# Patient Record
Sex: MALE | Race: BLACK OR AFRICAN AMERICAN | Employment: FULL TIME | URBAN - METROPOLITAN AREA
[De-identification: names, ages, dates, MRNs, and addresses within clinical notes are randomized per-mention and may not be internally consistent; named-entity substitution may affect disease eponyms.]

---

## 2018-08-13 ENCOUNTER — DOCUMENTATION ONLY (OUTPATIENT)
Dept: NEUROLOGY | Age: 48
End: 2018-08-13

## 2018-08-14 ENCOUNTER — OFFICE VISIT (OUTPATIENT)
Dept: NEUROLOGY | Age: 48
End: 2018-08-14

## 2018-08-14 VITALS
BODY MASS INDEX: 44.67 KG/M2 | RESPIRATION RATE: 18 BRPM | SYSTOLIC BLOOD PRESSURE: 144 MMHG | WEIGHT: 312 LBS | OXYGEN SATURATION: 98 % | HEIGHT: 70 IN | TEMPERATURE: 98.4 F | DIASTOLIC BLOOD PRESSURE: 80 MMHG | HEART RATE: 86 BPM

## 2018-08-14 DIAGNOSIS — G47.33 OSA (OBSTRUCTIVE SLEEP APNEA): Primary | ICD-10-CM

## 2018-08-14 PROBLEM — E66.01 OBESITY, MORBID (HCC): Status: ACTIVE | Noted: 2018-08-14

## 2018-08-14 RX ORDER — CARVEDILOL 25 MG/1
25 TABLET ORAL 2 TIMES DAILY WITH MEALS
COMMUNITY
End: 2019-09-30 | Stop reason: ALTCHOICE

## 2018-08-14 RX ORDER — ATORVASTATIN CALCIUM 40 MG/1
40 TABLET, FILM COATED ORAL DAILY
COMMUNITY
End: 2019-03-11 | Stop reason: SDUPTHER

## 2018-08-14 RX ORDER — BISMUTH SUBSALICYLATE 262 MG
1 TABLET,CHEWABLE ORAL DAILY
COMMUNITY

## 2018-08-14 RX ORDER — METFORMIN HYDROCHLORIDE 1000 MG/1
1000 TABLET ORAL 2 TIMES DAILY WITH MEALS
COMMUNITY
End: 2019-02-12 | Stop reason: SDUPTHER

## 2018-08-14 RX ORDER — LISINOPRIL AND HYDROCHLOROTHIAZIDE 20; 25 MG/1; MG/1
TABLET ORAL DAILY
COMMUNITY
End: 2019-01-28 | Stop reason: SDUPTHER

## 2018-08-14 NOTE — PROGRESS NOTES
Chief Complaint   Patient presents with    Sleep Problem     going on about 2 months     Patient in rm. 4

## 2018-08-14 NOTE — PROGRESS NOTES
Pt is followed at [facility] for h/o GRAHAM and tells me he is here by mistake. Therefore no services were provided and patient was not charged.

## 2018-08-14 NOTE — PATIENT INSTRUCTIONS
Thank you for choosing [facility] and [facility] for your     care. You may receive a survey about your visit. We appreciate you taking time     to complete this survey as we use your feedback to improve our services. We     realize we are not perfect, but we strive to provide excellent care.

## 2019-01-28 ENCOUNTER — HOSPITAL ENCOUNTER (OUTPATIENT)
Dept: LAB | Age: 49
Discharge: HOME | End: 2019-01-28
Payer: COMMERCIAL

## 2019-01-28 ENCOUNTER — OFFICE VISIT (OUTPATIENT)
Dept: FAMILY MEDICINE CLINIC | Age: 49
End: 2019-01-28

## 2019-01-28 VITALS
WEIGHT: 315 LBS | BODY MASS INDEX: 45.1 KG/M2 | HEART RATE: 88 BPM | DIASTOLIC BLOOD PRESSURE: 67 MMHG | SYSTOLIC BLOOD PRESSURE: 119 MMHG | RESPIRATION RATE: 16 BRPM | OXYGEN SATURATION: 98 % | HEIGHT: 70 IN | TEMPERATURE: 97.6 F

## 2019-01-28 DIAGNOSIS — F41.9 ANXIETY AND DEPRESSION: ICD-10-CM

## 2019-01-28 DIAGNOSIS — G62.9 POLYNEUROPATHY: ICD-10-CM

## 2019-01-28 DIAGNOSIS — E11.65 CONTROLLED TYPE 2 DIABETES MELLITUS WITH HYPERGLYCEMIA, WITH LONG-TERM CURRENT USE OF INSULIN (HCC): Primary | ICD-10-CM

## 2019-01-28 DIAGNOSIS — Z79.4 CONTROLLED TYPE 2 DIABETES MELLITUS WITH HYPERGLYCEMIA, WITH LONG-TERM CURRENT USE OF INSULIN (HCC): Primary | ICD-10-CM

## 2019-01-28 DIAGNOSIS — E11.65 CONTROLLED TYPE 2 DIABETES MELLITUS WITH HYPERGLYCEMIA, WITH LONG-TERM CURRENT USE OF INSULIN (HCC): ICD-10-CM

## 2019-01-28 DIAGNOSIS — Z79.4 CONTROLLED TYPE 2 DIABETES MELLITUS WITH HYPERGLYCEMIA, WITH LONG-TERM CURRENT USE OF INSULIN (HCC): ICD-10-CM

## 2019-01-28 DIAGNOSIS — I10 ESSENTIAL HYPERTENSION: ICD-10-CM

## 2019-01-28 DIAGNOSIS — F32.A ANXIETY AND DEPRESSION: ICD-10-CM

## 2019-01-28 DIAGNOSIS — B35.3 TINEA PEDIS OF BOTH FEET: ICD-10-CM

## 2019-01-28 DIAGNOSIS — E78.00 PURE HYPERCHOLESTEROLEMIA: ICD-10-CM

## 2019-01-28 LAB — HBA1C MFR BLD HPLC: 11.9 %

## 2019-01-28 PROCEDURE — 82043 UR ALBUMIN QUANTITATIVE: CPT

## 2019-01-28 RX ORDER — LISINOPRIL AND HYDROCHLOROTHIAZIDE 20; 25 MG/1; MG/1
1 TABLET ORAL DAILY
Qty: 90 TAB | Refills: 0 | Status: SHIPPED | OUTPATIENT
Start: 2019-01-28 | End: 2019-05-31 | Stop reason: SDUPTHER

## 2019-01-28 NOTE — PROGRESS NOTES
Pt is here to establish care. HTN, Diabetes, Cholesterol    1. Have you been to the ER, urgent care clinic since your last visit? Hospitalized since your last visit? Yes [facility] ED 11/15/18 Strep throat    2. Have you seen or consulted any other health care providers outside of the [address] [place] [address] since your last visit? Include any pap smears or colon screening.  No

## 2019-01-28 NOTE — PROGRESS NOTES
Subjective:    Ever Callaway is a 51y.o. year old male seen for follow up of diabetes. He also has hypertension and hyperlipidemia. Diabetic Review of Systems - medication compliance: compliant all of the time, diabetic diet compliance: noncompliant some of the time, home glucose monitoring: is performed sporadically, fasting values range 200, further diabetic ROS: no polyuria or polydipsia, no chest pain, dyspnea or TIA's, no numbness, tingling or pain in extremities, last eye exam approximately 1year ago, acute symptoms are none. Other symptoms and concerns: patient reports he has symptoms of anxiety/depression for years. Has not been treated in the past.  Patient would like to explore counseling, does not want medication therapy at this time. PHQ-9 depression questionnaire score 14 reflecting moderate depression; previously n/a  SEGUNDO-7 anxiety questionnaire score 13 reflecting moderate anxiety; previously n/a        Patient Active Problem List   Diagnosis Code    Obesity, morbid ([facility]) E66.01     Current Outpatient Medications   Medication Sig Dispense Refill    insulin glargine,hum.rec.anlog (LANTUS U-100 INSULIN SC) 100 Units by SubCUTAneous route every evening.  metFORMIN (GLUCOPHAGE) 1,000 mg tablet Take 1,000 mg by mouth two (2) times daily (with meals).  atorvastatin (LIPITOR) 40 mg tablet Take 40 mg by mouth daily.  carvedilol (COREG) 25 mg tablet Take 25 mg by mouth two (2) times daily (with meals).  lisinopril-hydroCHLOROthiazide (PRINZIDE, ZESTORETIC) 20-25 mg per tablet Take  by mouth daily.  multivitamin (ONE A DAY) tablet Take 1 Tab by mouth daily.         Allergies   Allergen Reactions    Pcn [Penicillins] Other (comments)     Unknown was told when a child     Past Surgical History:   Procedure Laterality Date    HX APPENDECTOMY       Family History   Problem Relation Age of Onset    Cancer Mother     Heart Disease Father       Lab Results   Component Value Date/Time    Cholesterol, total 178 05/18/2012 11:09 AM    HDL Cholesterol 35 (L) 05/18/2012 11:09 AM    LDL, calculated 118.6 (H) 05/18/2012 11:09 AM    VLDL, calculated 24.4 05/18/2012 11:09 AM    Triglyceride 122 05/18/2012 11:09 AM    CHOL/HDL Ratio 5.1 (H) 05/18/2012 11:09 AM     Lab Results   Component Value Date/Time    Sodium 138 12/18/2012 01:50 PM    Potassium 4.3 12/18/2012 01:50 PM    Chloride 100 12/18/2012 01:50 PM    CO2 31 12/18/2012 01:50 PM    Anion gap 7 12/18/2012 01:50 PM    Glucose 93 12/18/2012 01:50 PM    BUN 23 (H) 12/18/2012 01:50 PM    Creatinine 1.32 (H) 12/18/2012 01:50 PM    BUN/Creatinine ratio 17 12/18/2012 01:50 PM    GFR est AA >60 12/18/2012 01:50 PM    GFR est non-AA >60 12/18/2012 01:50 PM    Calcium 9.6 12/18/2012 01:50 PM    Bilirubin, total 0.4 12/18/2012 01:50 PM    AST (SGOT) 29 12/18/2012 01:50 PM    Alk.  phosphatase 51 12/18/2012 01:50 PM    Protein, total 7.9 12/18/2012 01:50 PM    Albumin 4.2 12/18/2012 01:50 PM    Globulin 3.7 12/18/2012 01:50 PM    A-G Ratio 1.1 12/18/2012 01:50 PM    ALT (SGPT) 45 12/18/2012 01:50 PM     Wt Readings from Last 3 Encounters:   01/28/19 319 lb (144.7 kg)   08/14/18 312 lb (141.5 kg)     BP Readings from Last 3 Encounters:   01/28/19 119/67   08/14/18 144/80       Last Point of Care Urine mircoalbumin  Results for orders placed or performed during the hospital encounter of 12/11/13   EKG, 12 LEAD, INITIAL   Result Value Ref Range    Ventricular Rate 65 BPM    Atrial Rate 65 BPM    P-R Interval 144 ms    QRS Duration 76 ms    Q-T Interval 416 ms    QTC Calculation (Bezet) 432 ms    Calculated P Axis 16 degrees    Calculated R Axis 54 degrees    Calculated T Axis 15 degrees    Diagnosis       Normal sinus rhythm  Normal ECG  No previous ECGs available  Confirmed by [id number] ([id number]) on 12/11/2013 8:14:30 PM     Lab Results   Component Value Date/Time    Microalbumin/Creat ratio (mg/g creat) 201 (H) 02/03/2010 02:21 PM Microalbumin,urine random 64.50 (H) 02/03/2010 02:21 PM       Last Diabetic Foot Exam on: n/a        Objective:  Visit Vitals  /67 (BP 1 Location: Left arm)   Pulse 88   Temp 97.6 °F (36.4 °C) (Oral)   Resp 16   Ht 5' 10\" (1.778 m)   Wt 319 lb (144.7 kg)   SpO2 98%   BMI 45.77 kg/m²     Awake and alert in no acute distress   Neck supple without lymphadenopathy, no carotid artery bruits auscultated bilaterally. No thyromegaly   Lungs clear throughout   S1 S2 RRR without ectopy or murmur auscultated. Extremities: no clubbing, cyanosis, peripheral edema   Abdomen - soft, nontender, nondistended, no masses or organomegaly  Integumentary: no rashes   Reviewed vital signs     Results for orders placed or performed in visit on 01/28/19   AMB POC HEMOGLOBIN A1C   Result Value Ref Range    Hemoglobin A1c (POC) 11.9 %     Diabetic foot exam:     Left Foot:   Visual Exam: normal white macerated skin to 3 and 4th toe webs   Pulse DP: 2+ (normal)   Filament test: reduced sensation 3/6   Vibratory sensation: diminished       Right Foot:   Visual Exam: normal white macerated skin to 3rd/4th toe web spaces   Pulse DP: 2+ (normal)   Filament test: reduced sensation 4/6   Vibratory sensation: diminished        Assessment/Plan:    ICD-10-CM ICD-9-CM    1. Controlled type 2 diabetes mellitus with hyperglycemia, with long-term current use of insulin ([facility]) E11.65 250.80 AMB POC HEMOGLOBIN A1C    Z79.4 790.29 MICROALBUMIN, UR, RAND W/ MICROALB/CREAT RATIO     X68.04 METABOLIC PANEL, COMPREHENSIVE   2. Essential hypertension I10 401.9 lisinopril-hydroCHLOROthiazide (PRINZIDE, ZESTORETIC) 20-25 mg per tablet      METABOLIC PANEL, COMPREHENSIVE   3. Pure hypercholesterolemia E78.00 272.0 LIPID PANEL      METABOLIC PANEL, COMPREHENSIVE   4. BMI 45.0-49.9, adult ([facility]) Z68.42 V85.42    5. Polyneuropathy G62.9 356.9    6. Anxiety and depression F41.9 300.00     F32.9 311    7.  Tinea pedis of both feet B35.3 110.4      needs to follow diet more regularly  Issues reviewed with him: low cholesterol diet, weight control and daily exercise discussed and all medications, side effects and compliance discussed carefully. I have discussed the diagnosis with the patient and the intended plan as seen in the above orders. The patient has received an after-visit summary and questions were answered concerning future plans. I have discussed medication side effects and warnings with the patient as well. Patient agreeable with above plan and verbalizes understanding. Follow-up Disposition:  Return in about 4 weeks (around 2/25/2019) for DM/HTN.

## 2019-01-28 NOTE — PROGRESS NOTES
Pharmacy Note - Diabetes   01/28/19       Patient name: Suni Rae (29 y.o., male)  YOB: 1970    Referred by: Tim Pro NP for diabetes education / management   Past Medical History:   Diagnosis Date    Diabetes ([facility] [address]     Hypercholesterolemia     Hypertension      Allergies: Allergies   Allergen Reactions    Pcn [Penicillins] Other (comments)     Unknown was told when a child     Subjective / Objective      Medications:   Current medications for diabetes include:      Key Antihyperglycemic Medications             insulin glargine,hum.rec.anlog (LANTUS U-100 INSULIN SC)  (Taking) 100 Units by SubCUTAneous route every evening. metFORMIN (GLUCOPHAGE) 1,000 mg tablet  (Taking) Take 1,000 mg by mouth two (2) times daily (with meals). Previous medications used for diabetes management include:   [] metformin [] SGLT-2 Inhibitors   [] sulfonylureas [] TZDs   [] DPP-IV inhibitors [] insulin   [x] GLP-1 agonists (Bydureon) [] none     Patient denies adherence with his medications. Takes the insulin daily but has only been taking the metformin 1000 mg once daily     Blood Glucose Findings:   He checks his blood glucose readings sporadically. Patient did not bring his blood sugar log to today's visit. - fasting range: usually in the low 200's when he checks     Hypoglycemic episodes: No    His last A1c value(s) noted to be:      Lab Results   Component Value Date/Time    Hemoglobin A1c 7.3 (H) 06/16/2010 03:00 PM    Hemoglobin A1c 11.3 (H) 02/03/2010 02:21 PM    Hemoglobin A1c (POC) 11.9 01/28/2019 08:16 AM     Dietary Considerations: Works overnights so wife usually cooks his first meal of the day. Second meal of the day is usually just before going into work.  Doesn't seem like he eats a third meal as he says he will usually come home and go to bed so he doesn't eat, but will sometimes have cereal.     Physical Activity: Noted that he was going to try going back to the gym, likes to lift weights and bike. Screenings/Prevention Parameters:  -Diabetic Eye and Foot Exams:      Diabetic Foot and Eye Exam HM Status   Topic Date Due    Eye Exam  01/13/1981    Diabetic Foot Care  01/28/2020     -Microalbumin / Creatinine ratio:       Lab Results   Component Value Date/Time    Microalbumin/Creat ratio (mg/g creat) 201 (H) 02/03/2010 02:21 PM    Microalbumin,urine random 64.50 (H) 02/03/2010 02:21 PM     -Immunizations: There is no immunization history on file for this patient. Additional Laboratory Parameters of Interest:     Estimation of renal function:  Lab Results   Component Value Date/Time    Creatinine 1.32 (H) 12/18/2012 01:50 PM    Creatinine 1.5 (H) 05/18/2012 11:09 AM    Creatinine 1.2 06/16/2010 03:00 PM    GFR est AA >60 12/18/2012 01:50 PM    GFR est AA >60 05/18/2012 11:09 AM    GFR est AA >60 06/16/2010 03:00 PM    GFR est non-AA >60 12/18/2012 01:50 PM    GFR est non-AA 55 (L) 05/18/2012 11:09 AM    GFR est non-AA >60 06/16/2010 03:00 PM     Wt Readings from Last 3 Encounters:   01/28/19 319 lb (144.7 kg)   08/14/18 312 lb (141.5 kg)     Ht Readings from Last 1 Encounters:   01/28/19 5' 10\" (1.778 m)     Vital Signs Today:      Visit Vitals  /67 (BP 1 Location: Left arm)   Pulse 88   Temp 97.6 °F (36.4 °C) (Oral)   Resp 16   Ht 5' 10\" (1.778 m)   Wt 319 lb (144.7 kg)   SpO2 98%   BMI 45.77 kg/m²       Assessment / Plan        1. Diabetes:  Goal A1C: < 7%. Patient's most recent A1c is not at their current goal. During the visit today reviewed with patient: self-monitoring blood glucose fasting/post-prandial goals, importance of blood glucose control in avoidance of diabetic complications or further progression if already present, impact of exercise on glucose control, and diet and health maintenance items explained below.  Patient noted that previous PCP had put him on the Bydureon pen (not the auto injector) and disliked the needle size related to that product. States that he took it about twice. With the second administration patient noted feeling funny, no specifics related to nausea/vomitting just general 'off' feeling. Discussed trial of other agents in the class (Trulicity or Ozempic). Reviewed side effects and benefits of medications. Also reviewed Jardiance with patient. He decided at this time Jardiance along with dietary measures and exercise would be a more reasonable choice for him at this time. Patient also noted that for the past month or so, he has only been using the metformin 1000 mg once daily. No major tolerability issues noted (some loose stools occasionally), so encouraged patient to resume twice daily dosing. Prescription sent over for [facility] as well. Patient was advised this may not be covered depending on insurance coverage. Advised him if this was the case, would suggest tracking fasting readings several times per week to determine general level of control. 2. Diet/lifestyle:  Reviewed with patient utilization of the plate method as a way to encourage healthy eating. Reviewed carbohydrate and non-carbohydrate rich foods, carbohydrate content of various foods, appropriate serving sizes for carbohydrates (15 grams = 1 carb serving), reading the nutrition label focusing on total carbohydrates while being mindful of serving size, recommended serving sizes for carbohydrates for meals and snacks (45-60g with meals and less than or equal to 15g for snacks ), and discussion regarding fruits as a carbohydrate. Patient education materials were provided and reviewed with the patient for their future reference and use. Reviewed information with patient above and encoruaged him to begin tracking portion sizes and trying to incorporate more non starchy vegetables into his diet. Patient verbalized understanding of the information presented and all of the patients questions were answered.   AVS was handed to the patient and information reviewed. Patient advised to call the office with any additional questions or concerns. Notification of recommendations will be sent to Shauna Martin NP for review.     Future Appointments   Date Time Provider Balta Zully   3/11/2019  1:40 PM Shauna Martin NP [address] [place] [address]   3/11/2019  2:00 PM [address]         Thank you for the consult,  Tracy Kidd, PharmD, BCPS, BCACP, BC-ADM

## 2019-01-29 LAB
CREAT UR-MCNC: 103 MG/DL (ref 30–125)
MICROALBUMIN UR-MCNC: 14.3 MG/DL (ref 0–3)
MICROALBUMIN/CREAT UR-RTO: 139 MG/G (ref 0–30)

## 2019-02-13 RX ORDER — METFORMIN HYDROCHLORIDE 1000 MG/1
1000 TABLET ORAL 2 TIMES DAILY WITH MEALS
Qty: 60 TAB | Refills: 3 | Status: SHIPPED | OUTPATIENT
Start: 2019-02-13 | End: 2019-08-07 | Stop reason: SDUPTHER

## 2019-02-20 RX ORDER — INSULIN GLARGINE 100 [IU]/ML
100 INJECTION, SOLUTION SUBCUTANEOUS EVERY EVENING
COMMUNITY
End: 2019-02-20 | Stop reason: SDUPTHER

## 2019-02-20 NOTE — TELEPHONE ENCOUNTER
Pt's wife request refill of:     insulin glargine,hum.rec.anlog (LANTUS U-100 INSULIN SC)    I couldn't grab it from his medication list    Send to [address] [place] [address]

## 2019-02-25 RX ORDER — INSULIN GLARGINE 100 [IU]/ML
100 INJECTION, SOLUTION SUBCUTANEOUS EVERY EVENING
Qty: 3 VIAL | Refills: 2 | Status: SHIPPED | OUTPATIENT
Start: 2019-02-25 | End: 2019-06-03 | Stop reason: SDUPTHER

## 2019-03-11 ENCOUNTER — OFFICE VISIT (OUTPATIENT)
Dept: FAMILY MEDICINE CLINIC | Age: 49
End: 2019-03-11

## 2019-03-11 VITALS
TEMPERATURE: 98.7 F | RESPIRATION RATE: 16 BRPM | DIASTOLIC BLOOD PRESSURE: 77 MMHG | HEART RATE: 98 BPM | HEIGHT: 70 IN | WEIGHT: 305.2 LBS | SYSTOLIC BLOOD PRESSURE: 136 MMHG | BODY MASS INDEX: 43.69 KG/M2 | OXYGEN SATURATION: 98 %

## 2019-03-11 DIAGNOSIS — G62.9 POLYNEUROPATHY: ICD-10-CM

## 2019-03-11 DIAGNOSIS — E78.00 PURE HYPERCHOLESTEROLEMIA: ICD-10-CM

## 2019-03-11 DIAGNOSIS — I10 ESSENTIAL HYPERTENSION: ICD-10-CM

## 2019-03-11 DIAGNOSIS — E11.65 UNCONTROLLED TYPE 2 DIABETES MELLITUS WITH HYPERGLYCEMIA (HCC): Primary | ICD-10-CM

## 2019-03-11 RX ORDER — ATORVASTATIN CALCIUM 40 MG/1
40 TABLET, FILM COATED ORAL DAILY
Qty: 30 TAB | Refills: 2 | Status: SHIPPED | OUTPATIENT
Start: 2019-03-11 | End: 2019-05-31 | Stop reason: DRUGHIGH

## 2019-03-11 NOTE — PATIENT INSTRUCTIONS

## 2019-03-11 NOTE — PROGRESS NOTES
Subjective:    Sihvam Oconnor is a 51y.o. year old male seen for follow up of diabetes. He also has hypertension, hyperlipidemia and obesity. Diabetic Review of Systems - medication compliance: compliant all of the time, diabetic diet compliance: compliant most of the time, home glucose monitoring: is not performed, further diabetic ROS: no polyuria or polydipsia, no chest pain, dyspnea or TIA's, no numbness, tingling or pain in extremities, last eye exam approximately 1 year ago, acute symptoms are none. Reports he has been decreasing his intake of carbs, just renewed his gym membership and will begin exercising. Has decreased his portion sizes. Other symptoms and concerns: reports his insurance does not cover lantus. Surekha D in to speak with patient. Patient states he will need to have letter and FMLA forms stating he has diabetes. Reports every once awhile he will feel fatigued and will need to go home. Denies dizziness/headaches. patti is not checking glucose, will be getting his Freestyle Celaya today. Patient Active Problem List   Diagnosis Code    [facility] E66.01     Current Outpatient Medications   Medication Sig Dispense Refill    metFORMIN (GLUCOPHAGE) 1,000 mg tablet Take 1 Tab by mouth two (2) times daily (with meals). 60 Tab 3    lisinopril-hydroCHLOROthiazide (PRINZIDE, ZESTORETIC) 20-25 mg per tablet Take 1 Tab by mouth daily. 90 Tab 0    empagliflozin (JARDIANCE) 10 mg tablet Take 1 Tab by mouth daily. 30 Tab 2    atorvastatin (LIPITOR) 40 mg tablet Take 40 mg by mouth daily.  carvedilol (COREG) 25 mg tablet Take 25 mg by mouth two (2) times daily (with meals).  insulin glargine (LANTUS U-100 INSULIN) 100 unit/mL injection 100 Units by SubCUTAneous route every evening.  3 Vial 2    flash glucose scanning reader (FREESTYLE GERALD 14 DAY READER) [facility] Use to monitor blood sugar at least 3 times daily 1 Each 0    flash glucose sensor (FREESTYLE GERALD 14 DAY SENSOR) kit Apply new sensor every 14 days to monitor blood sugar as directed. Please dispense 2 sensors. 1 Kit 11    insulin glargine,hum.rec.anlog (LANTUS U-100 INSULIN SC) 100 Units by SubCUTAneous route every evening.  multivitamin (ONE A DAY) tablet Take 1 Tab by mouth daily. Allergies   Allergen Reactions    Pcn [Penicillins] Other (comments)     Unknown was told when a child     Past Surgical History:   Procedure Laterality Date    HX APPENDECTOMY       Family History   Problem Relation Age of Onset    Cancer Mother         unknown ? breast    Heart Disease Father     No Known Problems Sister     Cancer Brother     No Known Problems Sister     No Known Problems Brother     No Known Problems Brother     Diabetes Daughter     Diabetes Daughter       Lab Results   Component Value Date/Time    Cholesterol, total 178 05/18/2012 11:09 AM    HDL Cholesterol 35 (L) 05/18/2012 11:09 AM    LDL, calculated 118.6 (H) 05/18/2012 11:09 AM    VLDL, calculated 24.4 05/18/2012 11:09 AM    Triglyceride 122 05/18/2012 11:09 AM    CHOL/HDL Ratio 5.1 (H) 05/18/2012 11:09 AM     Lab Results   Component Value Date/Time    Sodium 138 12/18/2012 01:50 PM    Potassium 4.3 12/18/2012 01:50 PM    Chloride 100 12/18/2012 01:50 PM    CO2 31 12/18/2012 01:50 PM    Anion gap 7 12/18/2012 01:50 PM    Glucose 93 12/18/2012 01:50 PM    BUN 23 (H) 12/18/2012 01:50 PM    Creatinine 1.32 (H) 12/18/2012 01:50 PM    BUN/Creatinine ratio 17 12/18/2012 01:50 PM    GFR est AA >60 12/18/2012 01:50 PM    GFR est non-AA >60 12/18/2012 01:50 PM    Calcium 9.6 12/18/2012 01:50 PM    Bilirubin, total 0.4 12/18/2012 01:50 PM    AST (SGOT) 29 12/18/2012 01:50 PM    Alk.  phosphatase 51 12/18/2012 01:50 PM    Protein, total 7.9 12/18/2012 01:50 PM    Albumin 4.2 12/18/2012 01:50 PM    Globulin 3.7 12/18/2012 01:50 PM    A-G Ratio 1.1 12/18/2012 01:50 PM    ALT (SGPT) 45 12/18/2012 01:50 PM     No results found for: WBC, WBCLT, HGBPOC, HGB, HGBP, HCTPOC, HCT, PHCT, RBCH, PLT, MCV, HGBEXT, HCTEXT, PLTEXT  Wt Readings from Last 3 Encounters:   03/11/19 305 lb 3.2 oz (138.4 kg)   01/28/19 319 lb (144.7 kg)   08/14/18 312 lb (141.5 kg)     Last Point of Care HGB A1C  Hemoglobin A1c (POC)   Date Value Ref Range Status   01/28/2019 11.9 % Final      BP Readings from Last 3 Encounters:   03/11/19 136/77   01/28/19 119/67   08/14/18 144/80       Last Point of Care Urine mircoalbumin  Results for orders placed or performed during the hospital encounter of 01/28/19   MICROALBUMIN, UR, RAND W/ MICROALB/CREAT RATIO   Result Value Ref Range    Microalbumin,urine random 14.30 (H) 0 - 3.0 MG/DL    Creatinine, urine 103.00 30 - 125 mg/dL    Microalbumin/Creat ratio (mg/g creat) 139 (H) 0 - 30 mg/g     Lab Results   Component Value Date/Time    Microalbumin/Creat ratio (mg/g creat) 139 (H) 01/28/2019 09:00 AM    Microalbumin,urine random 14.30 (H) 01/28/2019 09:00 AM       Last Diabetic Foot Exam on: 1/28/19      Objective:  Visit Vitals  /77 (BP 1 Location: Left arm)   Pulse 98   Temp 98.7 °F (37.1 °C) (Oral)   Resp 16   Ht 5' 10\" (1.778 m)   Wt 305 lb 3.2 oz (138.4 kg)   SpO2 98%   BMI 43.79 kg/m²     Awake and alert in no acute distress   Neck supple without lymphadenopathy, no carotid artery bruits auscultated bilaterally. No thyromegaly   Lungs clear throughout   S1 S2 RRR without ectopy or murmur auscultated. Extremities: no clubbing, cyanosis, peripheral edema   Integumentary: no rashes   Reviewed vital signs           Assessment/Plan:    ICD-10-CM ICD-9-CM    1. Uncontrolled type 2 diabetes mellitus with hyperglycemia (HCC) E11.65 250.02    2. Essential hypertension I10 401.9    3. Pure hypercholesterolemia E78.00 272.0    4. BMI 45.0-49.9, adult ([facility]) Z68.42 V85.42    5.  Polyneuropathy G62.9 356.9      Pharm D in to speak with patient  reasonably well controlled  Issues reviewed with him: home glucose monitoring emphasized, foot care discussed and Podiatry visits discussed, annual eye examinations at Ophthalmology discussed and long term diabetic complications discussed. I have discussed the diagnosis with the patient and the intended plan as seen in the above orders. The patient has received an after-visit summary and questions were answered concerning future plans. I have discussed medication side effects and warnings with the patient as well. Patient agreeable with above plan and verbalizes understanding. Follow-up Disposition:  Return in about 2 months (around 5/11/2019) for DM/HTN.

## 2019-03-11 NOTE — PROGRESS NOTES
Pt is here for F/U on diabetes. Pt states he has not taken insulin since Wednesday due to problems getting lantus. 1. Have you been to the ER, urgent care clinic since your last visit? Hospitalized since your last visit? No    2. Have you seen or consulted any other health care providers outside of the [facility] since your last visit? Include any pap smears or colon screening.  No

## 2019-03-12 NOTE — PROGRESS NOTES
Pharmacy Note - Diabetes   03/11/19       Patient name: Tommie Ganser (89 y.o., male)  YOB: 1970    Referred by: Yoli Ma NP for diabetes education / management   Past Medical History:   Diagnosis Date    Diabetes ([facility] [address]     Hypercholesterolemia     Hypertension      Allergies: Allergies   Allergen Reactions    Pcn [Penicillins] Other (comments)     Unknown was told when a child     Subjective / Objective      Medications:   Current medications for diabetes include:      Key Antihyperglycemic Medications             insulin glargine (LANTUS U-100 INSULIN) 100 unit/mL injection 100 Units by SubCUTAneous route every evening. metFORMIN (GLUCOPHAGE) 1,000 mg tablet Take 1 Tab by mouth two (2) times daily (with meals). empagliflozin (JARDIANCE) 10 mg tablet Take 1 Tab by mouth daily. insulin glargine,hum.rec.anlog (LANTUS U-100 INSULIN SC) 100 Units by SubCUTAneous route every evening. Patient reports running out of Lantus on Friday. Reports that pharmacy would not fill. Blood Glucose Findings:   He checks his blood glucose readings 0 times daily. Did not  [place]. Was concerned about cost as meter was around $80 and sensors were $60 for a month supply. Hypoglycemic episodes: No    His last A1c value(s) noted to be:      Lab Results   Component Value Date/Time    Hemoglobin A1c 7.3 (H) 06/16/2010 03:00 PM    Hemoglobin A1c 11.3 (H) 02/03/2010 02:21 PM    Hemoglobin A1c (POC) 11.9 01/28/2019 08:16 AM     Assessment / Plan      1. Diabetes: Patient noted that he had run out of Lantus and hadn't taken any since Friday. Based on formulary review Basaglar appears to be only covered long acting insulin. Difficult with pen noted to be it only dials up to 80 units at one time. Dicussed this with patient and stated that he would likely not take more than 80 units if we went this route. He was concerned with the cost of the [place].  Did discuss with patient that he would need to monitor his blood sugar to help guide therapy. Advised patient that if he wore the [facility] for 1 cycle per month this would still be valuable as we would be able to see averages over the sensor period. Patient agreeable to this approach. He will get [facility] system and make an appointment after he has been wearing sensor for about 8 days. Noted that he is tolerating Jardiance well. Does notice extra urination and dry skin around in fingers and toes but otherwise tolerable. No adjustments made to medications today. Patient also noted that he has a gym membership now, has not gone yet. Called [facility] for prior authorization for Lantus. Lantus was approved with case number [id number]. Patient informed of approval before leaving clinic. He was also given copay card for Lantus to help reduce cost of medication. Patient noted that [place] is affordable fo him at this time. Patient verbalized understanding of the information presented and all of the patients questions were answered. AVS was handed to the patient and information reviewed. Patient advised to call the office with any additional questions or concerns. Notification of recommendations will be sent to Mohini Brownlee NP for review.     Future Appointments   Date Time Provider Balta Andrea   5/10/2019  1:00 PM Mohini Brownlee NP [facility]       Thank you for the consult,  Azalia Brantley, PharmD, BCPS, BCACP, BC-ADM

## 2019-03-26 ENCOUNTER — TELEPHONE (OUTPATIENT)
Dept: FAMILY MEDICINE CLINIC | Age: 49
End: 2019-03-26

## 2019-03-26 NOTE — TELEPHONE ENCOUNTER
Placed call to patient to remind him of fasting labs that were ordered by [facility] [place]. Patient needs to complete labs prior to next appointment if possible.

## 2019-05-07 ENCOUNTER — HOSPITAL ENCOUNTER (OUTPATIENT)
Dept: LAB | Age: 49
Discharge: HOME | End: 2019-05-07
Payer: COMMERCIAL

## 2019-05-07 DIAGNOSIS — Z79.4 CONTROLLED TYPE 2 DIABETES MELLITUS WITH HYPERGLYCEMIA, WITH LONG-TERM CURRENT USE OF INSULIN (HCC): ICD-10-CM

## 2019-05-07 DIAGNOSIS — I10 ESSENTIAL HYPERTENSION: ICD-10-CM

## 2019-05-07 DIAGNOSIS — E11.65 CONTROLLED TYPE 2 DIABETES MELLITUS WITH HYPERGLYCEMIA, WITH LONG-TERM CURRENT USE OF INSULIN (HCC): ICD-10-CM

## 2019-05-07 DIAGNOSIS — E78.00 PURE HYPERCHOLESTEROLEMIA: ICD-10-CM

## 2019-05-07 LAB
ALBUMIN SERPL-MCNC: 4.3 G/DL (ref 3.4–5)
ALBUMIN/GLOB SERPL: 1.2 {RATIO} (ref 0.8–1.7)
ALP SERPL-CCNC: 71 U/L (ref 45–117)
ALT SERPL-CCNC: 35 U/L (ref 16–61)
ANION GAP SERPL CALC-SCNC: 11 MMOL/L (ref 3–18)
AST SERPL-CCNC: 21 U/L (ref 15–37)
BILIRUB SERPL-MCNC: 0.4 MG/DL (ref 0.2–1)
BUN SERPL-MCNC: 43 MG/DL (ref 7–18)
BUN/CREAT SERPL: 24 (ref 12–20)
CALCIUM SERPL-MCNC: 9.1 MG/DL (ref 8.5–10.1)
CHLORIDE SERPL-SCNC: 99 MMOL/L (ref 100–108)
CHOLEST SERPL-MCNC: 211 MG/DL
CO2 SERPL-SCNC: 26 MMOL/L (ref 21–32)
CREAT SERPL-MCNC: 1.79 MG/DL (ref 0.6–1.3)
GLOBULIN SER CALC-MCNC: 3.6 G/DL (ref 2–4)
GLUCOSE SERPL-MCNC: 137 MG/DL (ref 74–99)
HDLC SERPL-MCNC: 35 MG/DL (ref 40–60)
HDLC SERPL: 6 {RATIO} (ref 0–5)
LDLC SERPL CALC-MCNC: 114.4 MG/DL (ref 0–100)
LIPID PROFILE,FLP: ABNORMAL
POTASSIUM SERPL-SCNC: 3.8 MMOL/L (ref 3.5–5.5)
PROT SERPL-MCNC: 7.9 G/DL (ref 6.4–8.2)
SODIUM SERPL-SCNC: 136 MMOL/L (ref 136–145)
TRIGL SERPL-MCNC: 308 MG/DL (ref ?–150)
VLDLC SERPL CALC-MCNC: 61.6 MG/DL

## 2019-05-07 PROCEDURE — 80061 LIPID PANEL: CPT

## 2019-05-07 PROCEDURE — 80053 COMPREHEN METABOLIC PANEL: CPT

## 2019-05-07 PROCEDURE — 36415 COLL VENOUS BLD VENIPUNCTURE: CPT

## 2019-05-22 ENCOUNTER — TELEPHONE (OUTPATIENT)
Dept: FAMILY MEDICINE CLINIC | Age: 49
End: 2019-05-22

## 2019-05-22 NOTE — TELEPHONE ENCOUNTER
Spoke to patient and schedule an appointment for diabetes follow up and to  discuss lab results with Halle Blevins.   Patient is scheduled for 5/28/19

## 2019-05-31 ENCOUNTER — HOSPITAL ENCOUNTER (OUTPATIENT)
Dept: LAB | Age: 49
Discharge: HOME | End: 2019-05-31
Payer: COMMERCIAL

## 2019-05-31 ENCOUNTER — OFFICE VISIT (OUTPATIENT)
Dept: FAMILY MEDICINE CLINIC | Age: 49
End: 2019-05-31

## 2019-05-31 VITALS
BODY MASS INDEX: 44.24 KG/M2 | SYSTOLIC BLOOD PRESSURE: 134 MMHG | TEMPERATURE: 98.6 F | WEIGHT: 309 LBS | DIASTOLIC BLOOD PRESSURE: 78 MMHG | RESPIRATION RATE: 18 BRPM | OXYGEN SATURATION: 99 % | HEIGHT: 70 IN | HEART RATE: 81 BPM

## 2019-05-31 DIAGNOSIS — I10 ESSENTIAL HYPERTENSION: ICD-10-CM

## 2019-05-31 DIAGNOSIS — R35.89 POLYURIA: ICD-10-CM

## 2019-05-31 DIAGNOSIS — E78.00 PURE HYPERCHOLESTEROLEMIA: ICD-10-CM

## 2019-05-31 DIAGNOSIS — E11.65 UNCONTROLLED TYPE 2 DIABETES MELLITUS WITH HYPERGLYCEMIA (HCC): Primary | ICD-10-CM

## 2019-05-31 DIAGNOSIS — E11.65 UNCONTROLLED TYPE 2 DIABETES MELLITUS WITH HYPERGLYCEMIA (HCC): ICD-10-CM

## 2019-05-31 DIAGNOSIS — E66.01 OBESITY, CLASS III, BMI 40-49.9 (MORBID OBESITY) (HCC): ICD-10-CM

## 2019-05-31 LAB
BILIRUB UR QL STRIP: NEGATIVE
GLUCOSE UR-MCNC: NORMAL MG/DL
HBA1C MFR BLD HPLC: 10.5 %
KETONES P FAST UR STRIP-MCNC: NEGATIVE MG/DL
PH UR STRIP: 5.5 [PH] (ref 4.6–8)
PROT UR QL STRIP: NORMAL
SP GR UR STRIP: 1.01 (ref 1–1.03)
UA UROBILINOGEN AMB POC: NORMAL (ref 0.2–1)
URINALYSIS CLARITY POC: CLEAR
URINALYSIS COLOR POC: YELLOW
URINE BLOOD POC: NORMAL
URINE LEUKOCYTES POC: NEGATIVE
URINE NITRITES POC: NEGATIVE

## 2019-05-31 PROCEDURE — 82043 UR ALBUMIN QUANTITATIVE: CPT

## 2019-05-31 RX ORDER — LISINOPRIL AND HYDROCHLOROTHIAZIDE 20; 25 MG/1; MG/1
1 TABLET ORAL DAILY
Qty: 90 TAB | Refills: 0 | Status: SHIPPED | OUTPATIENT
Start: 2019-05-31 | End: 2019-08-13 | Stop reason: SDUPTHER

## 2019-05-31 RX ORDER — ATORVASTATIN CALCIUM 80 MG/1
80 TABLET, FILM COATED ORAL DAILY
Qty: 90 TAB | Refills: 1 | Status: SHIPPED | OUTPATIENT
Start: 2019-05-31 | End: 2019-10-29 | Stop reason: SDUPTHER

## 2019-05-31 NOTE — PROGRESS NOTES
Subjective:    Tg Polo is a 51y.o. year old male seen for follow up of diabetes. He also has hypertension, hyperlipidemia and obesity. Diabetic Review of Systems - medication compliance: compliant all of the time, diabetic diet compliance: noncompliant some of the time, home glucose monitoring: is performed regularly, average glucose readings for the last 30 days 189, further diabetic ROS: no polyuria or polydipsia, no chest pain, dyspnea or TIA's, no numbness, tingling or pain in extremities. Other symptoms and concerns: reports he has noticed since he has been on jardiance he has been urinating more. Patient Active Problem List   Diagnosis Code    [facility] E66.01     Current Outpatient Medications   Medication Sig Dispense Refill    empagliflozin (JARDIANCE) 10 mg tablet Take 1 Tab by mouth daily. 90 Tab 2    atorvastatin (LIPITOR) 40 mg tablet Take 1 Tab by mouth daily. 30 Tab 2    insulin glargine (LANTUS U-100 INSULIN) 100 unit/mL injection 100 Units by SubCUTAneous route every evening. 3 Vial 2    metFORMIN (GLUCOPHAGE) 1,000 mg tablet Take 1 Tab by mouth two (2) times daily (with meals). 60 Tab 3    flash glucose scanning reader (FREESTYLE GERALD 14 DAY READER) [facility] Use to monitor blood sugar at least 3 times daily 1 Each 0    flash glucose sensor (FREESTYLE GERALD 14 DAY SENSOR) kit Apply new sensor every 14 days to monitor blood sugar as directed. Please dispense 2 sensors. 1 Kit 11    lisinopril-hydroCHLOROthiazide (PRINZIDE, ZESTORETIC) 20-25 mg per tablet Take 1 Tab by mouth daily. 90 Tab 0    insulin glargine,hum.rec.anlog (LANTUS U-100 INSULIN SC) 100 Units by SubCUTAneous route every evening.  carvedilol (COREG) 25 mg tablet Take 25 mg by mouth two (2) times daily (with meals).  multivitamin (ONE A DAY) tablet Take 1 Tab by mouth daily.         Allergies   Allergen Reactions    Pcn [Penicillins] Other (comments)     Unknown was told when a child     Past Surgical History:   Procedure Laterality Date    HX APPENDECTOMY       Family History   Problem Relation Age of Onset    Cancer Mother         unknown ? breast    Heart Disease Father     No Known Problems Sister     Cancer Brother     No Known Problems Sister     No Known Problems Brother     No Known Problems Brother     Diabetes Daughter     Diabetes Daughter       Lab Results   Component Value Date/Time    Cholesterol, total 211 (H) 05/07/2019 09:14 AM    HDL Cholesterol 35 (L) 05/07/2019 09:14 AM    LDL, calculated 114.4 (H) 05/07/2019 09:14 AM    VLDL, calculated 61.6 05/07/2019 09:14 AM    Triglyceride 308 (H) 05/07/2019 09:14 AM    CHOL/HDL Ratio 6.0 (H) 05/07/2019 09:14 AM     Lab Results   Component Value Date/Time    Sodium 136 05/07/2019 09:14 AM    Potassium 3.8 05/07/2019 09:14 AM    Chloride 99 (L) 05/07/2019 09:14 AM    CO2 26 05/07/2019 09:14 AM    Anion gap 11 05/07/2019 09:14 AM    Glucose 137 (H) 05/07/2019 09:14 AM    BUN 43 (H) 05/07/2019 09:14 AM    Creatinine 1.79 (H) 05/07/2019 09:14 AM    BUN/Creatinine ratio 24 (H) 05/07/2019 09:14 AM    GFR est AA 49 (L) 05/07/2019 09:14 AM    GFR est non-AA 41 (L) 05/07/2019 09:14 AM    Calcium 9.1 05/07/2019 09:14 AM    Bilirubin, total 0.4 05/07/2019 09:14 AM    AST (SGOT) 21 05/07/2019 09:14 AM    Alk.  phosphatase 71 05/07/2019 09:14 AM    Protein, total 7.9 05/07/2019 09:14 AM    Albumin 4.3 05/07/2019 09:14 AM    Globulin 3.6 05/07/2019 09:14 AM    A-G Ratio 1.2 05/07/2019 09:14 AM    ALT (SGPT) 35 05/07/2019 09:14 AM     Wt Readings from Last 3 Encounters:   05/31/19 309 lb (140.2 kg)   03/11/19 305 lb 3.2 oz (138.4 kg)   01/28/19 319 lb (144.7 kg)     Last Point of Care HGB A1C  Hemoglobin A1c (POC)   Date Value Ref Range Status   01/28/2019 11.9 % Final      BP Readings from Last 3 Encounters:   05/31/19 134/78   03/11/19 136/77   01/28/19 119/67       Last Point of Care Urine mircoalbumin  Results for orders placed or performed during the hospital encounter of 05/07/19   LIPID PANEL   Result Value Ref Range    LIPID PROFILE          Cholesterol, total 211 (H) <200 MG/DL    Triglyceride 308 (H) <150 MG/DL    HDL Cholesterol 35 (L) 40 - 60 MG/DL    LDL, calculated 114.4 (H) 0 - 100 MG/DL    VLDL, calculated 61.6 MG/DL    CHOL/HDL Ratio 6.0 (H) 0 - 5.0     METABOLIC PANEL, COMPREHENSIVE   Result Value Ref Range    Sodium 136 136 - 145 mmol/L    Potassium 3.8 3.5 - 5.5 mmol/L    Chloride 99 (L) 100 - 108 mmol/L    CO2 26 21 - 32 mmol/L    Anion gap 11 3.0 - 18 mmol/L    Glucose 137 (H) 74 - 99 mg/dL    BUN 43 (H) 7.0 - 18 MG/DL    Creatinine 1.79 (H) 0.6 - 1.3 MG/DL    BUN/Creatinine ratio 24 (H) 12 - 20      GFR est AA 49 (L) >60 ml/min/1.73m2    GFR est non-AA 41 (L) >60 ml/min/1.73m2    Calcium 9.1 8.5 - 10.1 MG/DL    Bilirubin, total 0.4 0.2 - 1.0 MG/DL    ALT (SGPT) 35 16 - 61 U/L    AST (SGOT) 21 15 - 37 U/L    Alk. phosphatase 71 45 - 117 U/L    Protein, total 7.9 6.4 - 8.2 g/dL    Albumin 4.3 3.4 - 5.0 g/dL    Globulin 3.6 2.0 - 4.0 g/dL    A-G Ratio 1.2 0.8 - 1.7       Lab Results   Component Value Date/Time    Microalbumin/Creat ratio (mg/g creat) 139 (H) 01/28/2019 09:00 AM    Microalbumin,urine random 14.30 (H) 01/28/2019 09:00 AM       Last Diabetic Foot Exam on: 1/28/19      Objective:  Visit Vitals  /78 (BP 1 Location: Left arm)   Pulse 81   Temp 98.6 °F (37 °C) (Oral)   Resp 18   Ht 5' 10\" (1.778 m)   Wt 309 lb (140.2 kg)   SpO2 99%   BMI 44.34 kg/m²     Awake and alert in no acute distress   Neck supple without lymphadenopathy, no carotid artery bruits auscultated bilaterally. No thyromegaly   Lungs clear throughout   S1 S2 RRR without ectopy or murmur auscultated.    Extremities: no clubbing, cyanosis, peripheral edema   Integumentary: no rashes   Reviewed vital signs     Results for orders placed or performed in visit on 05/31/19   AMB POC HEMOGLOBIN A1C   Result Value Ref Range    Hemoglobin A1c (POC) 10.5 %   AMB POC URINALYSIS DIP STICK AUTO W/O MICRO   Result Value Ref Range    Color (UA POC) Yellow     Clarity (UA POC) Clear     Glucose (UA POC) 2+ Negative    Bilirubin (UA POC) Negative Negative    Ketones (UA POC) Negative Negative    Specific gravity (UA POC) 1.015 1.001 - 1.035    Blood (UA POC) Trace Negative    pH (UA POC) 5.5 4.6 - 8.0    Protein (UA POC) 2+ Negative    Urobilinogen (UA POC) 0.2 mg/dL 0.2 - 1    Nitrites (UA POC) Negative Negative    Leukocyte esterase (UA POC) Negative Negative       Assessment/Plan:    ICD-10-CM ICD-9-CM    1. Uncontrolled type 2 diabetes mellitus with hyperglycemia (HCC) E11.65 250.02 AMB POC HEMOGLOBIN A1C      MICROALBUMIN, UR, RAND W/ MICROALB/CREAT RATIO   2. Essential hypertension I10 401.9 lisinopril-hydroCHLOROthiazide (PRINZIDE, ZESTORETIC) 20-25 mg per tablet   3. Pure hypercholesterolemia E78.00 272.0    4. Polyuria R35.8 788.42 AMB POC URINALYSIS DIP STICK AUTO W/O MICRO   5. Obesity, Class III, BMI 40-49.9 (morbid obesity) ([facility] E66.01 278.01      Above slightly improved, not at goal  Increase lipitor to 80 mg(will take 2-40mg) and jardiance to 20 mg given he just receive current refill, will send over Jardiance 25mg and lipitor 80 mg tablets to the pharmacy. Issues reviewed with him: all medications, side effects and compliance discussed carefully. I have discussed the diagnosis with the patient and the intended plan as seen in the above orders. The patient has received an after-visit summary and questions were answered concerning future plans. I have discussed medication side effects and warnings with the patient as well. Patient agreeable with above plan and verbalizes understanding. Follow-up and Dispositions    · Return in about 6 weeks (around 7/12/2019) for DM.

## 2019-05-31 NOTE — PATIENT INSTRUCTIONS
Learning About Dietary Guidelines  What are the Dietary Guidelines for Americans? Dietary Guidelines for Americans provide tips for eating well and staying healthy. This helps reduce the risk for long-term (chronic) diseases. These adult guidelines from the [place] recommend that you:  · Eat lots of fruits, vegetables, whole grains, and low-fat or nonfat dairy products. · Try to balance your eating with your activity. This helps you stay at a healthy weight. · Drink alcohol in moderation, if at all. · Limit foods high in salt, saturated fat, trans fat, and added sugar. What is MyPlate? MyPlate is the U.S. government's food guide. It can help you make your own well-balanced eating plan. A balanced eating plan means that you eat enough, but not too much, and that your food gives you the nutrients you need to stay healthy. MyPlate focuses on eating plenty of whole grains, fruits, and vegetables, and on limiting fat and sugar. It is available online at www. ChooseMyPlate.gov. How can you get started? MyPlate suggests that most adults eat certain amounts from the different food groups:  Grains  Eat 5 to 8 ounces of grains each day. Half of those should be whole grains. Choose whole-grain breads, cold and cooked cereals and grains, pasta (without creamy sauces), hard rolls, or low-fat or fat-free crackers. Vegetables  Eat 2 to 3 cups of vegetables every day. They contain little if any fat. And they have lots of nutrients that help protect against heart disease. Fruits  Eat 1½ to 2 cups of fruits every day. Fruits contain very little fat but lots of nutrients. Protein foods  Most adults need 5 to 6½ ounces each day. Choose fish and lean poultry more often. Eat red meat and fried meats less often. Dried beans, tofu, and nuts are also good sources of protein. Dairy  Most adults need 3 cups of milk and milk products a day. Choose low-fat or fat-free products from this food group.  If you have problems digesting milk, try eating cheese or yogurt instead. Limit fats and oils, including those used in cooking. When you do use fats, choose oils that are liquid at room temperature (unsaturated fats). These include canola oil and olive oil. Avoid foods with trans fats, such as many fried foods, cookies, and snack foods. Where can you learn more? Go to http://coco-christopher.info/. Enter X080 in the search box to learn more about \"Learning About Dietary Guidelines. \"  Current as of: March 28, 2018  Content Version: 11.9  © 4951-6408 [employer]. Care instructions adapted under license by [employer] (which disclaims liability or warranty for this information). If you have questions about a medical condition or this instruction, always ask your healthcare professional. [address] any warranty or liability for your use of this information.

## 2019-05-31 NOTE — PROGRESS NOTES
Pt is here for follow up on diabetes  Pt states he has been out of his lisinopril-hctz for a month because no one would send in a refill. Pt states he requested it through this office & his pharmacy. 1. Have you been to the ER, urgent care clinic since your last visit? Hospitalized since your last visit? No    2. Have you seen or consulted any other health care providers outside of the [facility] since your last visit? Include any pap smears or colon screening.  No

## 2019-06-01 LAB
CREAT UR-MCNC: 123 MG/DL (ref 30–125)
MICROALBUMIN UR-MCNC: 69.3 MG/DL (ref 0–3)
MICROALBUMIN/CREAT UR-RTO: 563 MG/G (ref 0–30)

## 2019-06-03 RX ORDER — INSULIN GLARGINE 100 [IU]/ML
100 INJECTION, SOLUTION SUBCUTANEOUS EVERY EVENING
Qty: 3 VIAL | Refills: 2 | Status: SHIPPED | OUTPATIENT
Start: 2019-06-03 | End: 2019-08-13 | Stop reason: SDUPTHER

## 2019-06-27 ENCOUNTER — HOSPITAL ENCOUNTER (OUTPATIENT)
Dept: LAB | Age: 49
Discharge: HOME | End: 2019-06-27
Payer: COMMERCIAL

## 2019-06-27 ENCOUNTER — OFFICE VISIT (OUTPATIENT)
Dept: FAMILY MEDICINE CLINIC | Age: 49
End: 2019-06-27

## 2019-06-27 VITALS
DIASTOLIC BLOOD PRESSURE: 74 MMHG | RESPIRATION RATE: 17 BRPM | BODY MASS INDEX: 43.18 KG/M2 | OXYGEN SATURATION: 99 % | SYSTOLIC BLOOD PRESSURE: 128 MMHG | HEIGHT: 70 IN | TEMPERATURE: 97.9 F | HEART RATE: 82 BPM | WEIGHT: 301.6 LBS

## 2019-06-27 DIAGNOSIS — E11.29 TYPE 2 DIABETES MELLITUS WITH MICROALBUMINURIA, UNSPECIFIED WHETHER LONG TERM INSULIN USE (HCC): ICD-10-CM

## 2019-06-27 DIAGNOSIS — E11.65 TYPE 2 DIABETES MELLITUS WITH HYPERGLYCEMIA, UNSPECIFIED WHETHER LONG TERM INSULIN USE (HCC): ICD-10-CM

## 2019-06-27 DIAGNOSIS — R80.9 TYPE 2 DIABETES MELLITUS WITH MICROALBUMINURIA, UNSPECIFIED WHETHER LONG TERM INSULIN USE (HCC): ICD-10-CM

## 2019-06-27 DIAGNOSIS — E78.1 HYPERTRIGLYCERIDEMIA: ICD-10-CM

## 2019-06-27 DIAGNOSIS — E11.65 TYPE 2 DIABETES MELLITUS WITH HYPERGLYCEMIA, UNSPECIFIED WHETHER LONG TERM INSULIN USE (HCC): Primary | ICD-10-CM

## 2019-06-27 DIAGNOSIS — E78.6 LOW HDL (UNDER 40): ICD-10-CM

## 2019-06-27 LAB
CHOLEST SERPL-MCNC: 173 MG/DL
GLUCOSE POC: 135 MG/DL
HDLC SERPL-MCNC: 37 MG/DL (ref 40–60)
HDLC SERPL: 4.7 {RATIO} (ref 0–5)
LDLC SERPL CALC-MCNC: 91.6 MG/DL (ref 0–100)
LIPID PROFILE,FLP: ABNORMAL
TRIGL SERPL-MCNC: 222 MG/DL (ref ?–150)
VLDLC SERPL CALC-MCNC: 44.4 MG/DL

## 2019-06-27 PROCEDURE — 83036 HEMOGLOBIN GLYCOSYLATED A1C: CPT

## 2019-06-27 PROCEDURE — 80061 LIPID PANEL: CPT

## 2019-06-27 PROCEDURE — 82043 UR ALBUMIN QUANTITATIVE: CPT

## 2019-06-27 PROCEDURE — 36415 COLL VENOUS BLD VENIPUNCTURE: CPT

## 2019-06-27 NOTE — PROGRESS NOTES
Chief Complaint   Patient presents with   [address] [place] [address] Diabetes     Shared Medical Appointment      1. Have you been to the ER, urgent care clinic since your last visit? Hospitalized since your last visit? No    2. Have you seen or consulted any other health care providers outside of the [address] [place] [address] since your last visit? Include any pap smears or colon screening.  No

## 2019-06-27 NOTE — PROGRESS NOTES
SUBJECTIVE:  51 y.o. male for follow up of diabetes. Diabetic Review of Systems - medication compliance: compliant all of the time, diabetic diet compliance: noncompliant some of the time, home glucose monitoring: is performed and is fasting now and reading on his sensor over 300, further diabetic ROS: no polyuria or polydipsia, no chest pain, dyspnea or TIA's, no numbness, tingling or pain in extremities, last eye exam approximately overdue orders needed   acute symptoms are none. Other symptoms and concerns: eats fruit often reviewed standards for ADA Patient agrees with plan and verbalizes understanding. .  Wt Readings from Last 3 Encounters:   06/27/19 301 lb 9.6 oz (136.8 kg)   05/31/19 309 lb (140.2 kg)   03/11/19 305 lb 3.2 oz (138.4 kg)     BP Readings from Last 3 Encounters:   06/27/19 128/74   05/31/19 134/78   03/11/19 136/77     Lab Results   Component Value Date/Time          Hemoglobin A1c (POC) 10.5 05/31/2019 02:28 PM     Lab Results   Component Value Date/Time    Cholesterol, total 173 06/27/2019 04:16 PM    HDL Cholesterol 37 (L) 06/27/2019 04:16 PM    LDL, calculated 91.6 06/27/2019 04:16 PM    VLDL, calculated 44.4 06/27/2019 04:16 PM    Triglyceride 222 (H) 06/27/2019 04:16 PM    CHOL/HDL Ratio 4.7 06/27/2019 04:16 PM       Current Outpatient Medications   Medication Sig Dispense Refill    insulin glargine (LANTUS U-100 INSULIN) 100 unit/mL injection 100 Units by SubCUTAneous route every evening. 3 Vial 2    lisinopril-hydroCHLOROthiazide (PRINZIDE, ZESTORETIC) 20-25 mg per tablet Take 1 Tab by mouth daily. 90 Tab 0    atorvastatin (LIPITOR) 80 mg tablet Take 1 Tab by mouth daily. 90 Tab 1    empagliflozin (JARDIANCE) 25 mg tablet Take 1 Tab by mouth daily. 90 Tab 1    metFORMIN (GLUCOPHAGE) 1,000 mg tablet Take 1 Tab by mouth two (2) times daily (with meals).  60 Tab 3    flash glucose scanning reader ([employer] GERALD 14 DAY READER) [place] Use to monitor blood sugar at least 3 times daily 1 Each 0    flash glucose sensor (FREESTYLE GERALD 14 DAY SENSOR) kit Apply new sensor every 14 days to monitor blood sugar as directed. Please dispense 2 sensors. 1 Kit 11    insulin glargine,hum.rec.anlog (LANTUS U-100 INSULIN SC) 100 Units by SubCUTAneous route every evening.  carvedilol (COREG) 25 mg tablet Take 25 mg by mouth two (2) times daily (with meals).  multivitamin (ONE A DAY) tablet Take 1 Tab by mouth daily. PMH: reviewed medications and allergy lists and medical and family history. OBJECTIVE:  Awake and alert in no acute distress  Neck supple without lymphadenopathy, no carotid artery bruits auscultated bilaterally. No thyromegaly  Lungs clear throughout  S1 S2 RRR without ectopy or murmur auscultated. Extremities: no clubbing, cyanosis, peripheral edema  Integumentary: no rashes  Reviewed vital signs   Visit Vitals  /74 (BP 1 Location: Left arm, BP Patient Position: Sitting)   Pulse 82   Temp 97.9 °F (36.6 °C) (Oral)   Resp 17   Ht 5' 10\" (1.778 m)   Wt 301 lb 9.6 oz (136.8 kg)   SpO2 99%   BMI 43.28 kg/m²     Results for orders placed or performed in visit on 06/27/19   AMB POC GLUCOSE BLOOD, BY GLUCOSE MONITORING DEVICE   Result Value Ref Range    Glucose  mg/dL       Diagnoses and all orders for this visit:    Type 2 diabetes mellitus with hyperglycemia, unspecified whether long term insulin use (HCC)  -     LIPID PANEL; Future  -     MICROALBUMIN, UR, RAND W/ MICROALB/CREAT RATIO; Future  -     REFERRAL TO OPHTHALMOLOGY  -     AMB POC GLUCOSE BLOOD, BY GLUCOSE MONITORING DEVICE  -     HEMOGLOBIN A1C WITH EAG; Future    BMI 45.0-49.9, adult (HCC)    Hypertriglyceridemia    Low HDL (under 40)    Type 2 diabetes mellitus with microalbuminuria, unspecified whether long term insulin use ([facility] [address]    I have reviewed/discussed the above normal BMI with the patient.   I have recommended the following interventions: dietary management education, guidance, and counseling, encourage exercise, monitor weight and prescribed dietary intake . Candance Huger [employer] ADA diet and exercise. Patient agrees with plan and verbalizes understanding. I have discussed the diagnosis with the patient and the intended plan as seen in the above orders. The patient has received an after-visit summary and questions were answered concerning future plans. I have discussed medication side effects and warnings with the patient as well. Follow-up and Dispositions    · Return in about 2 months (around 8/27/2019) for dm type 2.

## 2019-06-28 LAB
CREAT UR-MCNC: 96 MG/DL (ref 30–125)
EST. AVERAGE GLUCOSE BLD GHB EST-MCNC: 237 MG/DL
HBA1C MFR BLD: 9.9 % (ref 4.2–5.6)
MICROALBUMIN UR-MCNC: 19.9 MG/DL (ref 0–3)
MICROALBUMIN/CREAT UR-RTO: 207 MG/G (ref 0–30)

## 2019-07-03 NOTE — PROGRESS NOTES
His renal function improved. His hemoglobin A 1 C has not. He is adhering to medications but he eats a lot of fruit. We discussed this in the shared meeting and I did offer that he could eat lower glycemic fruit choices and increase his fiber. We also emphasized walking to increase the HDL. German Benavides Conception CFNP

## 2019-08-07 DIAGNOSIS — E11.65 TYPE 2 DIABETES MELLITUS WITH HYPERGLYCEMIA, UNSPECIFIED WHETHER LONG TERM INSULIN USE (HCC): Primary | ICD-10-CM

## 2019-08-07 RX ORDER — METFORMIN HYDROCHLORIDE 1000 MG/1
1000 TABLET ORAL 2 TIMES DAILY WITH MEALS
Qty: 60 TAB | Refills: 3 | Status: SHIPPED | OUTPATIENT
Start: 2019-08-07 | End: 2019-10-29 | Stop reason: SDUPTHER

## 2019-08-13 DIAGNOSIS — E11.65 TYPE 2 DIABETES MELLITUS WITH HYPERGLYCEMIA, UNSPECIFIED WHETHER LONG TERM INSULIN USE (HCC): Primary | ICD-10-CM

## 2019-08-13 DIAGNOSIS — I10 ESSENTIAL HYPERTENSION: ICD-10-CM

## 2019-08-14 RX ORDER — LISINOPRIL AND HYDROCHLOROTHIAZIDE 20; 25 MG/1; MG/1
1 TABLET ORAL DAILY
Qty: 90 TAB | Refills: 1 | Status: SHIPPED | OUTPATIENT
Start: 2019-08-14 | End: 2020-01-28 | Stop reason: SDUPTHER

## 2019-08-14 RX ORDER — INSULIN GLARGINE 100 [IU]/ML
100 INJECTION, SOLUTION SUBCUTANEOUS EVERY EVENING
Qty: 3 VIAL | Refills: 2 | Status: SHIPPED | OUTPATIENT
Start: 2019-08-14 | End: 2020-01-28 | Stop reason: SDUPTHER

## 2019-08-27 ENCOUNTER — OFFICE VISIT (OUTPATIENT)
Dept: FAMILY MEDICINE CLINIC | Age: 49
End: 2019-08-27

## 2019-08-27 VITALS
SYSTOLIC BLOOD PRESSURE: 142 MMHG | OXYGEN SATURATION: 98 % | HEART RATE: 100 BPM | WEIGHT: 299 LBS | TEMPERATURE: 98.4 F | RESPIRATION RATE: 16 BRPM | HEIGHT: 70 IN | DIASTOLIC BLOOD PRESSURE: 80 MMHG | BODY MASS INDEX: 42.8 KG/M2

## 2019-08-27 DIAGNOSIS — R35.0 URINARY FREQUENCY: Primary | ICD-10-CM

## 2019-08-27 DIAGNOSIS — E11.65 TYPE 2 DIABETES MELLITUS WITH HYPERGLYCEMIA, UNSPECIFIED WHETHER LONG TERM INSULIN USE (HCC): ICD-10-CM

## 2019-08-27 DIAGNOSIS — N52.9 MALE ERECTILE DISORDER: ICD-10-CM

## 2019-08-27 LAB
BILIRUB UR QL STRIP: NEGATIVE
GLUCOSE UR-MCNC: NORMAL MG/DL
HBA1C MFR BLD HPLC: 10.4 %
KETONES P FAST UR STRIP-MCNC: NEGATIVE MG/DL
PH UR STRIP: 5.5 [PH] (ref 4.6–8)
PROT UR QL STRIP: NORMAL
SP GR UR STRIP: 1.01 (ref 1–1.03)
UA UROBILINOGEN AMB POC: NORMAL (ref 0.2–1)
URINALYSIS CLARITY POC: CLEAR
URINALYSIS COLOR POC: YELLOW
URINE BLOOD POC: NORMAL
URINE LEUKOCYTES POC: NEGATIVE
URINE NITRITES POC: NEGATIVE

## 2019-08-27 NOTE — PROGRESS NOTES
Subjective:    Rodríguez Ryan is a 51y.o. year old male seen for follow up of diabetes. He also has hypertension and hyperlipidemia. Diabetic Review of Systems - medication compliance: compliant all of the time, diabetic diet compliance: noncompliant some of the time, home glucose monitoring: is performed regularly, fasting values range 140s-150s, further diabetic ROS: no polyuria or polydipsia, no chest pain, dyspnea or TIA's, no numbness, tingling or pain in extremities. Other symptoms and concerns: states he has been having left knee pain, bilateral anterior lower leg pain and also pain in feet. States knee pain has been present for the last 2-3 weeks. Denies swelling, denies injury. States if he sits for a prolonged period and then gets up he will have increased pain. Pain is located to left inner/lower knee. Comments he needs to have a form completed regarding his history of intermittent knee pain. Patient Active Problem List   Diagnosis Code    Ob[facility], morbid ([facility] E66.01     Current Outpatient Medications   Medication Sig Dispense Refill    lisinopril-hydroCHLOROthiazide (PRINZIDE, ZESTORETIC) 20-25 mg per tablet Take 1 Tab by mouth daily. 90 Tab 1    insulin glargine (LANTUS U-100 INSULIN) 100 unit/mL injection 100 Units by SubCUTAneous route every evening. 3 Vial 2    metFORMIN (GLUCOPHAGE) 1,000 mg tablet Take 1 Tab by mouth two (2) times daily (with meals). 60 Tab 3    atorvastatin (LIPITOR) 80 mg tablet Take 1 Tab by mouth daily. 90 Tab 1    empagliflozin (JARDIANCE) 25 mg tablet Take 1 Tab by mouth daily. 90 Tab 1    flash glucose scanning reader (FREESTYLE GERALD 14 DAY READER) [facility] Use to monitor blood sugar at least 3 times daily 1 Each 0    flash glucose sensor (FREESTYLE GERALD 14 DAY SENSOR) kit Apply new sensor every 14 days to monitor blood sugar as directed. Please dispense 2 sensors.  1 Kit 11    carvedilol (COREG) 25 mg tablet Take 25 mg by mouth two (2) times daily (with meals).  multivitamin (ONE A DAY) tablet Take 1 Tab by mouth daily. Allergies   Allergen Reactions    Pcn [Penicillins] Other (comments)     Unknown was told when a child     Past Surgical History:   Procedure Laterality Date    HX APPENDECTOMY       Family History   Problem Relation Age of Onset    Cancer Mother         unknown ? breast    Heart Disease Father     No Known Problems Sister     Cancer Brother     No Known Problems Sister     No Known Problems Brother     No Known Problems Brother     Diabetes Daughter     Diabetes Daughter       Lab Results   Component Value Date/Time    Cholesterol, total 173 06/27/2019 04:16 PM    HDL Cholesterol 37 (L) 06/27/2019 04:16 PM    LDL, calculated 91.6 06/27/2019 04:16 PM    VLDL, calculated 44.4 06/27/2019 04:16 PM    Triglyceride 222 (H) 06/27/2019 04:16 PM    CHOL/HDL Ratio 4.7 06/27/2019 04:16 PM     Lab Results   Component Value Date/Time    Sodium 136 05/07/2019 09:14 AM    Potassium 3.8 05/07/2019 09:14 AM    Chloride 99 (L) 05/07/2019 09:14 AM    CO2 26 05/07/2019 09:14 AM    Anion gap 11 05/07/2019 09:14 AM    Glucose 137 (H) 05/07/2019 09:14 AM    BUN 43 (H) 05/07/2019 09:14 AM    Creatinine 1.79 (H) 05/07/2019 09:14 AM    BUN/Creatinine ratio 24 (H) 05/07/2019 09:14 AM    GFR est AA 49 (L) 05/07/2019 09:14 AM    GFR est non-AA 41 (L) 05/07/2019 09:14 AM    Calcium 9.1 05/07/2019 09:14 AM    Bilirubin, total 0.4 05/07/2019 09:14 AM    AST (SGOT) 21 05/07/2019 09:14 AM    Alk.  phosphatase 71 05/07/2019 09:14 AM    Protein, total 7.9 05/07/2019 09:14 AM    Albumin 4.3 05/07/2019 09:14 AM    Globulin 3.6 05/07/2019 09:14 AM    A-G Ratio 1.2 05/07/2019 09:14 AM    ALT (SGPT) 35 05/07/2019 09:14 AM     Lab Results   Component Value Date/Time    Hemoglobin A1c 9.9 (H) 06/27/2019 04:16 PM    Hemoglobin A1c (POC) 10.5 05/31/2019 02:28 PM     Lab Results   Component Value Date/Time    Microalbumin/Creat ratio (mg/g creat) 207 (H) 06/27/2019 04:16 PM    Microalbumin,urine random 19.90 (H) 06/27/2019 04:16 PM     Wt Readings from Last 3 Encounters:   08/27/19 299 lb (135.6 kg)   06/27/19 301 lb 9.6 oz (136.8 kg)   05/31/19 309 lb (140.2 kg)     Last Point of Care HGB A1C  Hemoglobin A1c (POC)   Date Value Ref Range Status   05/31/2019 10.5 % Final      BP Readings from Last 3 Encounters:   08/27/19 (!) 159/107   06/27/19 128/74   05/31/19 134/78       Results for orders placed or performed during the hospital encounter of 06/27/19   LIPID PANEL   Result Value Ref Range    LIPID PROFILE          Cholesterol, total 173 <200 MG/DL    Triglyceride 222 (H) <150 MG/DL    HDL Cholesterol 37 (L) 40 - 60 MG/DL    LDL, calculated 91.6 0 - 100 MG/DL    VLDL, calculated 44.4 MG/DL    CHOL/HDL Ratio 4.7 0 - 5.0     MICROALBUMIN, UR, RAND W/ MICROALB/CREAT RATIO   Result Value Ref Range    Microalbumin,urine random 19.90 (H) 0 - 3.0 MG/DL    Creatinine, urine 96.00 30 - 125 mg/dL    Microalbumin/Creat ratio (mg/g creat) 207 (H) 0 - 30 mg/g   HEMOGLOBIN A1C WITH EAG   Result Value Ref Range    Hemoglobin A1c 9.9 (H) 4.2 - 5.6 %    Est. average glucose 237 mg/dL       Last Diabetic Foot Exam on: 1/28/19      Objective:  Visit Vitals  /80 (BP 1 Location: Right arm, BP Patient Position: Sitting) Comment: has not taken medication today   Pulse 100   Temp 98.4 °F (36.9 °C) (Oral)   Resp 16   Ht 5' 10\" (1.778 m)   Wt 299 lb (135.6 kg)   SpO2 98%   BMI 42.90 kg/m²     Awake and alert in no acute distress   Neck supple without lymphadenopathy, no carotid artery bruits auscultated bilaterally. No thyromegaly   Lungs clear throughout   S1 S2 RRR without ectopy or murmur auscultated. Extremities: no clubbing, cyanosis, peripheral edema   Abdomen - soft, nontender, nondistended, no masses or organomegaly  Integumentary: no rashes   Reviewed vital signs           Assessment/Plan:    ICD-10-CM ICD-9-CM    1.  Urinary frequency R35.0 788.41 AMB POC URINALYSIS DIP STICK AUTO W/O MICRO   2. Type 2 diabetes mellitus with hyperglycemia, unspecified whether long term insulin use (HCC) E11.65 250.00 AMB POC HEMOGLOBIN A1C   3. Male erectile disorder N52.9 607.84 TESTOSTERONE, FREE & TOTAL      PROLACTIN      FSH AND LH      TSH 3RD GENERATION      METABOLIC PANEL, COMPREHENSIVE      CBC WITH AUTOMATED DIFF     Orders Placed This Encounter    TESTOSTERONE, FREE & TOTAL    PROLACTIN    FSH AND LH    TSH 3RD GENERATION    METABOLIC PANEL, COMPREHENSIVE    CBC WITH AUTOMATED DIFF    AMB POC URINALYSIS DIP STICK AUTO W/O MICRO    AMB POC HEMOGLOBIN A1C     States will bring form in for completion  needs improvement, patient poorly compliant  Issues reviewed with him: low cholesterol diet, weight control and daily exercise discussed, all medications, side effects and compliance discussed carefully, foot care discussed and Podiatry visits discussed and annual eye examinations at Ophthalmology discussed. I have discussed the diagnosis with the patient and the intended plan as seen in the above orders. The patient has received an after-visit summary and questions were answered concerning future plans. I have discussed medication side effects and warnings with the patient as well. Patient agreeable with above plan and verbalizes understanding. Follow-up and Dispositions    · Return in about 2 months (around 10/27/2019) for DM.

## 2019-08-27 NOTE — PROGRESS NOTES
Reina Arango presents today for   Chief Complaint   Patient presents with    Diabetes       Is someone accompanying this pt? no    Is the patient using any DME equipment during OV? no    Depression Screening:  3 most recent PHQ Screens 1/28/2019   Little interest or pleasure in doing things More than half the days   Feeling down, depressed, irritable, or hopeless Several days   Total Score PHQ 2 3   Trouble falling or staying asleep, or sleeping too much Not at all   Feeling tired or having little energy More than half the days   Poor appetite, weight loss, or overeating More than half the days   Feeling bad about yourself - or that you are a failure or have let yourself or your family down Nearly every day   Trouble concentrating on things such as school, work, reading, or watching TV Several days   Moving or speaking so slowly that other people could have noticed; or the opposite being so fidgety that others notice Several days   Thoughts of being better off dead, or hurting yourself in some way More than half the days   PHQ 9 Score 14   How difficult have these problems made it for you to do your work, take care of your home and get along with others Very difficult       Learning Assessment:  Learning Assessment 1/28/2019   PRIMARY LEARNER Patient   HIGHEST LEVEL OF EDUCATION - PRIMARY LEARNER  2 YEARS OF COLLEGE   BARRIERS PRIMARY LEARNER NONE   CO-LEARNER CAREGIVER No   PRIMARY LANGUAGE ENGLISH   LEARNER PREFERENCE PRIMARY LISTENING   ANSWERED BY self   RELATIONSHIP SELF       Abuse Screening:  No flowsheet data found. Fall Risk  No flowsheet data found. Health Maintenance reviewed and discussed and ordered per Provider. Health Maintenance Due   Topic Date Due    Pneumococcal 0-64 years (1 of 1 - PPSV23) 01/13/1977    EYE EXAM RETINAL OR DILATED  01/13/1981    Influenza Age 5 to Adult  08/01/2019           Coordination of Care:  1.  Have you been to the ER, urgent care clinic since your last visit? Hospitalized since your last visit? no    2. Have you seen or consulted any other health care providers outside of the [address] [place] [address] since your last visit? Include any pap smears or colon screening.  no

## 2019-08-27 NOTE — PATIENT INSTRUCTIONS
Diabetes Foot Health: Care Instructions  Your Care Instructions    When you have diabetes, your feet need extra care and attention. Diabetes can damage the nerve endings and blood vessels in your feet, making you less likely to notice when your feet are injured. Diabetes also limits your body's ability to fight infection and get blood to areas that need it. If you get a minor foot injury, it could become an ulcer or a serious infection. With good foot care, you can prevent most of these problems. Caring for your feet can be quick and easy. Most of the care can be done when you are bathing or getting ready for bed. Follow-up care is a key part of your treatment and safety. Be sure to make and go to all appointments, and call your doctor if you are having problems. It's also a good idea to know your test results and keep a list of the medicines you take. How can you care for yourself at home? · Keep your blood sugar close to normal by watching what and how much you eat, monitoring blood sugar, taking medicines if prescribed, and getting regular exercise. · Do not smoke. Smoking affects blood flow and can make foot problems worse. If you need help quitting, talk to your doctor about stop-smoking programs and medicines. These can increase your chances of quitting for good. · Eat a diet that is low in fats. High fat intake can cause fat to build up in your blood vessels and decrease blood flow. · Inspect your feet daily for blisters, cuts, cracks, or sores. If you cannot see well, use a mirror or have someone help you. · Take care of your feet:  ? Wash your feet every day. Use warm (not hot) water. Check the water temperature with your wrists or other part of your body, not your feet. ? Dry your feet well. Pat them dry. Do not rub the skin on your feet too hard. Dry well between your toes. If the skin on your feet stays moist, bacteria or a fungus can grow, which can lead to infection. ?  Keep your skin soft. Use moisturizing skin cream to keep the skin on your feet soft and prevent calluses and cracks. But do not put the cream between your toes, and stop using any cream that causes a rash. ? Clean underneath your toenails carefully. Do not use a sharp object to clean underneath your toenails. Use the blunt end of a nail file or other rounded tool. ? Trim and file your toenails straight across to prevent ingrown toenails. Use a nail clipper, not scissors. Use an emery board to smooth the edges. · Change socks daily. Socks without seams are best, because seams often rub the feet. You can find socks for people with diabetes from specialty catalogs. · Look inside your shoes every day for things like gravel or torn linings, which could cause blisters or sores. · Buy shoes that fit well:  ? Look for shoes that have plenty of space around the toes. This helps prevent bunions and blisters. ? Try on shoes while wearing the kind of socks you will usually wear with the shoes. ? Avoid plastic shoes. They may rub your feet and cause blisters. Good shoes should be made of materials that are flexible and breathable, such as leather or cloth. ? Break in new shoes slowly by wearing them for no more than an hour a day for several days. Take extra time to check your feet for red areas, blisters, or other problems after you wear new shoes. · Do not go barefoot. Do not wear sandals, and do not wear shoes with very thin soles. Thin soles are easy to puncture. They also do not protect your feet from hot pavement or cold weather. · Have your doctor check your feet during each visit. If you have a foot problem, see your doctor. Do not try to treat an early foot problem at home. Home remedies or treatments that you can buy without a prescription (such as corn removers) can be harmful. · Always get early treatment for foot problems. A minor irritation can lead to a major problem if not properly cared for early.   When should you call for help? Call your doctor now or seek immediate medical care if:    · You have a foot sore, an ulcer or break in the skin that is not healing after 4 days, bleeding corns or calluses, or an ingrown toenail.     · You have blue or black areas, which can mean bruising or blood flow problems.     · You have peeling skin or tiny blisters between your toes or cracking or oozing of the skin.     · You have a fever for more than 24 hours and a foot sore.     · You have new numbness or tingling in your feet that does not go away after you move your feet or change positions.     · You have unexplained or unusual swelling of the foot or ankle.    Watch closely for changes in your health, and be sure to contact your doctor if:    · You cannot do proper foot care. Where can you learn more? Go to http://coco-christopher.info/. Enter A739 in the search box to learn more about \"Diabetes Foot Health: Care Instructions. \"  Current as of: July 25, 2018  Content Version: 12.1  © 5840-2789 [employer]. Care instructions adapted under license by [employer] (which disclaims liability or warranty for this information). If you have questions about a medical condition or this instruction, always ask your healthcare professional. [address] any warranty or liability for your use of this information.

## 2019-08-28 ENCOUNTER — HOSPITAL ENCOUNTER (OUTPATIENT)
Dept: LAB | Age: 49
Discharge: HOME | End: 2019-08-28
Payer: COMMERCIAL

## 2019-08-28 DIAGNOSIS — N52.9 MALE ERECTILE DISORDER: ICD-10-CM

## 2019-08-28 LAB
ALBUMIN SERPL-MCNC: 4 G/DL (ref 3.4–5)
ALBUMIN/GLOB SERPL: 1.1 {RATIO} (ref 0.8–1.7)
ALP SERPL-CCNC: 93 U/L (ref 45–117)
ALT SERPL-CCNC: 47 U/L (ref 16–61)
ANION GAP SERPL CALC-SCNC: 5 MMOL/L (ref 3–18)
AST SERPL-CCNC: 34 U/L (ref 10–38)
BASOPHILS # BLD: 0 K/UL (ref 0–0.1)
BASOPHILS NFR BLD: 0 % (ref 0–2)
BILIRUB SERPL-MCNC: 0.4 MG/DL (ref 0.2–1)
BUN SERPL-MCNC: 38 MG/DL (ref 7–18)
BUN/CREAT SERPL: 22 (ref 12–20)
CALCIUM SERPL-MCNC: 9.7 MG/DL (ref 8.5–10.1)
CHLORIDE SERPL-SCNC: 94 MMOL/L (ref 100–111)
CO2 SERPL-SCNC: 33 MMOL/L (ref 21–32)
CREAT SERPL-MCNC: 1.74 MG/DL (ref 0.6–1.3)
DIFFERENTIAL METHOD BLD: NORMAL
EOSINOPHIL # BLD: 0.1 K/UL (ref 0–0.4)
EOSINOPHIL NFR BLD: 1 % (ref 0–5)
ERYTHROCYTE [DISTWIDTH] IN BLOOD BY AUTOMATED COUNT: 13.2 % (ref 11.6–14.5)
GLOBULIN SER CALC-MCNC: 3.6 G/DL (ref 2–4)
GLUCOSE SERPL-MCNC: 314 MG/DL (ref 74–99)
HCT VFR BLD AUTO: 44.1 % (ref 36–48)
HGB BLD-MCNC: 14 G/DL (ref 13–16)
LYMPHOCYTES # BLD: 2.3 K/UL (ref 0.9–3.6)
LYMPHOCYTES NFR BLD: 44 % (ref 21–52)
MCH RBC QN AUTO: 26.9 PG (ref 24–34)
MCHC RBC AUTO-ENTMCNC: 31.7 G/DL (ref 31–37)
MCV RBC AUTO: 84.6 FL (ref 74–97)
MONOCYTES # BLD: 0.4 K/UL (ref 0.05–1.2)
MONOCYTES NFR BLD: 8 % (ref 3–10)
NEUTS SEG # BLD: 2.5 K/UL (ref 1.8–8)
NEUTS SEG NFR BLD: 47 % (ref 40–73)
PLATELET # BLD AUTO: 212 K/UL (ref 135–420)
PMV BLD AUTO: 11.6 FL (ref 9.2–11.8)
POTASSIUM SERPL-SCNC: 4.8 MMOL/L (ref 3.5–5.5)
PROT SERPL-MCNC: 7.6 G/DL (ref 6.4–8.2)
RBC # BLD AUTO: 5.21 M/UL (ref 4.7–5.5)
SODIUM SERPL-SCNC: 132 MMOL/L (ref 136–145)
TSH SERPL DL<=0.05 MIU/L-ACNC: 2.67 UIU/ML (ref 0.36–3.74)
WBC # BLD AUTO: 5.2 K/UL (ref 4.6–13.2)

## 2019-08-28 PROCEDURE — 36415 COLL VENOUS BLD VENIPUNCTURE: CPT

## 2019-08-28 PROCEDURE — 80053 COMPREHEN METABOLIC PANEL: CPT

## 2019-08-28 PROCEDURE — 83001 ASSAY OF GONADOTROPIN (FSH): CPT

## 2019-08-28 PROCEDURE — 84403 ASSAY OF TOTAL TESTOSTERONE: CPT

## 2019-08-28 PROCEDURE — 84443 ASSAY THYROID STIM HORMONE: CPT

## 2019-08-28 PROCEDURE — 85025 COMPLETE CBC W/AUTO DIFF WBC: CPT

## 2019-08-28 PROCEDURE — 84146 ASSAY OF PROLACTIN: CPT

## 2019-08-29 LAB
FSH SERPL-ACNC: 6 MIU/ML
LH SERPL-ACNC: 10.8 MIU/ML
PROLACTIN SERPL-MCNC: 8.4 NG/ML

## 2019-08-30 LAB
TESTOST FREE SERPL-MCNC: 3.7 PG/ML (ref 6.8–21.5)
TESTOST SERPL-MCNC: 129 NG/DL (ref 264–916)

## 2019-09-03 DIAGNOSIS — E29.1 HYPOGONADISM MALE: Primary | ICD-10-CM

## 2019-09-05 RX ORDER — SILDENAFIL 50 MG/1
50 TABLET, FILM COATED ORAL AS NEEDED
Qty: 5 TAB | Refills: 0 | Status: SHIPPED | OUTPATIENT
Start: 2019-09-05 | End: 2019-10-15 | Stop reason: SDUPTHER

## 2019-09-10 ENCOUNTER — TELEPHONE (OUTPATIENT)
Dept: FAMILY MEDICINE CLINIC | Age: 49
End: 2019-09-10

## 2019-09-10 NOTE — TELEPHONE ENCOUNTER
Spoke to patient and advised of recent lab results as well as referral to endocrinology. All questions were answered. Office note and labs faxed on 9/9/19.

## 2019-09-13 ENCOUNTER — TELEPHONE (OUTPATIENT)
Dept: FAMILY MEDICINE CLINIC | Age: 49
End: 2019-09-13

## 2019-09-16 ENCOUNTER — OFFICE VISIT (OUTPATIENT)
Dept: FAMILY MEDICINE CLINIC | Age: 49
End: 2019-09-16

## 2019-09-16 VITALS
OXYGEN SATURATION: 98 % | DIASTOLIC BLOOD PRESSURE: 76 MMHG | SYSTOLIC BLOOD PRESSURE: 115 MMHG | WEIGHT: 300 LBS | HEART RATE: 101 BPM | TEMPERATURE: 98.7 F | HEIGHT: 70 IN | RESPIRATION RATE: 17 BRPM | BODY MASS INDEX: 42.95 KG/M2

## 2019-09-16 DIAGNOSIS — L02.416 ABSCESS OF LEFT LEG: Primary | ICD-10-CM

## 2019-09-16 RX ORDER — CLINDAMYCIN HYDROCHLORIDE 300 MG/1
300 CAPSULE ORAL 3 TIMES DAILY
Qty: 30 CAP | Refills: 0 | Status: SHIPPED | OUTPATIENT
Start: 2019-09-16 | End: 2019-09-26

## 2019-09-16 NOTE — PROGRESS NOTES
Kolby Headley presents today for   Chief Complaint   Patient presents with    Skin Problem     area on shin Left       Is someone accompanying this pt? no    Is the patient using any DME equipment during OV? no    Depression Screening:  3 most recent PHQ Screens 1/28/2019   Little interest or pleasure in doing things More than half the days   Feeling down, depressed, irritable, or hopeless Several days   Total Score PHQ 2 3   Trouble falling or staying asleep, or sleeping too much Not at all   Feeling tired or having little energy More than half the days   Poor appetite, weight loss, or overeating More than half the days   Feeling bad about yourself - or that you are a failure or have let yourself or your family down Nearly every day   Trouble concentrating on things such as school, work, reading, or watching TV Several days   Moving or speaking so slowly that other people could have noticed; or the opposite being so fidgety that others notice Several days   Thoughts of being better off dead, or hurting yourself in some way More than half the days   PHQ 9 Score 14   How difficult have these problems made it for you to do your work, take care of your home and get along with others Very difficult       Learning Assessment:  Learning Assessment 1/28/2019   PRIMARY LEARNER Patient   HIGHEST LEVEL OF EDUCATION - PRIMARY LEARNER  2 YEARS OF COLLEGE   BARRIERS PRIMARY LEARNER NONE   CO-LEARNER CAREGIVER No   PRIMARY LANGUAGE ENGLISH   LEARNER PREFERENCE PRIMARY LISTENING   ANSWERED BY self   RELATIONSHIP SELF       Abuse Screening:  No flowsheet data found. Fall Risk  No flowsheet data found. Health Maintenance reviewed and discussed and ordered per Provider. Health Maintenance Due   Topic Date Due    Pneumococcal 0-64 years (1 of 1 - PPSV23) 01/13/1977    EYE EXAM RETINAL OR DILATED  01/13/1981    Influenza Age 5 to Adult  08/01/2019           Coordination of Care:  1.  Have you been to the ER, urgent care clinic since your last visit? Hospitalized since your last visit? no    2. Have you seen or consulted any other health care providers outside of the [address] [place] [address] since your last visit? Include any pap smears or colon screening.  no

## 2019-09-16 NOTE — PROGRESS NOTES
HISTORY OF PRESENT ILLNESS  Yayo Elmore is a 51 y.o. male. Patient states he noticed sore for the last 2-3 weeks, increased in size and pain over the last week, greatly increased since Friday. Has tried heat without improvement    Allergies   Allergen Reactions    Pcn [Penicillins] Other (comments)     Unknown was told when a child     Current Outpatient Medications   Medication Sig Dispense Refill    sildenafil citrate (VIAGRA) 50 mg tablet Take 1 Tab by mouth as needed (45 mins prior to intercourse). 5 Tab 0    lisinopril-hydroCHLOROthiazide (PRINZIDE, ZESTORETIC) 20-25 mg per tablet Take 1 Tab by mouth daily. 90 Tab 1    insulin glargine (LANTUS U-100 INSULIN) 100 unit/mL injection 100 Units by SubCUTAneous route every evening. 3 Vial 2    metFORMIN (GLUCOPHAGE) 1,000 mg tablet Take 1 Tab by mouth two (2) times daily (with meals). 60 Tab 3    atorvastatin (LIPITOR) 80 mg tablet Take 1 Tab by mouth daily. 90 Tab 1    empagliflozin (JARDIANCE) 25 mg tablet Take 1 Tab by mouth daily. 90 Tab 1    flash glucose scanning reader (FREESTYLE GERALD 14 DAY READER) [facility] Use to monitor blood sugar at least 3 times daily 1 Each 0    flash glucose sensor (FREESTYLE GERALD 14 DAY SENSOR) kit Apply new sensor every 14 days to monitor blood sugar as directed. Please dispense 2 sensors. 1 Kit 11    carvedilol (COREG) 25 mg tablet Take 25 mg by mouth two (2) times daily (with meals).  multivitamin (ONE A DAY) tablet Take 1 Tab by mouth daily.        Past Medical History:   Diagnosis Date    Diabetes ([facility] [place] [address]     Hypercholesterolemia     Hypertension      Social History     Socioeconomic History    Marital status:      Spouse name: Not on file    Number of children: Not on file    Years of education: Not on file    Highest education level: Not on file   Occupational History    Not on file   Social Needs    Financial resource strain: Not on file    Food insecurity:     Worry: Not on file     Inability: Not on file    Transportation needs:     Medical: Not on file     Non-medical: Not on file   Tobacco Use    Smoking status: Never Smoker    Smokeless tobacco: Never Used   Substance and Sexual Activity    Alcohol use: Yes     Comment: Occassional use    Drug use: Not on file     Comment: Occassional use    Sexual activity: Yes     Partners: Female     Birth control/protection: None   Lifestyle    Physical activity:     Days per week: Not on file     Minutes per session: Not on file    Stress: Not on file   Relationships    Social connections:     Talks on phone: Not on file     Gets together: Not on file     Attends [employer] service: Not on file     Active member of club or organization: Not on file     Attends meetings of clubs or organizations: Not on file     Relationship status: Not on file    Intimate partner violence:     Fear of current or ex partner: Not on file     Emotionally abused: Not on file     Physically abused: Not on file     Forced sexual activity: Not on file   Other Topics Concern    Not on file   Social History Narrative    Not on file     Wt Readings from Last 3 Encounters:   09/16/19 300 lb (136.1 kg)   08/27/19 299 lb (135.6 kg)   06/27/19 301 lb 9.6 oz (136.8 kg)     BP Readings from Last 3 Encounters:   09/16/19 115/76   08/27/19 142/80   06/27/19 128/74     Review of Systems   Skin:        Tender mass to left anterior lower leg     /76 (BP 1 Location: Left arm, BP Patient Position: Sitting)   Pulse (!) 101   Temp 98.7 °F (37.1 °C) (Oral)   Resp 17   Ht 5' 10\" (1.778 m)   Wt 300 lb (136.1 kg)   SpO2 98%   BMI 43.05 kg/m²      Physical Exam   Constitutional: He is oriented to person, place, and time. He appears well-developed and well-nourished. HENT:   Head: Normocephalic and atraumatic. Neck: Normal range of motion. Neck supple. Cardiovascular: Normal rate, regular rhythm and normal heart sounds. Exam reveals no gallop and no friction rub.    No murmur heard.  Pulmonary/Chest: Effort normal and breath sounds normal. He has no wheezes. He has no rhonchi. He has no rales. Neurological: He is alert and oriented to person, place, and time. Skin: Skin is warm and dry. Small area of fluctuance to left anterior shin with scarring present. ASSESSMENT and PLAN    ICD-10-CM ICD-9-CM    1. Abscess of left leg L02.416 682.6      Orders Placed This Encounter    clindamycin (CLEOCIN) 300 mg capsule     alarm signs when to seek emergent care provided and reviewed   I have discussed the diagnosis with the patient and the intended plan as seen in the above orders. The patient has received an after-visit summary and questions were answered concerning future plans. I have discussed medication side effects and warnings with the patient as well. Patient agreeable with above plan and verbalizes understanding. Follow-up and Dispositions    · Return in about 2 weeks (around 9/30/2019) for left leg abscess.

## 2019-09-16 NOTE — LETTER
NOTIFICATION RETURN TO WORK  
 
9/16/2019 3:09 PM 
 
Mr. Cristi Ann 
[address] [facility] [id number] To Whom It May Concern: 
 
Cristi Ann is currently under the care of [address]. He will return to work on: 9/17/19 If there are questions or concerns please have the patient contact our office.  
 
 
 
Sincerely, 
 
 
Genet Victor NP

## 2019-09-16 NOTE — PATIENT INSTRUCTIONS
Skin Abscess: Care Instructions  Your Care Instructions    A skin abscess is a bacterial infection that forms a pocket of pus. A boil is a kind of skin abscess. The doctor may have cut an opening in the abscess so that the pus can drain out. You may have gauze in the cut so that the abscess will stay open and keep draining. You may need antibiotics. You will need to follow up with your doctor to make sure the infection has gone away. The doctor has checked you carefully, but problems can develop later. If you notice any problems or new symptoms, get medical treatment right away. Follow-up care is a key part of your treatment and safety. Be sure to make and go to all appointments, and call your doctor if you are having problems. It's also a good idea to know your test results and keep a list of the medicines you take. How can you care for yourself at home? · Apply warm and dry compresses, a heating pad set on low, or a hot water bottle 3 or 4 times a day for pain. Keep a cloth between the heat source and your skin. · If your doctor prescribed antibiotics, take them as directed. Do not stop taking them just because you feel better. You need to take the full course of antibiotics. · Take pain medicines exactly as directed. ? If the doctor gave you a prescription medicine for pain, take it as prescribed. ? If you are not taking a prescription pain medicine, ask your doctor if you can take an over-the-counter medicine. · Keep your bandage clean and dry. Change the bandage whenever it gets wet or dirty, or at least one time a day. · If the abscess was packed with gauze:  ? Keep follow-up appointments to have the gauze changed or removed. If the doctor instructed you to remove the gauze, follow the instructions you were given for how to remove it. ? After the gauze is removed, soak the area in warm water for 15 to 20 minutes 2 times a day, until the wound closes. When should you call for help?   Call your doctor now or seek immediate medical care if:    · You have signs of worsening infection, such as:  ? Increased pain, swelling, warmth, or redness. ? Red streaks leading from the infected skin. ? Pus draining from the wound. ? A fever.    Watch closely for changes in your health, and be sure to contact your doctor if:    · You do not get better as expected. Where can you learn more? Go to http://coco-christopher.info/. Enter Y824 in the search box to learn more about \"Skin Abscess: Care Instructions. \"  Current as of: April 1, 2019  Content Version: 12.1  © 0243-6305 [employer]. Care instructions adapted under license by [employer] (which disclaims liability or warranty for this information). If you have questions about a medical condition or this instruction, always ask your healthcare professional. [address] any warranty or liability for your use of this information.

## 2019-09-30 ENCOUNTER — OFFICE VISIT (OUTPATIENT)
Dept: FAMILY MEDICINE CLINIC | Age: 49
End: 2019-09-30

## 2019-09-30 ENCOUNTER — HOSPITAL ENCOUNTER (OUTPATIENT)
Dept: GENERAL RADIOLOGY | Age: 49
Discharge: HOME | End: 2019-09-30
Payer: COMMERCIAL

## 2019-09-30 VITALS
TEMPERATURE: 98.3 F | OXYGEN SATURATION: 98 % | BODY MASS INDEX: 43.38 KG/M2 | RESPIRATION RATE: 16 BRPM | WEIGHT: 303 LBS | SYSTOLIC BLOOD PRESSURE: 122 MMHG | HEART RATE: 86 BPM | DIASTOLIC BLOOD PRESSURE: 80 MMHG | HEIGHT: 70 IN

## 2019-09-30 DIAGNOSIS — E11.65 UNCONTROLLED TYPE 2 DIABETES MELLITUS WITH HYPERGLYCEMIA (HCC): ICD-10-CM

## 2019-09-30 DIAGNOSIS — E29.1 HYPOGONADISM MALE: ICD-10-CM

## 2019-09-30 DIAGNOSIS — M54.41 BILATERAL LOW BACK PAIN WITH BILATERAL SCIATICA, UNSPECIFIED CHRONICITY: ICD-10-CM

## 2019-09-30 DIAGNOSIS — M54.42 BILATERAL LOW BACK PAIN WITH BILATERAL SCIATICA, UNSPECIFIED CHRONICITY: ICD-10-CM

## 2019-09-30 DIAGNOSIS — L02.416 ABSCESS OF LEFT LEG: Primary | ICD-10-CM

## 2019-09-30 PROCEDURE — 72114 X-RAY EXAM L-S SPINE BENDING: CPT

## 2019-09-30 NOTE — PATIENT INSTRUCTIONS
Learning About Diabetes Food Guidelines  Your Care Instructions    Meal planning is important to manage diabetes. It helps keep your blood sugar at a target level (which you set with your doctor). You don't have to eat special foods. You can eat what your family eats, including sweets once in a while. But you do have to pay attention to how often you eat and how much you eat of certain foods. You may want to work with a dietitian or a certified diabetes educator (CDE) to help you plan meals and snacks. A dietitian or CDE can also help you lose weight if that is one of your goals. What should you know about eating carbs? Managing the amount of carbohydrate (carbs) you eat is an important part of healthy meals when you have diabetes. Carbohydrate is found in many foods. · Learn which foods have carbs. And learn the amounts of carbs in different foods. ? Bread, cereal, pasta, and rice have about 15 grams of carbs in a serving. A serving is 1 slice of bread (1 ounce), ½ cup of cooked cereal, or 1/3 cup of cooked pasta or rice. ? Fruits have 15 grams of carbs in a serving. A serving is 1 small fresh fruit, such as an apple or orange; ½ of a banana; ½ cup of cooked or canned fruit; ½ cup of fruit juice; 1 cup of melon or raspberries; or 2 tablespoons of dried fruit. ? Milk and no-sugar-added yogurt have 15 grams of carbs in a serving. A serving is 1 cup of milk or 2/3 cup of no-sugar-added yogurt. ? Starchy vegetables have 15 grams of carbs in a serving. A serving is ½ cup of mashed potatoes or sweet potato; 1 cup winter squash; ½ of a small baked potato; ½ cup of cooked beans; or ½ cup cooked corn or green peas. · Learn how much carbs to eat each day and at each meal. A dietitian or CDE can teach you how to keep track of the amount of carbs you eat. This is called carbohydrate counting. · If you are not sure how to count carbohydrate grams, use the Plate Method to plan meals.  It is a good, quick way to make sure that you have a balanced meal. It also helps you spread carbs throughout the day. ? Divide your plate by types of foods. Put non-starchy vegetables on half the plate, meat or other protein food on one-quarter of the plate, and a grain or starchy vegetable in the final quarter of the plate. To this you can add a small piece of fruit and 1 cup of milk or yogurt, depending on how many carbs you are supposed to eat at a meal.  · Try to eat about the same amount of carbs at each meal. Do not \"save up\" your daily allowance of carbs to eat at one meal.  · Proteins have very little or no carbs per serving. Examples of proteins are beef, chicken, turkey, fish, eggs, tofu, cheese, cottage cheese, and peanut butter. A serving size of meat is 3 ounces, which is about the size of a deck of cards. Examples of meat substitute serving sizes (equal to 1 ounce of meat) are 1/4 cup of cottage cheese, 1 egg, 1 tablespoon of peanut butter, and ½ cup of tofu. How can you eat out and still eat healthy? · Learn to estimate the serving sizes of foods that have carbohydrate. If you measure food at home, it will be easier to estimate the amount in a serving of restaurant food. · If the meal you order has too much carbohydrate (such as potatoes, corn, or baked beans), ask to have a low-carbohydrate food instead. Ask for a salad or green vegetables. · If you use insulin, check your blood sugar before and after eating out to help you plan how much to eat in the future. · If you eat more carbohydrate at a meal than you had planned, take a walk or do other exercise. This will help lower your blood sugar. What else should you know? · Limit saturated fat, such as the fat from meat and dairy products. This is a healthy choice because people who have diabetes are at higher risk of heart disease. So choose lean cuts of meat and nonfat or low-fat dairy products.  Use olive or canola oil instead of butter or shortening when cooking. · Don't skip meals. Your blood sugar may drop too low if you skip meals and take insulin or certain medicines for diabetes. · Check with your doctor before you drink alcohol. Alcohol can cause your blood sugar to drop too low. Alcohol can also cause a bad reaction if you take certain diabetes medicines. Follow-up care is a key part of your treatment and safety. Be sure to make and go to all appointments, and call your doctor if you are having problems. It's also a good idea to know your test results and keep a list of the medicines you take. Where can you learn more? Go to http://coco-christopher.info/. Enter U179 in the search box to learn more about \"Learning About Diabetes Food Guidelines. \"  Current as of: April 16, 2019  Content Version: 12.2  © 3231-7372 [employer]. Care instructions adapted under license by [employer] (which disclaims liability or warranty for this information). If you have questions about a medical condition or this instruction, always ask your healthcare professional. [address] any warranty or liability for your use of this information. Learning About Meal Planning for Diabetes  Why plan your meals? Meal planning can be a key part of managing diabetes. Planning meals and snacks with the right balance of carbohydrate, protein, and fat can help you keep your blood sugar at the target level you set with your doctor. You don't have to eat special foods. You can eat what your family eats, including sweets once in a while. But you do have to pay attention to how often you eat and how much you eat of certain foods. You may want to work with a dietitian or a certified diabetes educator. He or she can give you tips and meal ideas and can answer your questions about meal planning. This health professional can also help you reach a healthy weight if that is one of your goals. What plan is right for you?   Your dietitian or diabetes educator may suggest that you start with the plate format or carbohydrate counting. The plate format  The plate format is a simple way to help you manage how you eat. You plan meals by learning how much space each food should take on a plate. Using the plate format helps you spread carbohydrate throughout the day. It can make it easier to keep your blood sugar level within your target range. It also helps you see if you're eating healthy portion sizes. To use the plate format, you put non-starchy vegetables on half your plate. Add meat or meat substitutes on one-quarter of the plate. Put a grain or starchy vegetable (such as brown rice or a potato) on the final quarter of the plate. You can add a small piece of fruit and some low-fat or fat-free milk or yogurt, depending on your carbohydrate goal for each meal.  Here are some tips for using the plate format:  · Make sure that you are not using an oversized plate. A 9-inch plate is best. Many restaurants use larger plates. · Get used to using the plate format at home. Then you can use it when you eat out. · Write down your questions about using the plate format. Talk to your doctor, a dietitian, or a diabetes educator about your concerns. Carbohydrate counting  With carbohydrate counting, you plan meals based on the amount of carbohydrate in each food. Carbohydrate raises blood sugar higher and more quickly than any other nutrient. It is found in desserts, breads and cereals, and fruit. It's also found in starchy vegetables such as potatoes and corn, grains such as rice and pasta, and milk and yogurt. Spreading carbohydrate throughout the day helps keep your blood sugar levels within your target range. Your daily amount depends on several things, including your weight, how active you are, which diabetes medicines you take, and what your goals are for your blood sugar levels.  A registered dietitian or diabetes educator can help you plan how much carbohydrate to include in each meal and snack. A guideline for your daily amount of carbohydrate is:  · 45 to 60 grams at each meal. That's about the same as 3 to 4 carbohydrate servings. · 15 to 20 grams at each snack. That's about the same as 1 carbohydrate serving. The Nutrition Facts label on packaged foods tells you how much carbohydrate is in a serving of the food. First, look at the serving size on the food label. Is that the amount you eat in a serving? All of the nutrition information on a food label is based on that serving size. So if you eat more or less than that, you'll need to adjust the other numbers. Total carbohydrate is the next thing you need to look for on the label. If you count carbohydrate servings, one serving of carbohydrate is 15 grams. For foods that don't come with labels, such as fresh fruits and vegetables, you'll need a guide that lists carbohydrate in these foods. Ask your doctor, dietitian, or diabetes educator about books or other nutrition guides you can use. If you take insulin, you need to know how many grams of carbohydrate are in a meal. This lets you know how much rapid-acting insulin to take before you eat. If you use an insulin pump, you get a constant rate of insulin during the day. So the pump must be programmed at meals to give you extra insulin to cover the rise in blood sugar after meals. When you know how much carbohydrate you will eat, you can take the right amount of insulin. Or, if you always use the same amount of insulin, you need to make sure that you eat the same amount of carbohydrate at meals. If you need more help to understand carbohydrate counting and food labels, ask your doctor, dietitian, or diabetes educator. How do you get started with meal planning? Here are some tips to get started:  · Plan your meals a week at a time. Don't forget to include snacks too. · Use cookbooks or online recipes to plan several main meals.  Plan some quick meals for busy nights. You also can double some recipes that freeze well. Then you can save half for other busy nights when you don't have time to cook. · Make sure you have the ingredients you need for your recipes. If you're running low on basic items, put these items on your shopping list too. · List foods that you use to make breakfasts, lunches, and snacks. List plenty of fruits and vegetables. · Post this list on the refrigerator. Add to it as you think of more things you need. · Take the list to the store to do your weekly shopping. Follow-up care is a key part of your treatment and safety. Be sure to make and go to all appointments, and call your doctor if you are having problems. It's also a good idea to know your test results and keep a list of the medicines you take. Where can you learn more? Go to http://coco-christopher.info/. Michael Luther in the search box to learn more about \"Learning About Meal Planning for Diabetes. \"  Current as of: April 16, 2019  Content Version: 12.2  © 5124-3586 [employer]. Care instructions adapted under license by [employer] (which disclaims liability or warranty for this information). If you have questions about a medical condition or this instruction, always ask your healthcare professional. [address] any warranty or liability for your use of this information.

## 2019-09-30 NOTE — PROGRESS NOTES
Jesus Matthews presents today for   Chief Complaint   Patient presents with    Skin Problem       Is someone accompanying this pt? no    Is the patient using any DME equipment during OV? no    Depression Screening:  3 most recent PHQ Screens 1/28/2019   Little interest or pleasure in doing things More than half the days   Feeling down, depressed, irritable, or hopeless Several days   Total Score PHQ 2 3   Trouble falling or staying asleep, or sleeping too much Not at all   Feeling tired or having little energy More than half the days   Poor appetite, weight loss, or overeating More than half the days   Feeling bad about yourself - or that you are a failure or have let yourself or your family down Nearly every day   Trouble concentrating on things such as school, work, reading, or watching TV Several days   Moving or speaking so slowly that other people could have noticed; or the opposite being so fidgety that others notice Several days   Thoughts of being better off dead, or hurting yourself in some way More than half the days   PHQ 9 Score 14   How difficult have these problems made it for you to do your work, take care of your home and get along with others Very difficult       Learning Assessment:  Learning Assessment 1/28/2019   PRIMARY LEARNER Patient   HIGHEST LEVEL OF EDUCATION - PRIMARY LEARNER  2 YEARS OF COLLEGE   BARRIERS PRIMARY LEARNER NONE   CO-LEARNER CAREGIVER No   PRIMARY LANGUAGE ENGLISH   LEARNER PREFERENCE PRIMARY LISTENING   ANSWERED BY self   RELATIONSHIP SELF       Abuse Screening:  No flowsheet data found. Fall Risk  No flowsheet data found. Health Maintenance reviewed and discussed and ordered per Provider. Health Maintenance Due   Topic Date Due    Pneumococcal 0-64 years (1 of 1 - PPSV23) 01[birth year]/1977    EYE EXAM RETINAL OR DILATED  01/13/1981    Influenza Age 5 to Adult  08/01/2019           Coordination of Care:  1.  Have you been to the ER, urgent care clinic since your last visit? Hospitalized since your last visit? no    2. Have you seen or consulted any other health care providers outside of the [address] [place] [address] since your last visit? Include any pap smears or colon screening.  no

## 2019-09-30 NOTE — PROGRESS NOTES
HISTORY OF PRESENT ILLNESS  Lynn Sanchez is a 51 y.o. male. Patient presents today to follow up on left lower leg abscess. Patient states pain has resolved. Comments area is itchy. Denies drainage. Request to have FMLA forms completed to cover provider visits and if he needs to take time off related to chronic bilateral knee pain and pain in feet. Comments he has pain in right shin radiating up his leg. States symptoms have been present for the last couple of weeks intermittently. Reports he if sits his legs will begin to feel numb. Admits to lower back pain when symptoms are present. States he has intermittent pain shooting his feet. Denies numbness/tingling. Requesting appt with another endocrinologist due to endocrinologist he was referred to couldn't get him in until January. Allergies   Allergen Reactions    Pcn [Penicillins] Other (comments)     Unknown was told when a child     Current Outpatient Medications   Medication Sig Dispense Refill    sildenafil citrate (VIAGRA) 50 mg tablet Take 1 Tab by mouth as needed (45 mins prior to intercourse). 5 Tab 0    lisinopril-hydroCHLOROthiazide (PRINZIDE, ZESTORETIC) 20-25 mg per tablet Take 1 Tab by mouth daily. 90 Tab 1    insulin glargine (LANTUS U-100 INSULIN) 100 unit/mL injection 100 Units by SubCUTAneous route every evening. 3 Vial 2    metFORMIN (GLUCOPHAGE) 1,000 mg tablet Take 1 Tab by mouth two (2) times daily (with meals). 60 Tab 3    atorvastatin (LIPITOR) 80 mg tablet Take 1 Tab by mouth daily. 90 Tab 1    empagliflozin (JARDIANCE) 25 mg tablet Take 1 Tab by mouth daily. 90 Tab 1    flash glucose scanning reader (FREESTYLE GERALD 14 DAY READER) [facility] Use to monitor blood sugar at least 3 times daily 1 Each 0    flash glucose sensor (FREESTYLE GERALD 14 DAY SENSOR) kit Apply new sensor every 14 days to monitor blood sugar as directed. Please dispense 2 sensors.  1 Kit 11    carvedilol (COREG) 25 mg tablet Take 25 mg by mouth two (2) times daily (with meals).  multivitamin (ONE A DAY) tablet Take 1 Tab by mouth daily.        Past Medical History:   Diagnosis Date    Diabetes ([address]     Hypercholesterolemia     Hypertension      Social History     Socioeconomic History    Marital status:      Spouse name: Not on file    Number of children: Not on file    Years of education: Not on file    Highest education level: Not on file   Occupational History    Not on file   Social Needs    Financial resource strain: Not on file    Food insecurity:     Worry: Not on file     Inability: Not on file    Transportation needs:     Medical: Not on file     Non-medical: Not on file   Tobacco Use    Smoking status: Never Smoker    Smokeless tobacco: Never Used   Substance and Sexual Activity    Alcohol use: Yes     Comment: Occassional use    Drug use: Not on file     Comment: Occassional use    Sexual activity: Yes     Partners: Female     Birth control/protection: None   Lifestyle    Physical activity:     Days per week: Not on file     Minutes per session: Not on file    Stress: Not on file   Relationships    Social connections:     Talks on phone: Not on file     Gets together: Not on file     Attends [employer] service: Not on file     Active member of club or organization: Not on file     Attends meetings of clubs or organizations: Not on file     Relationship status: Not on file    Intimate partner violence:     Fear of current or ex partner: Not on file     Emotionally abused: Not on file     Physically abused: Not on file     Forced sexual activity: Not on file   Other Topics Concern    Not on file   Social History Narrative    Not on file     Wt Readings from Last 3 Encounters:   09/16/19 300 lb (136.1 kg)   08/27/19 299 lb (135.6 kg)   06/27/19 301 lb 9.6 oz (136.8 kg)     BP Readings from Last 3 Encounters:   09/16/19 115/76   08/27/19 142/80   06/27/19 128/74     Review of Systems   Musculoskeletal: Positive for back pain (lower). Skin:        Tender mass to left anterior lower leg     /80   Pulse 86   Temp 98.3 °F (36.8 °C) (Oral)   Resp 16   Ht 5' 10\" (1.778 m)   Wt 303 lb (137.4 kg)   SpO2 98%   BMI 43.48 kg/m²      Physical Exam   Constitutional: He is oriented to person, place, and time. He appears well-developed and well-nourished. HENT:   Head: Normocephalic and atraumatic. Neck: Normal range of motion. Neck supple. Cardiovascular: Normal rate, regular rhythm and normal heart sounds. Exam reveals no gallop and no friction rub. No murmur heard. Pulmonary/Chest: Effort normal and breath sounds normal. He has no wheezes. He has no rhonchi. He has no rales. Musculoskeletal: He exhibits no edema. Lumbar back: He exhibits tenderness (lumbar paraveterbal muscles). Neurological: He is alert and oriented to person, place, and time. Skin: Skin is warm and dry. Small resolving abscess to left anterior shin with scarring present. Decreasing in size. ASSESSMENT and PLAN    ICD-10-CM ICD-9-CM    1. Abscess of left leg L02.416 682.6    2. Uncontrolled type 2 diabetes mellitus with hyperglycemia (HCC) E11.65 250.02 REFERRAL TO ENDOCRINOLOGY   3. Hypogonadism male E29.1 257.2 REFERRAL TO ENDOCRINOLOGY   4. Bilateral low back pain with bilateral sciatica, unspecified chronicity M54.42 724.3 XR SPINE LUMB COMP W BEND    M54.41 724.2      Orders Placed This Encounter    XR SPINE LUMB COMP W BEND    REFERRAL TO ENDOCRINOLOGY     Anticipatory guidance for above provided and reviewed  I have discussed the diagnosis with the patient and the intended plan as seen in the above orders. The patient has received an after-visit summary and questions were answered concerning future plans. I have discussed medication side effects and warnings with the patient as well. Patient agreeable with above plan and verbalizes understanding.     Follow-up and Dispositions    · Return in about 2 months (around 11/30/2019) for DM/HTN.

## 2019-10-08 ENCOUNTER — TELEPHONE (OUTPATIENT)
Dept: FAMILY MEDICINE CLINIC | Age: 49
End: 2019-10-08

## 2019-10-17 RX ORDER — SILDENAFIL 50 MG/1
50 TABLET, FILM COATED ORAL AS NEEDED
Qty: 5 TAB | Refills: 0 | Status: SHIPPED | OUTPATIENT
Start: 2019-10-17 | End: 2020-01-28 | Stop reason: SDUPTHER

## 2019-10-29 ENCOUNTER — HOSPITAL ENCOUNTER (OUTPATIENT)
Dept: LAB | Age: 49
Discharge: HOME | End: 2019-10-29
Payer: COMMERCIAL

## 2019-10-29 ENCOUNTER — OFFICE VISIT (OUTPATIENT)
Dept: FAMILY MEDICINE CLINIC | Age: 49
End: 2019-10-29

## 2019-10-29 VITALS
SYSTOLIC BLOOD PRESSURE: 115 MMHG | DIASTOLIC BLOOD PRESSURE: 77 MMHG | TEMPERATURE: 97.8 F | HEIGHT: 70 IN | HEART RATE: 91 BPM | BODY MASS INDEX: 42.66 KG/M2 | RESPIRATION RATE: 16 BRPM | OXYGEN SATURATION: 98 % | WEIGHT: 298 LBS

## 2019-10-29 DIAGNOSIS — E78.1 HYPERTRIGLYCERIDEMIA: ICD-10-CM

## 2019-10-29 DIAGNOSIS — I10 ESSENTIAL HYPERTENSION: ICD-10-CM

## 2019-10-29 DIAGNOSIS — G62.9 POLYNEUROPATHY: ICD-10-CM

## 2019-10-29 DIAGNOSIS — G89.29 CHRONIC BILATERAL LOW BACK PAIN WITH LEFT-SIDED SCIATICA: ICD-10-CM

## 2019-10-29 DIAGNOSIS — G89.29 CHRONIC PAIN OF LEFT KNEE: ICD-10-CM

## 2019-10-29 DIAGNOSIS — E11.65 TYPE 2 DIABETES MELLITUS WITH HYPERGLYCEMIA, UNSPECIFIED WHETHER LONG TERM INSULIN USE (HCC): Primary | ICD-10-CM

## 2019-10-29 DIAGNOSIS — E55.9 HYPOVITAMINOSIS D: ICD-10-CM

## 2019-10-29 DIAGNOSIS — M54.42 CHRONIC BILATERAL LOW BACK PAIN WITH LEFT-SIDED SCIATICA: ICD-10-CM

## 2019-10-29 DIAGNOSIS — E11.65 TYPE 2 DIABETES MELLITUS WITH HYPERGLYCEMIA, UNSPECIFIED WHETHER LONG TERM INSULIN USE (HCC): ICD-10-CM

## 2019-10-29 DIAGNOSIS — M25.562 CHRONIC PAIN OF LEFT KNEE: ICD-10-CM

## 2019-10-29 DIAGNOSIS — Z23 ENCOUNTER FOR IMMUNIZATION: ICD-10-CM

## 2019-10-29 LAB
ALBUMIN SERPL-MCNC: 4.3 G/DL (ref 3.4–5)
ALBUMIN/GLOB SERPL: 1.1 {RATIO} (ref 0.8–1.7)
ALP SERPL-CCNC: 72 U/L (ref 45–117)
ALT SERPL-CCNC: 44 U/L (ref 16–61)
ANION GAP SERPL CALC-SCNC: 7 MMOL/L (ref 3–18)
AST SERPL-CCNC: 28 U/L (ref 10–38)
BILIRUB SERPL-MCNC: 0.6 MG/DL (ref 0.2–1)
BUN SERPL-MCNC: 38 MG/DL (ref 7–18)
BUN/CREAT SERPL: 20 (ref 12–20)
CALCIUM SERPL-MCNC: 9.7 MG/DL (ref 8.5–10.1)
CHLORIDE SERPL-SCNC: 101 MMOL/L (ref 100–111)
CO2 SERPL-SCNC: 28 MMOL/L (ref 21–32)
CREAT SERPL-MCNC: 1.86 MG/DL (ref 0.6–1.3)
GLOBULIN SER CALC-MCNC: 3.9 G/DL (ref 2–4)
GLUCOSE SERPL-MCNC: 163 MG/DL (ref 74–99)
HBA1C MFR BLD HPLC: 9.5 %
POTASSIUM SERPL-SCNC: 4.7 MMOL/L (ref 3.5–5.5)
PROT SERPL-MCNC: 8.2 G/DL (ref 6.4–8.2)
SODIUM SERPL-SCNC: 136 MMOL/L (ref 136–145)

## 2019-10-29 PROCEDURE — 82043 UR ALBUMIN QUANTITATIVE: CPT

## 2019-10-29 PROCEDURE — 82306 VITAMIN D 25 HYDROXY: CPT

## 2019-10-29 PROCEDURE — 80053 COMPREHEN METABOLIC PANEL: CPT

## 2019-10-29 PROCEDURE — 80061 LIPID PANEL: CPT

## 2019-10-29 RX ORDER — METFORMIN HYDROCHLORIDE 1000 MG/1
1000 TABLET ORAL 2 TIMES DAILY WITH MEALS
Qty: 60 TAB | Refills: 3 | Status: SHIPPED | OUTPATIENT
Start: 2019-10-29 | End: 2020-01-28 | Stop reason: SDUPTHER

## 2019-10-29 RX ORDER — ATORVASTATIN CALCIUM 80 MG/1
80 TABLET, FILM COATED ORAL DAILY
Qty: 90 TAB | Refills: 1 | Status: SHIPPED | OUTPATIENT
Start: 2019-10-29 | End: 2020-05-11 | Stop reason: SDUPTHER

## 2019-10-29 NOTE — PROGRESS NOTES
Pernell Cornelius presents today for   Chief Complaint   Patient presents with    Diabetes       Is someone accompanying this pt? no    Is the patient using any DME equipment during OV? no    Depression Screening:  3 most recent PHQ Screens 1/28/2019   Little interest or pleasure in doing things More than half the days   Feeling down, depressed, irritable, or hopeless Several days   Total Score PHQ 2 3   Trouble falling or staying asleep, or sleeping too much Not at all   Feeling tired or having little energy More than half the days   Poor appetite, weight loss, or overeating More than half the days   Feeling bad about yourself - or that you are a failure or have let yourself or your family down Nearly every day   Trouble concentrating on things such as school, work, reading, or watching TV Several days   Moving or speaking so slowly that other people could have noticed; or the opposite being so fidgety that others notice Several days   Thoughts of being better off dead, or hurting yourself in some way More than half the days   PHQ 9 Score 14   How difficult have these problems made it for you to do your work, take care of your home and get along with others Very difficult       Learning Assessment:  Learning Assessment 1/28/2019   PRIMARY LEARNER Patient   HIGHEST LEVEL OF EDUCATION - PRIMARY LEARNER  2 YEARS OF COLLEGE   BARRIERS PRIMARY LEARNER NONE   CO-LEARNER CAREGIVER No   PRIMARY LANGUAGE ENGLISH   LEARNER PREFERENCE PRIMARY LISTENING   ANSWERED BY self   RELATIONSHIP SELF       Abuse Screening:  No flowsheet data found. Fall Risk  No flowsheet data found. Health Maintenance reviewed and discussed and ordered per Provider. Health Maintenance Due   Topic Date Due    Pneumococcal 0-64 years (1 of 1 - PPSV23) 01[birth year]/1977    EYE EXAM RETINAL OR DILATED  01/13/1981    Influenza Age 5 to Adult  08/01/2019           Coordination of Care:  1.  Have you been to the ER, urgent care clinic since your last visit? Hospitalized since your last visit? no    2. Have you seen or consulted any other health care providers outside of the [address] [place] [address] since your last visit? Include any pap smears or colon screening.  no

## 2019-10-29 NOTE — PATIENT INSTRUCTIONS
Vaccine Information Statement    Influenza (Flu) Vaccine (Inactivated or Recombinant): What You Need to Know    Many Vaccine Information Statements are available in Yoruba and other languages. See www.immunize.org/vis  Hojas de información sobre vacunas están disponibles en español y en muchos otros idiomas. Visite www.immunize.org/vis    1. Why get vaccinated? Influenza vaccine can prevent influenza (flu). Flu is a contagious disease that spreads around the [facility] every year, usually between October and May. Anyone can get the flu, but it is more dangerous for some people. Infants and young children, people 72years of age and older, pregnant women, and people with certain health conditions or a weakened immune system are at greatest risk of flu complications. Pneumonia, bronchitis, sinus infections and ear infections are examples of flu-related complications. If you have a medical condition, such as heart disease, cancer or diabetes, flu can make it worse. Flu can cause fever and chills, sore throat, muscle aches, fatigue, cough, headache, and runny or stuffy nose. Some people may have vomiting and diarrhea, though this is more common in children than adults. Each year thousands of people in the [facility] die from flu, and many more are hospitalized. Flu vaccine prevents millions of illnesses and flu-related visits to the doctor each year. 2. Influenza vaccines     CDC recommends everyone 10months of age and older get vaccinated every flu season. Children 6 months through 6years of age may need 2 doses during a single flu season. Everyone else needs only 1 dose each flu season. It takes about 2 weeks for protection to develop after vaccination. There are many flu viruses, and they are always changing. Each year a new flu vaccine is made to protect against three or four viruses that are likely to cause disease in the upcoming flu season.  Even when the vaccine doesnt exactly match these viruses, it may still provide some protection. Influenza vaccine does not cause flu. Influenza vaccine may be given at the same time as other vaccines. 3. Talk with your health care provider    Tell your vaccine provider if the person getting the vaccine:   Has had an allergic reaction after a previous dose of influenza vaccine, or has any severe, life-threatening allergies.  Has ever had Guillain-Barré Syndrome (also called GBS). In some cases, your health care provider may decide to postpone influenza vaccination to a future visit. People with minor illnesses, such as a cold, may be vaccinated. People who are moderately or severely ill should usually wait until they recover before getting influenza vaccine. Your health care provider can give you more information. 4. Risks of a reaction     Soreness, redness, and swelling where shot is given, fever, muscle aches, and headache can happen after influenza vaccine.  There may be a very small increased risk of Guillain-Barré Syndrome (GBS) after inactivated influenza vaccine (the flu shot). formerly [facility] children who get the flu shot along with pneumococcal vaccine (PCV13), and/or DTaP vaccine at the same time might be slightly more likely to have a seizure caused by fever. Tell your health care provider if a child who is getting flu vaccine has ever had a seizure. People sometimes faint after medical procedures, including vaccination. Tell your provider if you feel dizzy or have vision changes or ringing in the ears. As with any medicine, there is a very remote chance of a vaccine causing a severe allergic reaction, other serious injury, or death. 5. What if there is a serious problem? An allergic reaction could occur after the vaccinated person leaves the clinic.  If you see signs of a severe allergic reaction (hives, swelling of the face and throat, difficulty breathing, a fast heartbeat, dizziness, or weakness), call 9-1-1 and get the person to the nearest hospital.    For other signs that concern you, call your health care provider. Adverse reactions should be reported to the Vaccine Adverse Event Reporting System (VAERS). Your health care provider will usually file this report, or you can do it yourself. Visit the VAERS website at www.vaers. [facility].gov or call 1-618.145.2309. VAERS is only for reporting reactions, and VAERS staff do not give medical advice. 6. The National Vaccine Injury Compensation Program    The formerly [facility] Vaccine Injury Compensation Program (VICP) is a federal program that was created to compensate people who may have been injured by certain vaccines. Visit the VICP website at www.[facility].gov/vaccinecompensation or call 0-361.795.8648 to learn about the program and about filing a claim. There is a time limit to file a claim for compensation. 7. How can I learn more?  Ask your health care provider.  Call your local or state health department.  Contact the Centers for Disease Control and Prevention (CDC):  - Call 0-174.400.7653 (4-372-RCZ-INFO) or  - Visit CDCs influenza website at www.cdc.gov/flu    Vaccine Information Statement (Interim)  Inactivated Influenza Vaccine   8/15/2019  42 CARRILLO Aburto [id number]-03   Department of Health and Human Services  Centers for Disease Control and Prevention    Office Use Only         Low Back Pain: Exercises  Introduction  Here are some examples of exercises for you to try. The exercises may be suggested for a condition or for rehabilitation. Start each exercise slowly. Ease off the exercises if you start to have pain. You will be told when to start these exercises and which ones will work best for you. How to do the exercises  Press-up    1. Lie on your stomach, supporting your body with your forearms. 2. Press your elbows down into the floor to raise your upper back.  As you do this, relax your stomach muscles and allow your back to arch without using your back muscles. As your press up, do not let your hips or pelvis come off the floor. 3. Hold for 15 to 30 seconds, then relax. 4. Repeat 2 to 4 times. Alternate arm and leg (bird dog) exercise    1. Start on the floor, on your hands and knees. 2. Tighten your belly muscles. 3. Raise one leg off the floor, and hold it straight out behind you. Be careful not to let your hip drop down, because that will twist your trunk. 4. Hold for about 6 seconds, then lower your leg and switch to the other leg. 5. Repeat 8 to 12 times on each leg. 6. Over time, work up to holding for 10 to 30 seconds each time. 7. If you feel stable and secure with your leg raised, try raising the opposite arm straight out in front of you at the same time. Knee-to-chest exercise    1. Lie on your back with your knees bent and your feet flat on the floor. 2. Bring one knee to your chest, keeping the other foot flat on the floor (or keeping the other leg straight, whichever feels better on your lower back). 3. Keep your lower back pressed to the floor. Hold for at least 15 to 30 seconds. 4. Relax, and lower the knee to the starting position. 5. Repeat with the other leg. Repeat 2 to 4 times with each leg. 6. To get more stretch, put your other leg flat on the floor while pulling your knee to your chest.    Curl-ups    1. Lie on the floor on your back with your knees bent at a 90-degree angle. Your feet should be flat on the floor, about 12 inches from your buttocks. 2. Cross your arms over your chest. If this bothers your neck, try putting your hands behind your neck (not your head), with your elbows spread apart. 3. Slowly tighten your belly muscles and raise your shoulder blades off the floor. 4. Keep your head in line with your body, and do not press your chin to your chest.  5. Hold this position for 1 or 2 seconds, then slowly lower yourself back down to the floor. 6. Repeat 8 to 12 times. Pelvic tilt exercise    1.  Lie on your back with your knees bent. 2. \"Brace\" your stomach. This means to tighten your muscles by pulling in and imagining your belly button moving toward your spine. You should feel like your back is pressing to the floor and your hips and pelvis are rocking back. 3. Hold for about 6 seconds while you breathe smoothly. 4. Repeat 8 to 12 times. Heel dig bridging    1. Lie on your back with both knees bent and your ankles bent so that only your heels are digging into the floor. Your knees should be bent about 90 degrees. 2. Then push your heels into the floor, squeeze your buttocks, and lift your hips off the floor until your shoulders, hips, and knees are all in a straight line. 3. Hold for about 6 seconds as you continue to breathe normally, and then slowly lower your hips back down to the floor and rest for up to 10 seconds. 4. Do 8 to 12 repetitions. Hamstring stretch in doorway    1. Lie on your back in a doorway, with one leg through the open door. 2. Slide your leg up the wall to straighten your knee. You should feel a gentle stretch down the back of your leg. 3. Hold the stretch for at least 15 to 30 seconds. Do not arch your back, point your toes, or bend either knee. Keep one heel touching the floor and the other heel touching the wall. 4. Repeat with your other leg. 5. Do 2 to 4 times for each leg. Hip flexor stretch    1. Kneel on the floor with one knee bent and one leg behind you. Place your forward knee over your foot. Keep your other knee touching the floor. 2. Slowly push your hips forward until you feel a stretch in the upper thigh of your rear leg. 3. Hold the stretch for at least 15 to 30 seconds. Repeat with your other leg. 4. Do 2 to 4 times on each side. Wall sit    1. Stand with your back 10 to 12 inches away from a wall. 2. Lean into the wall until your back is flat against it.   3. Slowly slide down until your knees are slightly bent, pressing your lower back into the wall.  4. Hold for about 6 seconds, then slide back up the wall. 5. Repeat 8 to 12 times. Follow-up care is a key part of your treatment and safety. Be sure to make and go to all appointments, and call your doctor if you are having problems. It's also a good idea to know your test results and keep a list of the medicines you take. Where can you learn more? Go to http://coco-christopher.info/. Enter T419 in the search box to learn more about \"Low Back Pain: Exercises. \"  Current as of: June 26, 2019  Content Version: 12.2  © 5599-0425 [employer]. Care instructions adapted under license by [employer] (which disclaims liability or warranty for this information). If you have questions about a medical condition or this instruction, always ask your healthcare professional. [address] any warranty or liability for your use of this information.

## 2019-10-29 NOTE — PROGRESS NOTES
Subjective:    Michelle Rosario is a 51y.o. year old male seen for follow up of diabetes. He also has hypertension and hyperlipidemia. Diabetic Review of Systems - medication compliance: compliant all of the time, diabetic diet compliance: compliant most of the time, home glucose monitoring: is performed regularly, fasting values range 100s, nonfasting values range low-mid 200s, further diabetic ROS: no polyuria or polydipsia, no chest pain, dyspnea or TIA's, no numbness, tingling or pain in extremities. Other symptoms and concerns: patient states he continues to have persistent left knee pain that has been present for the last several years. Comments pain is worse after sitting and getting up. Denies swelling. States he has taken mobic in the past with improvement. Patient states he has his initial appt with endocrinology for low testosterone with Dr. Everett Garsia this week. Patient Active Problem List   Diagnosis Code    Obesity, morbid ([facility] E66.01     Current Outpatient Medications   Medication Sig Dispense Refill    empagliflozin (JARDIANCE) 25 mg tablet Take 1 Tab by mouth daily. 90 Tab 1    sildenafil citrate (VIAGRA) 50 mg tablet Take 1 Tab by mouth as needed (45 mins prior to intercourse). 5 Tab 0    lisinopril-hydroCHLOROthiazide (PRINZIDE, ZESTORETIC) 20-25 mg per tablet Take 1 Tab by mouth daily. 90 Tab 1    insulin glargine (LANTUS U-100 INSULIN) 100 unit/mL injection 100 Units by SubCUTAneous route every evening. (Patient taking differently: 90 Units by SubCUTAneous route every evening.) 3 Vial 2    metFORMIN (GLUCOPHAGE) 1,000 mg tablet Take 1 Tab by mouth two (2) times daily (with meals). 60 Tab 3    atorvastatin (LIPITOR) 80 mg tablet Take 1 Tab by mouth daily.  90 Tab 1    flash glucose scanning reader (FREESTYLE GERALD 14 DAY READER) [facility] Use to monitor blood sugar at least 3 times daily 1 Each 0    flash glucose sensor (FREESTYLE GERALD 14 DAY SENSOR) kit Apply new sensor every 14 days to monitor blood sugar as directed. Please dispense 2 sensors. 1 Kit 11    multivitamin (ONE A DAY) tablet Take 1 Tab by mouth daily. Allergies   Allergen Reactions    Pcn [Penicillins] Other (comments)     Unknown was told when a child     Past Surgical History:   Procedure Laterality Date    HX APPENDECTOMY       Family History   Problem Relation Age of Onset    Cancer Mother         unknown ? breast    Heart Disease Father     No Known Problems Sister     Cancer Brother     No Known Problems Sister     No Known Problems Brother     No Known Problems Brother     Diabetes Daughter     Diabetes Daughter       Lab Results   Component Value Date/Time    Cholesterol, total 173 06/27/2019 04:16 PM    HDL Cholesterol 37 (L) 06/27/2019 04:16 PM    LDL, calculated 91.6 06/27/2019 04:16 PM    VLDL, calculated 44.4 06/27/2019 04:16 PM    Triglyceride 222 (H) 06/27/2019 04:16 PM    CHOL/HDL Ratio 4.7 06/27/2019 04:16 PM     Lab Results   Component Value Date/Time    Sodium 132 (L) 08/28/2019 07:19 AM    Potassium 4.8 08/28/2019 07:19 AM    Chloride 94 (L) 08/28/2019 07:19 AM    CO2 33 (H) 08/28/2019 07:19 AM    Anion gap 5 08/28/2019 07:19 AM    Glucose 314 (H) 08/28/2019 07:19 AM    BUN 38 (H) 08/28/2019 07:19 AM    Creatinine 1.74 (H) 08/28/2019 07:19 AM    BUN/Creatinine ratio 22 (H) 08/28/2019 07:19 AM    GFR est AA 51 (L) 08/28/2019 07:19 AM    GFR est non-AA 42 (L) 08/28/2019 07:19 AM    Calcium 9.7 08/28/2019 07:19 AM    Bilirubin, total 0.4 08/28/2019 07:19 AM    AST (SGOT) 34 08/28/2019 07:19 AM    Alk.  phosphatase 93 08/28/2019 07:19 AM    Protein, total 7.6 08/28/2019 07:19 AM    Albumin 4.0 08/28/2019 07:19 AM    Globulin 3.6 08/28/2019 07:19 AM    A-G Ratio 1.1 08/28/2019 07:19 AM    ALT (SGPT) 47 08/28/2019 07:19 AM     Lab Results   Component Value Date/Time    WBC 5.2 08/28/2019 07:19 AM    HGB 14.0 08/28/2019 07:19 AM    HCT 44.1 08/28/2019 07:19 AM    PLATELET 353 47/71/7403 07:19 AM    MCV 84.6 08/28/2019 07:19 AM     Lab Results   Component Value Date/Time    Hemoglobin A1c 9.9 (H) 06/27/2019 04:16 PM    Hemoglobin A1c (POC) 10.4 08/27/2019 02:18 PM     Lab Results   Component Value Date/Time    Microalbumin/Creat ratio (mg/g creat) 207 (H) 06/27/2019 04:16 PM    Microalbumin,urine random 19.90 (H) 06/27/2019 04:16 PM     Wt Readings from Last 3 Encounters:   09/30/19 303 lb (137.4 kg)   09/16/19 300 lb (136.1 kg)   08/27/19 299 lb (135.6 kg)     Last Point of Care HGB A1C  Hemoglobin A1c (POC)   Date Value Ref Range Status   08/27/2019 10.4 % Final      BP Readings from Last 3 Encounters:   09/30/19 122/80   09/16/19 115/76   08/27/19 142/80       Results for orders placed or performed during the hospital encounter of 08/28/19   TESTOSTERONE, FREE & TOTAL   Result Value Ref Range    Testosterone 129 (L) 264 - 916 ng/dL    Free testosterone (Direct) 3.7 (L) 6.8 - 21.5 pg/mL   PROLACTIN   Result Value Ref Range    Prolactin 8.4 ng/mL   [facility] AND LH   Result Value Ref Range    FSH 6.0 mIU/mL    Luteinizing hormone 10.8 mIU/mL   TSH 3RD GENERATION   Result Value Ref Range    TSH 2.67 0.36 - 7.05 uIU/mL   METABOLIC PANEL, COMPREHENSIVE   Result Value Ref Range    Sodium 132 (L) 136 - 145 mmol/L    Potassium 4.8 3.5 - 5.5 mmol/L    Chloride 94 (L) 100 - 111 mmol/L    CO2 33 (H) 21 - 32 mmol/L    Anion gap 5 3.0 - 18 mmol/L    Glucose 314 (H) 74 - 99 mg/dL    BUN 38 (H) 7.0 - 18 MG/DL    Creatinine 1.74 (H) 0.6 - 1.3 MG/DL    BUN/Creatinine ratio 22 (H) 12 - 20      GFR est AA 51 (L) >60 ml/min/1.73m2    GFR est non-AA 42 (L) >60 ml/min/1.73m2    Calcium 9.7 8.5 - 10.1 MG/DL    Bilirubin, total 0.4 0.2 - 1.0 MG/DL    ALT (SGPT) 47 16 - 61 U/L    AST (SGOT) 34 10 - 38 U/L    Alk.  phosphatase 93 45 - 117 U/L    Protein, total 7.6 6.4 - 8.2 g/dL    Albumin 4.0 3.4 - 5.0 g/dL    Globulin 3.6 2.0 - 4.0 g/dL    A-G Ratio 1.1 0.8 - 1.7     CBC WITH AUTOMATED DIFF   Result Value Ref Range    WBC 5.2 4.6 - 13.2 K/uL    RBC 5.21 4.70 - 5.50 M/uL    HGB 14.0 13.0 - 16.0 g/dL    HCT 44.1 36.0 - 48.0 %    MCV 84.6 74.0 - 97.0 FL    MCH 26.9 24.0 - 34.0 PG    MCHC 31.7 31.0 - 37.0 g/dL    RDW 13.2 11.6 - 14.5 %    PLATELET 311 916 - 158 K/uL    MPV 11.6 9.2 - 11.8 FL    NEUTROPHILS 47 40 - 73 %    LYMPHOCYTES 44 21 - 52 %    MONOCYTES 8 3 - 10 %    EOSINOPHILS 1 0 - 5 %    BASOPHILS 0 0 - 2 %    ABS. NEUTROPHILS 2.5 1.8 - 8.0 K/UL    ABS. LYMPHOCYTES 2.3 0.9 - 3.6 K/UL    ABS. MONOCYTES 0.4 0.05 - 1.2 K/UL    ABS. EOSINOPHILS 0.1 0.0 - 0.4 K/UL    ABS. BASOPHILS 0.0 0.0 - 0.1 K/UL    DF AUTOMATED       Last Diabetic Foot Exam on: 1/28/19    Objective:  Visit Vitals  /77   Pulse 91   Temp 97.8 °F (36.6 °C) (Oral)   Resp 16   Ht 5' 10\" (1.778 m)   Wt 298 lb (135.2 kg)   SpO2 98%   BMI 42.76 kg/m²     Awake and alert in no acute distress   Neck supple without lymphadenopathy, no carotid artery bruits auscultated bilaterally. No thyromegaly   Lungs clear throughout   S1 S2 RRR without ectopy or murmur auscultated. Extremities: no clubbing, cyanosis, peripheral edema   Abdomen - soft, nontender, nondistended, no masses or organomegaly  Integumentary: no rashes   Musculoskeletal: Knee: pain to lateral left knee, edema or warmth present  Reviewed vital signs     Results for orders placed or performed in visit on 10/29/19   AMB POC HEMOGLOBIN A1C   Result Value Ref Range    Hemoglobin A1c (POC) 9.5 %       Assessment/Plan:    ICD-10-CM ICD-9-CM    1. Type 2 diabetes mellitus with hyperglycemia, unspecified whether long term insulin use (HCC) E11.65 250.00 metFORMIN (GLUCOPHAGE) 1,000 mg tablet      AMB POC HEMOGLOBIN A1C      LIPID PANEL      METABOLIC PANEL, COMPREHENSIVE      MICROALBUMIN, UR, RAND W/ MICROALB/CREAT RATIO   2. Encounter for immunization Z23 V03.89 INFLUENZA VIRUS VAC QUAD,SPLIT,PRESV FREE SYRINGE IM   3. Chronic pain of left knee M25.562 719.46 REFERRAL TO ORTHOPEDICS    G89.29 338.29    4. Essential hypertension I10 401.9 LIPID PANEL      METABOLIC PANEL, COMPREHENSIVE   5. Polyneuropathy G62.9 356.9    6. Hypertriglyceridemia E78.1 272.1 LIPID PANEL      METABOLIC PANEL, COMPREHENSIVE   7. Hypovitaminosis D E55.9 268.9 VITAMIN D, 25 HYDROXY   8. Chronic bilateral low back pain with left-sided sciatica M54.42 724.2     G89.29 724.3      338.29      Orders Placed This Encounter    Influenza virus vaccine (QUADRIVALENT PRES FREE SYRINGE) IM (02805)    LIPID PANEL    METABOLIC PANEL, COMPREHENSIVE    MICROALBUMIN, UR, RAND W/ MICROALB/CREAT RATIO    VITAMIN D, 25 HYDROXY    [facility]    AMB POC HEMOGLOBIN A1C    metFORMIN (GLUCOPHAGE) 1,000 mg tablet    atorvastatin (LIPITOR) 80 mg tablet    flash glucose scanning reader ([employer] GERALD 14 DAY READER) misc        Issues reviewed with him: low cholesterol diet, weight control and daily exercise discussed. I have discussed the diagnosis with the patient and the intended plan as seen in the above orders. The patient has received an after-visit summary and questions were answered concerning future plans. I have discussed medication side effects and warnings with the patient as well. Patient agreeable with above plan and verbalizes understanding. Follow-up and Dispositions    · Return in about 3 months (around 1/29/2020) for DM/HTN/HLD.

## 2019-10-30 LAB
25(OH)D3 SERPL-MCNC: 25.6 NG/ML (ref 30–100)
CHOLEST SERPL-MCNC: 212 MG/DL
CREAT UR-MCNC: 158 MG/DL (ref 30–125)
HDLC SERPL-MCNC: 36 MG/DL (ref 40–60)
HDLC SERPL: 5.9 {RATIO} (ref 0–5)
LDLC SERPL CALC-MCNC: 118.4 MG/DL (ref 0–100)
LIPID PROFILE,FLP: ABNORMAL
MICROALBUMIN UR-MCNC: 6.28 MG/DL (ref 0–3)
MICROALBUMIN/CREAT UR-RTO: 40 MG/G (ref 0–30)
TRIGL SERPL-MCNC: 288 MG/DL (ref ?–150)
VLDLC SERPL CALC-MCNC: 57.6 MG/DL

## 2019-11-11 ENCOUNTER — OFFICE VISIT (OUTPATIENT)
Dept: ORTHOPEDIC SURGERY | Facility: CLINIC | Age: 49
End: 2019-11-11

## 2019-11-11 VITALS
DIASTOLIC BLOOD PRESSURE: 60 MMHG | TEMPERATURE: 96.3 F | RESPIRATION RATE: 16 BRPM | HEIGHT: 70 IN | WEIGHT: 301 LBS | BODY MASS INDEX: 43.09 KG/M2 | OXYGEN SATURATION: 92 % | HEART RATE: 91 BPM | SYSTOLIC BLOOD PRESSURE: 100 MMHG

## 2019-11-11 DIAGNOSIS — M25.562 CHRONIC PAIN OF LEFT KNEE: ICD-10-CM

## 2019-11-11 DIAGNOSIS — M76.52 PATELLAR TENDINITIS OF LEFT KNEE: Primary | ICD-10-CM

## 2019-11-11 DIAGNOSIS — G89.29 CHRONIC PAIN OF LEFT KNEE: ICD-10-CM

## 2019-11-11 DIAGNOSIS — M17.12 PRIMARY OSTEOARTHRITIS OF LEFT KNEE: ICD-10-CM

## 2019-11-11 NOTE — PROGRESS NOTES
Patient: Danielle Cruz. MRN: [id number]       SSN: [id number]  YOB: 1970        AGE: 51 y.o. SEX: male    PCP: Waqar Hermosillo NP  11/11/19    Chief Complaint   Patient presents with    Knee Pain     left knee pain     HISTORY:  Danielle Palomares is a 51 y.o. male who is seen for left knee pain. He has been experiencing knee pain for the past several weeks. He does not recall any injury. He notes pain with standing, walking, and stair climbing. He feels primarily anterior left knee pain. He experiences startup pain after sitting. He does not feel any pain today. Pain Assessment  11/11/2019   Location of Pain Knee   Location Modifiers Left   Severity of Pain 0   Quality of Pain Throbbing;Aching; Sharp   Duration of Pain A few minutes   Frequency of Pain Intermittent   Aggravating Factors Stairs; Bending   Limiting Behavior Yes   Relieving Factors (No Data)   Relieving Factors Comment pt does not use   Result of Injury No     Occupation, etc:  Mr. Aster Templeton works as a  at the [employer]. He lives in [facility] with his wife and 15 yo daughter. He likes to ride a bicycle and lift weights for exercise. Mr. Aster Templeton weighs 298 lbs and is 5'10\" tall. Lab Results   Component Value Date/Time    Hemoglobin A1c 9.9 (H) 06/27/2019 04:16 PM    Hemoglobin A1c (POC) 9.5 10/29/2019 07:53 AM     Weight Metrics 11/11/2019 10/29/2019 9/30/2019 9/16/2019 8/27/2019 6/27/2019 5/31/2019   Weight 301 lb 298 lb 303 lb 300 lb 299 lb 301 lb 9.6 oz 309 lb   BMI 43.19 kg/m2 42.76 kg/m2 43.48 kg/m2 43.05 kg/m2 42.9 kg/m2 43.28 kg/m2 44.34 kg/m2       Patient Active Problem List   Diagnosis Code    Obesity, morbid ([facility] [address] E66.01     REVIEW OF SYSTEMS: All Below are Negative except: See HPI   Constitutional: negative for fever, chills, and weight loss.    Cardiovascular: negative for chest pain, claudication, leg swelling, SOB, ALFONSO   Gastrointestinal: Negative for pain, N/V/C/D, Blood in stool or urine, dysuria, hematuria, incontinence, pelvic pain. Musculoskeletal: See HPI   Neurological: Negative for dizziness and weakness. Negative for headaches, Visual changes, confusion, seizures   Phychiatric/Behavioral: Negative for depression, memory loss, substance abuse. Extremities: Negative for hair changes, rash, or skin lesion changes. Hematologic: Negative for bleeding problems, bruising, pallor or swollen lymph nodes   Peripheral Vascular: No calf pain, no circulation deficits.     Social History     Socioeconomic History    Marital status:      Spouse name: Not on file    Number of children: Not on file    Years of education: Not on file    Highest education level: Not on file   Occupational History    Not on file   Social Needs    Financial resource strain: Not on file    Food insecurity:     Worry: Not on file     Inability: Not on file    Transportation needs:     Medical: Not on file     Non-medical: Not on file   Tobacco Use    Smoking status: Never Smoker    Smokeless tobacco: Never Used   Substance and Sexual Activity    Alcohol use: Yes     Comment: Occassional use    Drug use: Not on file     Comment: Occassional use    Sexual activity: Yes     Partners: Female     Birth control/protection: None   Lifestyle    Physical activity:     Days per week: Not on file     Minutes per session: Not on file    Stress: Not on file   Relationships    Social connections:     Talks on phone: Not on file     Gets together: Not on file     Attends Mandaen service: Not on file     Active member of club or organization: Not on file     Attends meetings of clubs or organizations: Not on file     Relationship status: Not on file    Intimate partner violence:     Fear of current or ex partner: Not on file     Emotionally abused: Not on file     Physically abused: Not on file     Forced sexual activity: Not on file   Other Topics Concern    Not on file   Social History Narrative    Not on file      Allergies   Allergen Reactions    Pcn [Penicillins] Other (comments)     Unknown was told when a child      Current Outpatient Medications   Medication Sig    metFORMIN (GLUCOPHAGE) 1,000 mg tablet Take 1 Tab by mouth two (2) times daily (with meals).  atorvastatin (LIPITOR) 80 mg tablet Take 1 Tab by mouth daily.  flash glucose scanning reader (FREESTYLE GERALD 14 DAY READER) [facility] Use to monitor blood sugar at least 3 times daily    empagliflozin (JARDIANCE) 25 mg tablet Take 1 Tab by mouth daily.  sildenafil citrate (VIAGRA) 50 mg tablet Take 1 Tab by mouth as needed (45 mins prior to intercourse).  lisinopril-hydroCHLOROthiazide (PRINZIDE, ZESTORETIC) 20-25 mg per tablet Take 1 Tab by mouth daily.  insulin glargine (LANTUS U-100 INSULIN) 100 unit/mL injection 100 Units by SubCUTAneous route every evening. (Patient taking differently: 90 Units by SubCUTAneous route every evening.)    flash glucose sensor (FREESTYLE GERALD 14 DAY SENSOR) kit Apply new sensor every 14 days to monitor blood sugar as directed. Please dispense 2 sensors.  multivitamin (ONE A DAY) tablet Take 1 Tab by mouth daily. No current facility-administered medications for this visit.        PHYSICAL EXAMINATION:  Visit Vitals  /60 (BP 1 Location: Left arm, BP Patient Position: Sitting)   Pulse 91   Temp 96.3 °F (35.7 °C) (Oral)   Resp 16   Ht 5' 10\" (1.778 m)   Wt 301 lb (136.5 kg)   SpO2 92%   BMI 43.19 kg/m²      ORTHO EXAMINATION:  Examination Right knee Left knee   Skin Intact Intact   Range of motion 130-0 120-0   Effusion - -   Medial joint line tenderness - + tender over patellar tendon   Lateral joint line tenderness - -   Popliteal tenderness - -   Osteophytes palpable + +   Rockys - -   Patella crepitus - -   Anterior drawer - -   Lateral laxity - -   Medial laxity - -   Varus deformity - -   Valgus deformity - -   Pretibial edema - -   Calf tenderness - - RADIOGRAPHS:  XR LEFT KNEE 11/11/19 HEDY  IMPRESSION:  Three views - No fractures, no effusion, mild lateral joint space narrowing, + lateral osteophytes present. Kellgren Troy grade 1     IMPRESSION:      ICD-10-CM ICD-9-CM    1. Patellar tendinitis of left knee M76.52 726.64 REFERRAL TO PHYSICAL THERAPY   2. Chronic pain of left knee M25.562 719.46 AMB POC X-RAY KNEE 3 VIEW    G89.29 338.29 REFERRAL TO PHYSICAL THERAPY   3. Primary osteoarthritis of left knee M17.12 715.16 REFERRAL TO PHYSICAL THERAPY     PLAN:  He will start a brief course of outpatient physical therapy. There is no need for surgery or injection at this time. He will follow up as needed.       Scribed by Zabrina Garza ([address]) as dictated by Matt Cooper MD

## 2019-11-11 NOTE — PROGRESS NOTES
1. Have you been to the ER, urgent care clinic since your last visit? Hospitalized since your last visit? NO    2. Have you seen or consulted any other health care providers outside of the [address] [place] [address] since your last visit? Include any pap smears or colon screening.    NO

## 2019-12-29 NOTE — LETTER
Name: Mariella Mitchell   Sex: male   : 1970  
[address] 
[phone] (home) To who it may concern,  
 
 
Mariella Mitchell was in our office today 10/29/2019. At his visit today he received the Influenza Vaccine. Current Immunizations: 
Immunization History Administered Date(s) Administered  Influenza Vaccine (Quad) PF 10/29/2019  Tdap 2015  Tetanus Toxoid, Adsorbed 2014 Allergies: Allergies as of 10/29/2019 - Review Complete 10/29/2019 Allergen Reaction Noted  Pcn [penicillins] Other (comments) 2018 If you have any questions please have the patient contact our office. [phone] Sincerely, 
TANIA Nettles NP-C 
 
 sudden onset

## 2020-01-28 DIAGNOSIS — E11.65 TYPE 2 DIABETES MELLITUS WITH HYPERGLYCEMIA, UNSPECIFIED WHETHER LONG TERM INSULIN USE (HCC): ICD-10-CM

## 2020-01-28 DIAGNOSIS — I10 ESSENTIAL HYPERTENSION: ICD-10-CM

## 2020-01-28 RX ORDER — METFORMIN HYDROCHLORIDE 1000 MG/1
1000 TABLET ORAL 2 TIMES DAILY WITH MEALS
Qty: 60 TAB | Refills: 3 | Status: SHIPPED | OUTPATIENT
Start: 2020-01-28 | End: 2021-09-22 | Stop reason: SINTOL

## 2020-01-28 RX ORDER — SILDENAFIL 50 MG/1
50 TABLET, FILM COATED ORAL AS NEEDED
Qty: 5 TAB | Refills: 0 | Status: SHIPPED | OUTPATIENT
Start: 2020-01-28 | End: 2020-03-10 | Stop reason: SDUPTHER

## 2020-01-28 RX ORDER — INSULIN GLARGINE 100 [IU]/ML
90 INJECTION, SOLUTION SUBCUTANEOUS EVERY EVENING
Qty: 3 VIAL | Refills: 2 | Status: SHIPPED | OUTPATIENT
Start: 2020-01-28 | End: 2020-04-03 | Stop reason: SDUPTHER

## 2020-01-28 RX ORDER — LISINOPRIL AND HYDROCHLOROTHIAZIDE 20; 25 MG/1; MG/1
1 TABLET ORAL DAILY
Qty: 90 TAB | Refills: 1 | Status: SHIPPED | OUTPATIENT
Start: 2020-01-28 | End: 2020-04-03 | Stop reason: SDUPTHER

## 2020-02-11 ENCOUNTER — OFFICE VISIT (OUTPATIENT)
Dept: FAMILY MEDICINE CLINIC | Age: 50
End: 2020-02-11

## 2020-02-11 VITALS
RESPIRATION RATE: 18 BRPM | TEMPERATURE: 98.1 F | WEIGHT: 306 LBS | HEIGHT: 70 IN | DIASTOLIC BLOOD PRESSURE: 63 MMHG | OXYGEN SATURATION: 98 % | BODY MASS INDEX: 43.81 KG/M2 | HEART RATE: 87 BPM | SYSTOLIC BLOOD PRESSURE: 113 MMHG

## 2020-02-11 DIAGNOSIS — E11.65 TYPE 2 DIABETES MELLITUS WITH HYPERGLYCEMIA, UNSPECIFIED WHETHER LONG TERM INSULIN USE (HCC): Primary | ICD-10-CM

## 2020-02-11 DIAGNOSIS — I10 ESSENTIAL HYPERTENSION: ICD-10-CM

## 2020-02-11 DIAGNOSIS — R10.9 ABDOMINAL CRAMPING: ICD-10-CM

## 2020-02-11 DIAGNOSIS — E78.1 HYPERTRIGLYCERIDEMIA: ICD-10-CM

## 2020-02-11 DIAGNOSIS — S91.109A OPEN WOUND OF TOE, INITIAL ENCOUNTER: ICD-10-CM

## 2020-02-11 LAB — HBA1C MFR BLD HPLC: 9.4 %

## 2020-02-11 NOTE — PROGRESS NOTES
Subjective:    Cole Hidalgo is a 53y.o. year old male seen for follow up of diabetes. He also has hypertension and hyperlipidemia. Diabetic Review of Systems - medication compliance: compliant all of the time, diabetic diet compliance: compliant most of the time, home glucose monitoring: is performed regularly, fasting values range high 100s-low 200s, further diabetic ROS: no polyuria or polydipsia, no chest pain, dyspnea or TIA's, no numbness, tingling or pain in extremities. Other symptoms and concerns: patient states in Dec and Jan he had 5 episodes of increase knee, ankle foot pain. States his employer informed him he needed to have his FMLA forms adjusted due to current FMLA forms being approved for 1 episode every 2 weeks, 1-2 days per episode. Comments because he had 5 different episodes he stayed out for 1 day each episode. Comments when pain is severe he has trouble working due to pain. Patient Active Problem List   Diagnosis Code    Obesity, morbid ([facility]) E66.01     Current Outpatient Medications   Medication Sig Dispense Refill    sildenafil citrate (VIAGRA) 50 mg tablet Take 1 Tab by mouth as needed (45 mins prior to intercourse). 5 Tab 0    metFORMIN (GLUCOPHAGE) 1,000 mg tablet Take 1 Tab by mouth two (2) times daily (with meals). 60 Tab 3    lisinopril-hydroCHLOROthiazide (PRINZIDE, ZESTORETIC) 20-25 mg per tablet Take 1 Tab by mouth daily. 90 Tab 1    atorvastatin (LIPITOR) 80 mg tablet Take 1 Tab by mouth daily. 90 Tab 1    flash glucose scanning reader (FREESTYLE GERALD 14 DAY READER) [facility] Use to monitor blood sugar at least 3 times daily 1 Each 0    empagliflozin (JARDIANCE) 25 mg tablet Take 1 Tab by mouth daily. 90 Tab 1    flash glucose sensor (FREESTYLE GERALD 14 DAY SENSOR) kit Apply new sensor every 14 days to monitor blood sugar as directed. Please dispense 2 sensors. 1 Kit 11    multivitamin (ONE A DAY) tablet Take 1 Tab by mouth daily.       insulin glargine (LANTUS U-100 INSULIN) 100 unit/mL injection 90 Units by SubCUTAneous route every evening. 3 Vial 2      Allergies   Allergen Reactions    Pcn [Penicillins] Other (comments)     Unknown was told when a child     Past Surgical History:   Procedure Laterality Date    HX APPENDECTOMY       Family History   Problem Relation Age of Onset    Cancer Mother         unknown ? breast    Heart Disease Father     No Known Problems Sister     Cancer Brother     No Known Problems Sister     No Known Problems Brother     No Known Problems Brother     Diabetes Daughter     Diabetes Daughter       Lab Results   Component Value Date/Time    Cholesterol, total 212 (H) 10/29/2019 08:05 AM    HDL Cholesterol 36 (L) 10/29/2019 08:05 AM    LDL, calculated 118.4 (H) 10/29/2019 08:05 AM    VLDL, calculated 57.6 10/29/2019 08:05 AM    Triglyceride 288 (H) 10/29/2019 08:05 AM    CHOL/HDL Ratio 5.9 (H) 10/29/2019 08:05 AM     Lab Results   Component Value Date/Time    Sodium 136 10/29/2019 08:05 AM    Potassium 4.7 10/29/2019 08:05 AM    Chloride 101 10/29/2019 08:05 AM    CO2 28 10/29/2019 08:05 AM    Anion gap 7 10/29/2019 08:05 AM    Glucose 163 (H) 10/29/2019 08:05 AM    BUN 38 (H) 10/29/2019 08:05 AM    Creatinine 1.86 (H) 10/29/2019 08:05 AM    BUN/Creatinine ratio 20 10/29/2019 08:05 AM    GFR est AA 47 (L) 10/29/2019 08:05 AM    GFR est non-AA 39 (L) 10/29/2019 08:05 AM    Calcium 9.7 10/29/2019 08:05 AM    Bilirubin, total 0.6 10/29/2019 08:05 AM    AST (SGOT) 28 10/29/2019 08:05 AM    Alk.  phosphatase 72 10/29/2019 08:05 AM    Protein, total 8.2 10/29/2019 08:05 AM    Albumin 4.3 10/29/2019 08:05 AM    Globulin 3.9 10/29/2019 08:05 AM    A-G Ratio 1.1 10/29/2019 08:05 AM    ALT (SGPT) 44 10/29/2019 08:05 AM     Lab Results   Component Value Date/Time    WBC 5.2 08/28/2019 07:19 AM    HGB 14.0 08/28/2019 07:19 AM    HCT 44.1 08/28/2019 07:19 AM    PLATELET 539 68/99/8414 07:19 AM    MCV 84.6 08/28/2019 07:19 AM     Lab Results   Component Value Date/Time    Hemoglobin A1c 9.9 (H) 06/27/2019 04:16 PM    Hemoglobin A1c (POC) 9.4 02/11/2020 11:06 AM     Lab Results   Component Value Date/Time    Microalbumin/Creat ratio (mg/g creat) 40 (H) 10/29/2019 08:05 AM    Microalbumin,urine random 6.28 (H) 10/29/2019 08:05 AM     Wt Readings from Last 3 Encounters:   02/11/20 306 lb (138.8 kg)   11/11/19 301 lb (136.5 kg)   10/29/19 298 lb (135.2 kg)     Last Point of Care HGB A1C  Hemoglobin A1c (POC)   Date Value Ref Range Status   02/11/2020 9.4 % Final      BP Readings from Last 3 Encounters:   02/11/20 113/63   11/11/19 100/60   10/29/19 115/77       Results for orders placed or performed in visit on 02/11/20   AMB POC HEMOGLOBIN A1C   Result Value Ref Range    Hemoglobin A1c (POC) 9.4 %         Last Diabetic Foot Exam on: 1/28/19        Objective:  Visit Vitals  /63 (BP 1 Location: Right arm, BP Patient Position: Sitting)   Pulse 87   Temp 98.1 °F (36.7 °C) (Oral)   Resp 18   Ht 5' 10\" (1.778 m)   Wt 306 lb (138.8 kg)   SpO2 98%   BMI 43.91 kg/m²     Awake and alert in no acute distress   Neck supple without lymphadenopathy, no carotid artery bruits auscultated bilaterally. No thyromegaly   Lungs clear throughout   S1 S2 RRR without ectopy or murmur auscultated. Extremities: no clubbing, cyanosis, peripheral edema   Abdomen - soft, nontender, nondistended, no masses or organomegaly  Integumentary: no rashes   Physical Exam  Musculoskeletal:        Feet:          Reviewed vital signs       Diabetic foot exam:     Left Foot:   Visual Exam: normal    Pulse DP: 2+ (normal)   Filament test: normal sensation    Vibratory sensation: normal      Right Foot:   Visual Exam: normal    Pulse DP: 2+ (normal)   Filament test: normal sensation    Vibratory sensation: normal        Assessment/Plan:    ICD-10-CM ICD-9-CM    1.  Type 2 diabetes mellitus with hyperglycemia, unspecified whether long term insulin use (HCC) E11.65 250.00 AMB POC HEMOGLOBIN A1C      HM DIABETES FOOT EXAM      HEMOGLOBIN A1C WITH EAG      LIPID PANEL      METABOLIC PANEL, COMPREHENSIVE      Blood-Glucose Sensor (DEXCOM G6 SENSOR) eliseo      Blood-Glucose Meter,Continuous (DEXCOM ) misc      Blood-Glucose Transmitter (DEXCOM G6 TRANSMITTER) eliseo   2. Essential hypertension I10 401.9 LIPID PANEL      METABOLIC PANEL, COMPREHENSIVE   3. Hypertriglyceridemia E78.1 272.1 LIPID PANEL      METABOLIC PANEL, COMPREHENSIVE   4. Open wound of toe, initial encounter S91.109A 893.0    5. Abdominal cramping R10.9 789.00      Orders Placed This Encounter    HEMOGLOBIN A1C WITH EAG    LIPID PANEL    METABOLIC PANEL, COMPREHENSIVE    AMB POC HEMOGLOBIN A1C    DISCONTD: flash glucose sensor (FREESTYLE GERALD 14 DAY SENSOR) kit    dulaglutide (TRULICITY) 5.73 LJ/1.8 mL sub-q pen    Blood-Glucose Sensor (DEXCOM G6 SENSOR) eliseo    Blood-Glucose Meter,Continuous (DEXCOM ) misc    Blood-Glucose Transmitter (DEXCOM G6 TRANSMITTER) eliseo         Issues reviewed with him: low cholesterol diet, weight control and daily exercise discussed. I have discussed the diagnosis with the patient and the intended plan as seen in the above orders. The patient has received an after-visit summary and questions were answered concerning future plans. I have discussed medication side effects and warnings with the patient as well. Patient agreeable with above plan and verbalizes understanding. Follow-up and Dispositions    · Return in about 4 weeks (around 3/10/2020) for DM since beginning trulicity.

## 2020-02-11 NOTE — TELEPHONE ENCOUNTER
Corrected dispense quantity to 2 kits per 30 days for [facility] sensors.

## 2020-02-11 NOTE — TELEPHONE ENCOUNTER
Per patient's insurance, the [facility] is no longer on the formulary. Dexcom CGM is the preferred continuous glucose monitoring system.   Verbal order per Starla Dailey NP.

## 2020-02-12 ENCOUNTER — HOSPITAL ENCOUNTER (OUTPATIENT)
Dept: LAB | Age: 50
Discharge: HOME | End: 2020-02-12
Payer: COMMERCIAL

## 2020-02-12 DIAGNOSIS — I10 ESSENTIAL HYPERTENSION: ICD-10-CM

## 2020-02-12 DIAGNOSIS — E78.1 HYPERTRIGLYCERIDEMIA: ICD-10-CM

## 2020-02-12 DIAGNOSIS — E11.65 TYPE 2 DIABETES MELLITUS WITH HYPERGLYCEMIA, UNSPECIFIED WHETHER LONG TERM INSULIN USE (HCC): ICD-10-CM

## 2020-02-12 LAB
ALBUMIN SERPL-MCNC: 4 G/DL (ref 3.4–5)
ALBUMIN/GLOB SERPL: 1 {RATIO} (ref 0.8–1.7)
ALP SERPL-CCNC: 66 U/L (ref 45–117)
ALT SERPL-CCNC: 43 U/L (ref 16–61)
ANION GAP SERPL CALC-SCNC: 5 MMOL/L (ref 3–18)
AST SERPL-CCNC: 26 U/L (ref 10–38)
BILIRUB SERPL-MCNC: 0.5 MG/DL (ref 0.2–1)
BUN SERPL-MCNC: 38 MG/DL (ref 7–18)
BUN/CREAT SERPL: 22 (ref 12–20)
CALCIUM SERPL-MCNC: 9.5 MG/DL (ref 8.5–10.1)
CHLORIDE SERPL-SCNC: 102 MMOL/L (ref 100–111)
CHOLEST SERPL-MCNC: 221 MG/DL
CO2 SERPL-SCNC: 32 MMOL/L (ref 21–32)
CREAT SERPL-MCNC: 1.74 MG/DL (ref 0.6–1.3)
EST. AVERAGE GLUCOSE BLD GHB EST-MCNC: 232 MG/DL
GLOBULIN SER CALC-MCNC: 3.9 G/DL (ref 2–4)
GLUCOSE SERPL-MCNC: 139 MG/DL (ref 74–99)
HBA1C MFR BLD: 9.7 % (ref 4.2–5.6)
HDLC SERPL-MCNC: 36 MG/DL (ref 40–60)
HDLC SERPL: 6.1 {RATIO} (ref 0–5)
LDLC SERPL CALC-MCNC: 135.8 MG/DL (ref 0–100)
LIPID PROFILE,FLP: ABNORMAL
POTASSIUM SERPL-SCNC: 4.4 MMOL/L (ref 3.5–5.5)
PROT SERPL-MCNC: 7.9 G/DL (ref 6.4–8.2)
SODIUM SERPL-SCNC: 139 MMOL/L (ref 136–145)
TRIGL SERPL-MCNC: 246 MG/DL (ref ?–150)
VLDLC SERPL CALC-MCNC: 49.2 MG/DL

## 2020-02-12 PROCEDURE — 80061 LIPID PANEL: CPT

## 2020-02-12 PROCEDURE — 36415 COLL VENOUS BLD VENIPUNCTURE: CPT

## 2020-02-12 PROCEDURE — 83036 HEMOGLOBIN GLYCOSYLATED A1C: CPT

## 2020-02-12 PROCEDURE — 80053 COMPREHEN METABOLIC PANEL: CPT

## 2020-02-21 ENCOUNTER — TELEPHONE (OUTPATIENT)
Dept: FAMILY MEDICINE CLINIC | Age: 50
End: 2020-02-21

## 2020-02-24 ENCOUNTER — TELEPHONE (OUTPATIENT)
Dept: FAMILY MEDICINE CLINIC | Age: 50
End: 2020-02-24

## 2020-02-24 NOTE — TELEPHONE ENCOUNTER
Spoke to patient and informed him that the documents that he dropped off is missing page 4 which is the signature page. Pt states that he will try to get this form today when he goes to work. Call was also placed to employer and voicemail message left along with fax number.

## 2020-03-10 ENCOUNTER — OFFICE VISIT (OUTPATIENT)
Dept: FAMILY MEDICINE CLINIC | Age: 50
End: 2020-03-10

## 2020-03-10 VITALS
BODY MASS INDEX: 44.52 KG/M2 | TEMPERATURE: 97.7 F | WEIGHT: 311 LBS | OXYGEN SATURATION: 98 % | RESPIRATION RATE: 18 BRPM | HEIGHT: 70 IN | DIASTOLIC BLOOD PRESSURE: 62 MMHG | SYSTOLIC BLOOD PRESSURE: 113 MMHG | HEART RATE: 89 BPM

## 2020-03-10 DIAGNOSIS — E11.65 TYPE 2 DIABETES MELLITUS WITH HYPERGLYCEMIA, UNSPECIFIED WHETHER LONG TERM INSULIN USE (HCC): Primary | ICD-10-CM

## 2020-03-10 DIAGNOSIS — R10.9 ABDOMINAL CRAMPING: ICD-10-CM

## 2020-03-10 LAB — HBA1C MFR BLD HPLC: 8.9 %

## 2020-03-10 RX ORDER — SILDENAFIL 100 MG/1
100 TABLET, FILM COATED ORAL AS NEEDED
Qty: 10 TAB | Refills: 1 | Status: SHIPPED | OUTPATIENT
Start: 2020-03-10 | End: 2020-09-01 | Stop reason: SDUPTHER

## 2020-03-10 RX ORDER — SILDENAFIL 50 MG/1
50 TABLET, FILM COATED ORAL AS NEEDED
Qty: 5 TAB | Refills: 0 | Status: SHIPPED | OUTPATIENT
Start: 2020-03-10 | End: 2020-03-10 | Stop reason: SDUPTHER

## 2020-03-10 NOTE — PROGRESS NOTES
Subjective:    Joseph Beverly is a 53y.o. year old male seen for follow up of diabetes since beginning Trulicity  He also has hypertension and hyperlipidemia. Diabetic Review of Systems - medication compliance: compliant all of the time, diabetic diet compliance: noncompliant some of the time, home glucose monitoring: is performed regularly, fasting values range 70-80s, nonfasting values range low 200s hr after eating, further diabetic ROS: no polyuria or polydipsia, no chest pain, dyspnea or TIA's, no numbness, tingling or pain in extremities. Other symptoms and concerns: states he has been having abd cramping. Denies worsening. States this tends to occur if he sneezes 'hard' then he will have upper abd pain tightening. Patient states he decreased his metformin to once daily due to loose stools and cramping/spasms decreased. Patient Active Problem List   Diagnosis Code    Obesity, morbid ([facility] [address] E66.01     Current Outpatient Medications   Medication Sig Dispense Refill    dulaglutide (TRULICITY) 2.23 XL/8.8 mL sub-q pen 0.5 mL by SubCUTAneous route every seven (7) days. 4 Pen 0    flash glucose sensor (FREESTYLE GERALD 14 DAY SENSOR) kit Apply new sensor every 14 days to monitor blood sugar as directed. Please dispense 2 sensors. 2 Kit 11    Blood-Glucose Sensor (DEXCOM G6 SENSOR) eliseo Use to check blood sugar 4 or more times daily. 3 Device 11    Blood-Glucose Meter,Continuous (DEXCOM ) misc Use to check blood sugar 4 or more times daily. 1 Each 1    Blood-Glucose Transmitter (DEXCOM G6 TRANSMITTER) eliseo Use with Dexcom CGM to check blood sugar 4 or more times daily. 1 Device 2    sildenafil citrate (VIAGRA) 50 mg tablet Take 1 Tab by mouth as needed (45 mins prior to intercourse). 5 Tab 0    metFORMIN (GLUCOPHAGE) 1,000 mg tablet Take 1 Tab by mouth two (2) times daily (with meals).  60 Tab 3    lisinopril-hydroCHLOROthiazide (PRINZIDE, ZESTORETIC) 20-25 mg per tablet Take 1 Tab by mouth daily. 90 Tab 1    insulin glargine (LANTUS U-100 INSULIN) 100 unit/mL injection 90 Units by SubCUTAneous route every evening. 3 Vial 2    atorvastatin (LIPITOR) 80 mg tablet Take 1 Tab by mouth daily. 90 Tab 1    flash glucose scanning reader ([employer] GERALD 14 DAY READER) [facility] Use to monitor blood sugar at least 3 times daily 1 Each 0    empagliflozin (JARDIANCE) 25 mg tablet Take 1 Tab by mouth daily. 90 Tab 1    multivitamin (ONE A DAY) tablet Take 1 Tab by mouth daily. Allergies   Allergen Reactions    Pcn [Penicillins] Other (comments)     Unknown was told when a child     Past Surgical History:   Procedure Laterality Date    HX APPENDECTOMY       Family History   Problem Relation Age of Onset    Cancer Mother         unknown ? breast    Heart Disease Father     No Known Problems Sister     Cancer Brother     No Known Problems Sister     No Known Problems Brother     No Known Problems Brother     Diabetes Daughter     Diabetes Daughter       Lab Results   Component Value Date/Time    Cholesterol, total 221 (H) 02/12/2020 10:30 AM    HDL Cholesterol 36 (L) 02/12/2020 10:30 AM    LDL, calculated 135.8 (H) 02/12/2020 10:30 AM    VLDL, calculated 49.2 02/12/2020 10:30 AM    Triglyceride 246 (H) 02/12/2020 10:30 AM    CHOL/HDL Ratio 6.1 (H) 02/12/2020 10:30 AM     Lab Results   Component Value Date/Time    Sodium 139 02/12/2020 10:30 AM    Potassium 4.4 02/12/2020 10:30 AM    Chloride 102 02/12/2020 10:30 AM    CO2 32 02/12/2020 10:30 AM    Anion gap 5 02/12/2020 10:30 AM    Glucose 139 (H) 02/12/2020 10:30 AM    BUN 38 (H) 02/12/2020 10:30 AM    Creatinine 1.74 (H) 02/12/2020 10:30 AM    BUN/Creatinine ratio 22 (H) 02/12/2020 10:30 AM    GFR est AA 51 (L) 02/12/2020 10:30 AM    GFR est non-AA 42 (L) 02/12/2020 10:30 AM    Calcium 9.5 02/12/2020 10:30 AM    Bilirubin, total 0.5 02/12/2020 10:30 AM    AST (SGOT) 26 02/12/2020 10:30 AM    Alk.  phosphatase 66 02/12/2020 10:30 AM Protein, total 7.9 02/12/2020 10:30 AM    Albumin 4.0 02/12/2020 10:30 AM    Globulin 3.9 02/12/2020 10:30 AM    A-G Ratio 1.0 02/12/2020 10:30 AM    ALT (SGPT) 43 02/12/2020 10:30 AM     Lab Results   Component Value Date/Time    WBC 5.2 08/28/2019 07:19 AM    HGB 14.0 08/28/2019 07:19 AM    HCT 44.1 08/28/2019 07:19 AM    PLATELET 510 24/96/7966 07:19 AM    MCV 84.6 08/28/2019 07:19 AM     Lab Results   Component Value Date/Time    Hemoglobin A1c 9.7 (H) 02/12/2020 10:30 AM    Hemoglobin A1c (POC) 9.4 02/11/2020 11:06 AM     Lab Results   Component Value Date/Time    Microalbumin/Creat ratio (mg/g creat) 40 (H) 10/29/2019 08:05 AM    Microalbumin,urine random 6.28 (H) 10/29/2019 08:05 AM     Wt Readings from Last 3 Encounters:   02/11/20 306 lb (138.8 kg)   11/11/19 301 lb (136.5 kg)   10/29/19 298 lb (135.2 kg)     Last Point of Care HGB A1C  Hemoglobin A1c (POC)   Date Value Ref Range Status   02/11/2020 9.4 % Final      BP Readings from Last 3 Encounters:   02/11/20 113/63   11/11/19 100/60   10/29/19 115/77       Results for orders placed or performed during the hospital encounter of 02/12/20   HEMOGLOBIN A1C WITH EAG   Result Value Ref Range    Hemoglobin A1c 9.7 (H) 4.2 - 5.6 %    Est. average glucose 232 mg/dL   LIPID PANEL   Result Value Ref Range    LIPID PROFILE          Cholesterol, total 221 (H) <200 MG/DL    Triglyceride 246 (H) <150 MG/DL    HDL Cholesterol 36 (L) 40 - 60 MG/DL    LDL, calculated 135.8 (H) 0 - 100 MG/DL    VLDL, calculated 49.2 MG/DL    CHOL/HDL Ratio 6.1 (H) 0 - 5.0     METABOLIC PANEL, COMPREHENSIVE   Result Value Ref Range    Sodium 139 136 - 145 mmol/L    Potassium 4.4 3.5 - 5.5 mmol/L    Chloride 102 100 - 111 mmol/L    CO2 32 21 - 32 mmol/L    Anion gap 5 3.0 - 18 mmol/L    Glucose 139 (H) 74 - 99 mg/dL    BUN 38 (H) 7.0 - 18 MG/DL    Creatinine 1.74 (H) 0.6 - 1.3 MG/DL    BUN/Creatinine ratio 22 (H) 12 - 20      GFR est AA 51 (L) >60 ml/min/1.73m2    GFR est non-AA 42 (L) >60 ml/min/1.73m2    Calcium 9.5 8.5 - 10.1 MG/DL    Bilirubin, total 0.5 0.2 - 1.0 MG/DL    ALT (SGPT) 43 16 - 61 U/L    AST (SGOT) 26 10 - 38 U/L    Alk. phosphatase 66 45 - 117 U/L    Protein, total 7.9 6.4 - 8.2 g/dL    Albumin 4.0 3.4 - 5.0 g/dL    Globulin 3.9 2.0 - 4.0 g/dL    A-G Ratio 1.0 0.8 - 1.7           Last Diabetic Foot Exam on: 2/11/2020        Objective:  Visit Vitals  /62 (BP 1 Location: Right arm, BP Patient Position: Sitting)   Pulse 89   Temp 97.7 °F (36.5 °C) (Oral)   Resp 18   Ht 5' 10\" (1.778 m)   Wt 311 lb (141.1 kg)   SpO2 98%   BMI 44.62 kg/m²     Awake and alert in no acute distress   Neck supple without lymphadenopathy, no carotid artery bruits auscultated bilaterally. No thyromegaly   Lungs clear throughout   S1 S2 RRR without ectopy or murmur auscultated. Extremities: no clubbing, cyanosis, peripheral edema   Abdomen - soft, nontender, nondistended, no masses or organomegaly  Integumentary: no rashes   Reviewed vital signs         Assessment/Plan:    ICD-10-CM ICD-9-CM    1. Type 2 diabetes mellitus with hyperglycemia, unspecified whether long term insulin use (HCC) E11.65 250.00 AMB POC HEMOGLOBIN A1C   2. Abdominal cramping R10.9 789.00      Orders Placed This Encounter    AMB POC HEMOGLOBIN A1C    DISCONTD: sildenafil citrate (VIAGRA) 50 mg tablet    dulaglutide (TRULICITY) 1.5 AP/2.8 mL sub-q pen    sildenafil citrate (VIAGRA) 100 mg tablet          Stop metformin for now  Decrease lantus to 80 units  Issues reviewed with him: low cholesterol diet, weight control and daily exercise discussed. I have discussed the diagnosis with the patient and the intended plan as seen in the above orders. The patient has received an after-visit summary and questions were answered concerning future plans. I have discussed medication side effects and warnings with the patient as well. Patient agreeable with above plan and verbalizes understanding.       Follow-up and Dispositions · Return in about 2 months (around 5/10/2020) for DM.

## 2020-04-03 DIAGNOSIS — E11.65 TYPE 2 DIABETES MELLITUS WITH HYPERGLYCEMIA, UNSPECIFIED WHETHER LONG TERM INSULIN USE (HCC): ICD-10-CM

## 2020-04-03 DIAGNOSIS — I10 ESSENTIAL HYPERTENSION: ICD-10-CM

## 2020-04-03 RX ORDER — LISINOPRIL AND HYDROCHLOROTHIAZIDE 20; 25 MG/1; MG/1
1 TABLET ORAL DAILY
Qty: 90 TAB | Refills: 1 | Status: SHIPPED | OUTPATIENT
Start: 2020-04-03 | End: 2020-08-12 | Stop reason: ALTCHOICE

## 2020-04-03 RX ORDER — INSULIN GLARGINE 100 [IU]/ML
90 INJECTION, SOLUTION SUBCUTANEOUS EVERY EVENING
Qty: 3 VIAL | Refills: 2 | Status: SHIPPED | OUTPATIENT
Start: 2020-04-03 | End: 2020-06-01

## 2020-04-03 NOTE — TELEPHONE ENCOUNTER
Last Visit: 3/10/20 with MANDO Toney  Next Appointment: 20 with MANDO Toney  Previous Refill Encounter(s): 20 Prinzide #90 with 1 refill & Lantus #3 with 2 refills    Requested Prescriptions     Pending Prescriptions Disp Refills    lisinopril-hydroCHLOROthiazide (PRINZIDE, ZESTORETIC) 20-25 mg per tablet 90 Tab 1     Sig: Take 1 Tab by mouth daily.  insulin glargine (Lantus U-100 Insulin) 100 unit/mL injection 3 Vial 2     Si Units by SubCUTAneous route every evening.

## 2020-05-11 ENCOUNTER — VIRTUAL VISIT (OUTPATIENT)
Dept: FAMILY MEDICINE CLINIC | Age: 50
End: 2020-05-11

## 2020-05-11 DIAGNOSIS — E11.65 TYPE 2 DIABETES MELLITUS WITH HYPERGLYCEMIA, UNSPECIFIED WHETHER LONG TERM INSULIN USE (HCC): Primary | ICD-10-CM

## 2020-05-11 RX ORDER — BLOOD-GLUCOSE SENSOR
EACH MISCELLANEOUS
Qty: 3 DEVICE | Refills: 11 | Status: SHIPPED | OUTPATIENT
Start: 2020-05-11 | End: 2022-02-03

## 2020-05-11 RX ORDER — BLOOD PRESSURE TEST KIT-MEDIUM
KIT MISCELLANEOUS
Qty: 1 EACH | Refills: 0 | Status: SHIPPED | OUTPATIENT
Start: 2020-05-11

## 2020-05-11 RX ORDER — FLASH GLUCOSE SENSOR
KIT MISCELLANEOUS
Qty: 2 KIT | Refills: 11 | Status: SHIPPED | OUTPATIENT
Start: 2020-05-11 | End: 2020-12-22 | Stop reason: SDUPTHER

## 2020-05-11 RX ORDER — LISINOPRIL 30 MG/1
30 TABLET ORAL DAILY
Qty: 30 TAB | Refills: 2 | Status: SHIPPED | OUTPATIENT
Start: 2020-05-11 | End: 2020-08-10 | Stop reason: SDUPTHER

## 2020-05-11 RX ORDER — ATORVASTATIN CALCIUM 80 MG/1
80 TABLET, FILM COATED ORAL DAILY
Qty: 90 TAB | Refills: 1 | Status: SHIPPED | OUTPATIENT
Start: 2020-05-11 | End: 2020-06-09 | Stop reason: SINTOL

## 2020-05-11 RX ORDER — BLOOD-GLUCOSE TRANSMITTER
EACH MISCELLANEOUS
Qty: 1 DEVICE | Refills: 2 | Status: SHIPPED | OUTPATIENT
Start: 2020-05-11 | End: 2022-02-03

## 2020-05-11 NOTE — PROGRESS NOTES
Rayne Bates is a 53 y.o. male who was seen by synchronous (real-time) audio-video technology on 5/11/2020. Consent: Rayne Bates, who was seen by synchronous (real-time) audio-video technology, and/or his healthcare decision maker, is aware that this patient-initiated, Telehealth encounter on 5/11/2020 is a billable service, with coverage as determined by his insurance carrier. He is aware that he may receive a bill and has provided verbal consent to proceed: Yes. Assessment & Plan:   Diagnoses and all orders for this visit:    1. Type 2 diabetes mellitus with hyperglycemia, unspecified whether long term insulin use (HCC)  -     [facility]  -     Blood-Glucose Transmitter (Dexcom G6 Transmitter) eliseo; Use with Dexcom CGM to check blood sugar 4 or more times daily.  -     Blood-Glucose Sensor (Dexcom G6 Sensor) eliseo; Use to check blood sugar 4 or more times daily. Other orders  -     empagliflozin (Jardiance) 25 mg tablet; Take 1 Tab by mouth daily. -     atorvastatin (LIPITOR) 80 mg tablet; Take 1 Tab by mouth daily. -     lisinopriL (PRINIVIL, ZESTRIL) 30 mg tablet; Take 1 Tab by mouth daily.  -     Blood Pressure Kit-Extra Large kit; Take blood pressure as directed  -     flash glucose sensor (FreeStyle Jarad 14 Day Sensor) kit; Apply new sensor every 14 days to monitor blood sugar as directed. Please dispense 2 sensors. Hold hctz for abd cramping    I spent at least 23 minutes on this visit with this established patient. [phone]  Subjective:   Rayne Bates is a 53 y.o. male who was seen for Diabetes    Diabetic Review of Systems - medication compliance: compliant all of the time, diet compliance: noncompliant some of the time, home glucose monitoring: is performed regularly, fasting values range low 200s, avg. 30 day 191- further ROS: no polyuria or polydipsia, no chest pain, dyspnea or TIA's, no numbness, tingling or pain in extremities.   Comments he stopped taking metformin and he does continue to have muscle spasms in abdomen with sneezing. Patient reports he has taken his metformin twice since his last visit. Prior to Admission medications    Medication Sig Start Date End Date Taking? Authorizing Provider   lisinopril-hydroCHLOROthiazide (PRINZIDE, ZESTORETIC) 20-25 mg per tablet Take 1 Tab by mouth daily. 4/3/20   Suhas PENDLETON NP   insulin glargine (Lantus U-100 Insulin) 100 unit/mL injection 90 Units by SubCUTAneous route every evening. 4/3/20   Suhas PENDLETON NP   dulaglutide (TRULICITY) 1.5 WH/4.1 mL sub-q pen 0.5 mL by SubCUTAneous route every seven (7) days. 3/10/20   Suhas PENDLETON NP   sildenafil citrate (VIAGRA) 100 mg tablet Take 1 Tab by mouth as needed (45 mins prior to intercourse). 3/10/20   Suhas PENDLETON NP   dulaglutide (TRULICITY) 3.49 OF/3.0 mL sub-q pen 0.5 mL by SubCUTAneous route every seven (7) days. 2/11/20   Suhas PENDLETON NP   flash glucose sensor (FREESTYLE GERALD 14 DAY SENSOR) kit Apply new sensor every 14 days to monitor blood sugar as directed. Please dispense 2 sensors. 2/11/20   Suhas PENDLETON NP   Blood-Glucose Sensor (DEXCOM G6 SENSOR) eliseo Use to check blood sugar 4 or more times daily. 2/11/20   Suhas PENDLETON NP   Blood-Glucose Meter,[facility] ) misc Use to check blood sugar 4 or more times daily. 2/11/20   Ton Torres NP   Blood-Glucose Transmitter (DEXCOM G6 TRANSMITTER) eliseo Use with Dexcom CGM to check blood sugar 4 or more times daily. 2/11/20   Ton Torres NP   metFORMIN (GLUCOPHAGE) 1,000 mg tablet Take 1 Tab by mouth two (2) times daily (with meals). 1/28/20   Suhas PENDLETON NP   atorvastatin (LIPITOR) 80 mg tablet Take 1 Tab by mouth daily.  10/29/19   Suhas PENDLETON NP   flash glucose scanning reader (FREESTYLE GERALD 14 DAY READER) [facility] Use to monitor blood sugar at least 3 times daily 10/29/19   Suhas Rhodes T, NP   empagliflozin (JARDIANCE) 25 mg tablet Take 1 Tab by mouth daily. 10/17/19   La Varela T, NP   multivitamin (ONE A DAY) tablet Take 1 Tab by mouth daily. Provider, Historical     Allergies   Allergen Reactions    Pcn [Penicillins] Other (comments)     Unknown was told when a child       Patient Active Problem List   Diagnosis Code    Obesity, morbid ([facility] [address] E66.01     Patient Active Problem List    Diagnosis Date Noted    [facility], [facility] 08/14/2018     Current Outpatient Medications   Medication Sig Dispense Refill    lisinopril-hydroCHLOROthiazide (PRINZIDE, ZESTORETIC) 20-25 mg per tablet Take 1 Tab by mouth daily. 90 Tab 1    insulin glargine (Lantus U-100 Insulin) 100 unit/mL injection 90 Units by SubCUTAneous route every evening. 3 Vial 2    dulaglutide (TRULICITY) 1.5 GW/1.2 mL sub-q pen 0.5 mL by SubCUTAneous route every seven (7) days. 4 Pen 2    sildenafil citrate (VIAGRA) 100 mg tablet Take 1 Tab by mouth as needed (45 mins prior to intercourse). 10 Tab 1    dulaglutide (TRULICITY) 4.03 GT/9.2 mL sub-q pen 0.5 mL by SubCUTAneous route every seven (7) days. 4 Pen 0    flash glucose sensor (FREESTYLE GERALD 14 DAY SENSOR) kit Apply new sensor every 14 days to monitor blood sugar as directed. Please dispense 2 sensors. 2 Kit 11    Blood-Glucose Sensor (DEXCOM G6 SENSOR) eliseo Use to check blood sugar 4 or more times daily. 3 Device 11    Blood-Glucose Meter,Continuous (DEXCOM ) misc Use to check blood sugar 4 or more times daily. 1 Each 1    Blood-Glucose Transmitter (DEXCOM G6 TRANSMITTER) eliseo Use with Dexcom CGM to check blood sugar 4 or more times daily. 1 Device 2    metFORMIN (GLUCOPHAGE) 1,000 mg tablet Take 1 Tab by mouth two (2) times daily (with meals). 60 Tab 3    atorvastatin (LIPITOR) 80 mg tablet Take 1 Tab by mouth daily.  90 Tab 1    flash glucose scanning reader (FREESTYLE GERALD 14 DAY READER) misc Use to monitor blood sugar at least 3 times daily 1 Each 0    empagliflozin (JARDIANCE) 25 mg tablet Take 1 Tab by mouth daily. 90 Tab 1    multivitamin (ONE A DAY) tablet Take 1 Tab by mouth daily. Allergies   Allergen Reactions    Pcn [Penicillins] Other (comments)     Unknown was told when a child     Past Medical History:   Diagnosis Date    Diabetes ([address]     Hypercholesterolemia     Hypertension      Past Surgical History:   Procedure Laterality Date    HX APPENDECTOMY       Family History   Problem Relation Age of Onset    Cancer Mother         unknown ? breast    Heart Disease Father     No Known Problems Sister     Cancer Brother     No Known Problems Sister     No Known Problems Brother     No Known Problems Brother     Diabetes Daughter     Diabetes Daughter      Social History     Tobacco Use    Smoking status: Never Smoker    Smokeless tobacco: Never Used   Substance Use Topics    Alcohol use: Yes     Comment: Occassional use     Review of Systems   Constitutional: Negative for chills and fever. Respiratory: Negative for shortness of breath. Cardiovascular: Negative for chest pain and palpitations. Neurological: Negative for dizziness and headaches. Objective: There were no vitals taken for this visit. General: alert, cooperative, no distress   Mental  status: normal mood, behavior, speech, dress, motor activity, and thought processes, able to follow commands   HENT: NCAT   Neck: no visualized mass   Resp: no respiratory distress   Neuro: no gross deficits   Skin: no discoloration or lesions of concern on visible areas   Psychiatric: normal affect, consistent with stated mood, no evidence of hallucinations     Additional exam findings: We discussed the expected course, resolution and complications of the diagnosis(es) in detail. Medication risks, benefits, costs, interactions, and alternatives were discussed as indicated. I advised him to contact the office if his condition worsens, changes or fails to improve as anticipated.  He expressed understanding with the diagnosis(es) and plan. Cira Head. is a 53 y.o. male who was evaluated by a video visit encounter for concerns as above. Patient identification was verified prior to start of the visit. A caregiver was present when appropriate. Due to this being a TeleHealth encounter (During YKJMP-73 public health emergency), evaluation of the following organ systems was limited: Vitals/Constitutional/EENT/Resp/CV/GI//MS/Neuro/Skin/Heme-Lymph-Imm. Pursuant to the emergency declaration under the [address] [place] [address], [facility] waiver authority and the [employer] Act, this Virtual  Visit was conducted, with patient's (and/or legal guardian's) consent, to reduce the patient's risk of exposure to COVID-19 and provide necessary medical care. Services were provided through a video synchronous discussion virtually to substitute for in-person clinic visit. Patient was located in their home and I was in the office while conducting this encounter.     Gavin Christensen NP

## 2020-05-19 DIAGNOSIS — E11.65 TYPE 2 DIABETES MELLITUS WITH HYPERGLYCEMIA, UNSPECIFIED WHETHER LONG TERM INSULIN USE (HCC): ICD-10-CM

## 2020-05-19 RX ORDER — INSULIN GLARGINE 100 [IU]/ML
90 INJECTION, SOLUTION SUBCUTANEOUS EVERY EVENING
Qty: 3 VIAL | Refills: 2 | Status: CANCELLED | OUTPATIENT
Start: 2020-05-19

## 2020-05-20 NOTE — TELEPHONE ENCOUNTER
Prescription for Lantus vial #3 vial with 2 refills was sent to [facility] on 04/3/2020. No change in insulin dosage as of last Virtual visit on 2020. Have patient contact pharmacy. No further action is required. Thank you. Last visit 2020 Virtual visit MANDO Prado   Next appointment 4 weeks (2020)   Previous refill encounter(s) 2020 Lantus #3 vials with 2 refills     Requested Prescriptions     Pending Prescriptions Disp Refills    insulin glargine (Lantus U-100 Insulin) 100 unit/mL injection 3 Vial 2     Si Units by SubCUTAneous route every evening.

## 2020-06-09 DIAGNOSIS — E11.65 TYPE 2 DIABETES MELLITUS WITH HYPERGLYCEMIA, UNSPECIFIED WHETHER LONG TERM INSULIN USE (HCC): ICD-10-CM

## 2020-06-09 RX ORDER — DULAGLUTIDE 1.5 MG/.5ML
1.5 INJECTION, SOLUTION SUBCUTANEOUS
Qty: 6 ML | Refills: 3 | Status: SHIPPED | OUTPATIENT
Start: 2020-06-09 | End: 2021-05-20

## 2020-06-09 RX ORDER — INSULIN GLARGINE 100 [IU]/ML
80 INJECTION, SOLUTION SUBCUTANEOUS EVERY EVENING
Qty: 30 ML | Refills: 1 | Status: SHIPPED | OUTPATIENT
Start: 2020-06-09 | End: 2020-06-22 | Stop reason: CLARIF

## 2020-06-09 RX ORDER — ROSUVASTATIN CALCIUM 20 MG/1
20 TABLET, COATED ORAL
Qty: 90 TAB | Refills: 0 | Status: SHIPPED | OUTPATIENT
Start: 2020-06-09 | End: 2020-09-01

## 2020-06-09 NOTE — TELEPHONE ENCOUNTER
Last Visit: 20 with  [facility]  Next Appointment: Advised to follow-up in 4 weeks  Previous Refill Encounter(s): 3/10/20 #4 with 2 refills    Requested Prescriptions     Pending Prescriptions Disp Refills    dulaglutide (Trulicity) 1.5 YOUSIF/7.1 mL sub-q pen 6 mL 3     Si.5 mL by SubCUTAneous route every seven (7) days.

## 2020-06-22 DIAGNOSIS — E11.65 TYPE 2 DIABETES MELLITUS WITH HYPERGLYCEMIA, UNSPECIFIED WHETHER LONG TERM INSULIN USE (HCC): ICD-10-CM

## 2020-06-22 RX ORDER — INSULIN GLARGINE 100 [IU]/ML
INJECTION, SOLUTION SUBCUTANEOUS
Qty: 5 ADJUSTABLE DOSE PRE-FILLED PEN SYRINGE | Refills: 3 | Status: SHIPPED | OUTPATIENT
Start: 2020-06-22 | End: 2020-10-01

## 2020-06-22 RX ORDER — PEN NEEDLE, DIABETIC 30 GX3/16"
NEEDLE, DISPOSABLE MISCELLANEOUS
Qty: 1 PACKAGE | Refills: 11 | Status: SHIPPED | OUTPATIENT
Start: 2020-06-22 | End: 2022-10-05 | Stop reason: SDUPTHER

## 2020-08-10 NOTE — TELEPHONE ENCOUNTER
Insurance requires a minimum fill for 90 days  Previous Rx only has 60 tabs remaining so pharmacy is requesting a new Rx    Last Visit: 5/11/20 with MANDO Lino  Next Appointment: none  Previous Refill Encounter(s): 5/11/20 #30 with 2 refills    Requested Prescriptions     Pending Prescriptions Disp Refills    lisinopriL (PRINIVIL, ZESTRIL) 30 mg tablet 90 Tab 0     Sig: Take 1 Tab by mouth daily.

## 2020-08-12 RX ORDER — LISINOPRIL 30 MG/1
30 TABLET ORAL DAILY
Qty: 90 TAB | Refills: 0 | Status: SHIPPED | OUTPATIENT
Start: 2020-08-12 | End: 2020-10-14

## 2020-08-31 ENCOUNTER — TELEPHONE (OUTPATIENT)
Dept: FAMILY MEDICINE CLINIC | Age: 50
End: 2020-08-31

## 2020-08-31 NOTE — TELEPHONE ENCOUNTER
Patient needs clarification on several medications that the pharmacy filled for him.  He stated that he did not request all the refills and needs the nurse to help him figure out which ones to takes asap

## 2020-09-01 RX ORDER — ROSUVASTATIN CALCIUM 20 MG/1
TABLET, COATED ORAL
Qty: 90 TAB | Refills: 0 | Status: SHIPPED | OUTPATIENT
Start: 2020-09-01 | End: 2020-10-28

## 2020-09-01 RX ORDER — SILDENAFIL 100 MG/1
100 TABLET, FILM COATED ORAL AS NEEDED
Qty: 10 TAB | Refills: 1 | Status: SHIPPED | OUTPATIENT
Start: 2020-09-01 | End: 2020-12-22 | Stop reason: SDUPTHER

## 2020-09-01 NOTE — TELEPHONE ENCOUNTER
Spoke with the patient today. Inquiring which cholesterol medication is he currently taking. per chart review, he is to take crestor. He verbalized understanding.

## 2020-09-29 RX ORDER — LISINOPRIL 30 MG/1
TABLET ORAL
Qty: 90 TAB | Refills: 0 | OUTPATIENT
Start: 2020-09-29

## 2020-09-29 RX ORDER — ROSUVASTATIN CALCIUM 20 MG/1
TABLET, COATED ORAL
Qty: 90 TAB | Refills: 0 | OUTPATIENT
Start: 2020-09-29

## 2020-09-29 RX ORDER — INSULIN GLARGINE 100 [IU]/ML
INJECTION, SOLUTION SUBCUTANEOUS
Qty: 15 ML | Refills: 3 | OUTPATIENT
Start: 2020-09-29

## 2020-09-29 NOTE — TELEPHONE ENCOUNTER
Confirmed with pharmacy - patient got a 90 d/s of Crestor on 9/1/20, Lisinopril on 8/12/20 and still has refills on Basaglar insulin. A refill is not needed at this time.

## 2020-10-01 RX ORDER — ROSUVASTATIN CALCIUM 20 MG/1
TABLET, COATED ORAL
Qty: 90 TAB | Refills: 0 | OUTPATIENT
Start: 2020-10-01

## 2020-10-01 RX ORDER — INSULIN GLARGINE 100 [IU]/ML
INJECTION, SOLUTION SUBCUTANEOUS
Qty: 15 ADJUSTABLE DOSE PRE-FILLED PEN SYRINGE | Refills: 3 | Status: SHIPPED | OUTPATIENT
Start: 2020-10-01 | End: 2021-02-01

## 2020-10-01 RX ORDER — LISINOPRIL 30 MG/1
TABLET ORAL
Qty: 90 TAB | Refills: 0 | OUTPATIENT
Start: 2020-10-01

## 2020-10-14 RX ORDER — LISINOPRIL 30 MG/1
TABLET ORAL
Qty: 90 TAB | Refills: 0 | Status: SHIPPED | OUTPATIENT
Start: 2020-10-14 | End: 2021-02-01

## 2020-10-14 NOTE — PROGRESS NOTES
HPI:   2 week flare of chronic, intermittent back pain w/o radiation, rash or trauma; no weakness, cough  Pain primarily mid (R) paravertebral thoracic area    ROS is otherwise negative.     Past Medical History:   Diagnosis Date    Diabetes ([address]     Hypercholesterolemia     Hypertension        Past Surgical History:   Procedure Laterality Date    HX APPENDECTOMY         Social History     Socioeconomic History    Marital status:      Spouse name: Not on file    Number of children: Not on file    Years of education: Not on file    Highest education level: Not on file   Occupational History    Not on file   Social Needs    Financial resource strain: Not on file    Food insecurity     Worry: Not on file     Inability: Not on file    Transportation needs     Medical: Not on file     Non-medical: Not on file   Tobacco Use    Smoking status: Never Smoker    Smokeless tobacco: Never Used   Substance and Sexual Activity    Alcohol use: Yes     Comment: Occassional use    Drug use: Not on file     Comment: Occassional use    Sexual activity: Yes     Partners: Female     Birth control/protection: None   Lifestyle    Physical activity     Days per week: Not on file     Minutes per session: Not on file    Stress: Not on file   Relationships    Social connections     Talks on phone: Not on file     Gets together: Not on file     Attends Latter-day service: Not on file     Active member of club or organization: Not on file     Attends meetings of clubs or organizations: Not on file     Relationship status: Not on file    Intimate partner violence     Fear of current or ex partner: Not on file     Emotionally abused: Not on file     Physically abused: Not on file     Forced sexual activity: Not on file   Other Topics Concern    Not on file   Social History Narrative    Not on file       Allergies   Allergen Reactions    Pcn [Penicillins] Other (comments)     Unknown was told when a child Family History   Problem Relation Age of Onset    Cancer Mother         unknown ? breast    Heart Disease Father     No Known Problems Sister     Cancer Brother     No Known Problems Sister     No Known Problems Brother     No Known Problems Brother     Diabetes Daughter     Diabetes Daughter        Current Outpatient Medications   Medication Sig Dispense Refill    lisinopriL (PRINIVIL, ZESTRIL) 30 mg tablet TAKE 1 TABLET BY MOUTH EVERY DAY 90 Tab 0    insulin glargine (Basaglar KwikPen U-100 Insulin) 100 unit/mL (3 mL) inpn INJECT 80 UNITS BY SUBCUTANEOUS ROUTE EVERY EVENING. 15 Adjustable Dose Pre-filled Pen Syringe 3    rosuvastatin (CRESTOR) 20 mg tablet TAKE 1 TABLET BY MOUTH EVERY DAY AT NIGHT 90 Tab 0    sildenafil citrate (VIAGRA) 100 mg tablet Take 1 Tab by mouth as needed (45 mins prior to intercourse). 10 Tab 1    Insulin Needles, Disposable, (BD Ultra-Fine Short Pen Needle) 31 gauge x 5/16\" ndle Inject 80 units by subcutaneous route every night. 1 Package 11    dulaglutide (Trulicity) 1.5 YQ/2.8 mL sub-q pen 0.5 mL by SubCUTAneous route every seven (7) days. 6 mL 3    empagliflozin (Jardiance) 25 mg tablet Take 1 Tab by mouth daily. 90 Tab 1    Blood Pressure Kit-Extra Large kit Take blood pressure as directed 1 Each 0    flash glucose sensor (Bitstampyle Jarad 14 Day Sensor) kit Apply new sensor every 14 days to monitor blood sugar as directed. Please dispense 2 sensors. 2 Kit 11    Blood-Glucose Transmitter (Dexcom G6 Transmitter) eliseo Use with Dexcom CGM to check blood sugar 4 or more times daily. 1 Device 2    Blood-Glucose Sensor (Dexcom G6 Sensor) eliseo Use to check blood sugar 4 or more times daily. 3 Device 11    Blood-Glucose Meter,Continuous (DEXCOM ) misc Use to check blood sugar 4 or more times daily. 1 Each 1    metFORMIN (GLUCOPHAGE) 1,000 mg tablet Take 1 Tab by mouth two (2) times daily (with meals).  60 Tab 3    flash glucose scanning reader (FREESTYLE GERALD 14 DAY READER) misc Use to monitor blood sugar at least 3 times daily 1 Each 0    multivitamin (ONE A DAY) tablet Take 1 Tab by mouth daily. Visit Vitals  /80   Pulse 80   Temp 98 °F (36.7 °C)   Resp 15   Ht 5' 10\" (1.778 m)   Wt 312 lb (141.5 kg)   SpO2 98%   BMI 44.77 kg/m²       PE  Heavy in NAD  HEENT:  OP: clear. Neck: supple  Chest: clear. CV: RRR w/o m,r,g  Abd: soft, NT  Ext: tr edema. +(R) paravertebral thoracic tenderness; neg SLR; no rash  Neuro: NF. Assessment and Plan    Encounter Diagnoses   Name Primary?     Acute right-sided thoracic back pain Yes     MSK back pain  Sx limited activity until better; heat as needed  Tylenol prn; flexeril 10 mg tid prn (no driving if using); avoid NSAIDs d/t CKD  F/U worsening or unimproved 7 days  OV 2-3 mos or prn  I have explained plan to patient and the patient verbalizes understanding

## 2020-10-22 ENCOUNTER — OFFICE VISIT (OUTPATIENT)
Dept: FAMILY MEDICINE CLINIC | Age: 50
End: 2020-10-22
Payer: COMMERCIAL

## 2020-10-22 VITALS
RESPIRATION RATE: 15 BRPM | OXYGEN SATURATION: 98 % | SYSTOLIC BLOOD PRESSURE: 134 MMHG | HEART RATE: 80 BPM | HEIGHT: 70 IN | BODY MASS INDEX: 44.67 KG/M2 | DIASTOLIC BLOOD PRESSURE: 80 MMHG | TEMPERATURE: 98 F | WEIGHT: 312 LBS

## 2020-10-22 DIAGNOSIS — M54.6 ACUTE RIGHT-SIDED THORACIC BACK PAIN: Primary | ICD-10-CM

## 2020-10-22 PROCEDURE — 99213 OFFICE O/P EST LOW 20 MIN: CPT | Performed by: INTERNAL MEDICINE

## 2020-10-22 RX ORDER — CYCLOBENZAPRINE HCL 10 MG
TABLET ORAL
Qty: 21 TAB | Refills: 0 | Status: SHIPPED | OUTPATIENT
Start: 2020-10-22 | End: 2021-09-22 | Stop reason: SDUPTHER

## 2020-10-23 ENCOUNTER — PATIENT MESSAGE (OUTPATIENT)
Dept: FAMILY MEDICINE CLINIC | Age: 50
End: 2020-10-23

## 2020-10-28 RX ORDER — ROSUVASTATIN CALCIUM 20 MG/1
TABLET, COATED ORAL
Qty: 90 TAB | Refills: 0 | Status: SHIPPED | OUTPATIENT
Start: 2020-10-28 | End: 2021-09-22 | Stop reason: SDUPTHER

## 2020-10-28 RX ORDER — EMPAGLIFLOZIN 25 MG/1
TABLET, FILM COATED ORAL
Qty: 90 TAB | Refills: 1 | Status: SHIPPED | OUTPATIENT
Start: 2020-10-28 | End: 2021-08-12

## 2020-11-23 ENCOUNTER — TELEPHONE (OUTPATIENT)
Dept: FAMILY MEDICINE CLINIC | Age: 50
End: 2020-11-23

## 2020-11-23 NOTE — TELEPHONE ENCOUNTER
kathrynaura needs a return to work note his job states he needed one from last month , also stated that the pain is still there.       Please advise patient

## 2020-12-01 ENCOUNTER — VIRTUAL VISIT (OUTPATIENT)
Dept: FAMILY MEDICINE CLINIC | Age: 50
End: 2020-12-01

## 2020-12-01 DIAGNOSIS — Z91.199 NO-SHOW FOR APPOINTMENT: Primary | ICD-10-CM

## 2020-12-02 ENCOUNTER — HOSPITAL ENCOUNTER (OUTPATIENT)
Dept: PHYSICAL THERAPY | Age: 50
Discharge: HOME | End: 2020-12-02
Payer: COMMERCIAL

## 2020-12-02 PROCEDURE — 97162 PT EVAL MOD COMPLEX 30 MIN: CPT

## 2020-12-02 PROCEDURE — 97140 MANUAL THERAPY 1/> REGIONS: CPT

## 2020-12-02 PROCEDURE — 97535 SELF CARE MNGMENT TRAINING: CPT

## 2020-12-02 PROCEDURE — 97110 THERAPEUTIC EXERCISES: CPT

## 2020-12-02 NOTE — PROGRESS NOTES
[facility]  [address]  [place], [address]  [phone] fax    Plan of Care/ Statement of Necessity for Physical Therapy Services    Patient name: Sirisha Simmons Start of Care: 2020   Referral source: Adele Gamez [place] : 1970    Medical Diagnosis: Back pain [M54.9]  Payor: BLUE CROSS / Plan: [address] [place] [address] / Product Type: PPO /  Onset Date:1 month ago    Treatment Diagnosis: right shoulder pain; thoracic pain   Prior Hospitalization: see medical history Provider#: [id number]   Medications: Verified on Patient summary List    Comorbidities: diabetes, sleep apnea, high blood pressure   Prior Level of Function: performs work activities without shoulder pain; prior history of LBP     The Plan of Care and following information is based on the information from the initial evaluation. Assessment/ key information: Pt is a 71-year-old male presenting to the clinic with right scapular pain and thoracic pain beginning insidiously 1 month ago. Pt works a job cleaning floors and carpets, requiring pushing and pulling cleaning machines. Pt reports constant aching pains with intermittent sharp pains \"underneath the shoulder\" and \"sometimes in the front of my body,\" indicating the right anterolateral ribs. Denies numbness, tingling, or burning. Reports pain when in quadruped and when pushing down with the right arm when turning in bed. Increased pain with right sidelying. Palpation finds increased muscular tightness of the medial and lateral scapular border, tender subscapularis, and pain with thoracic PA oscillations to T4-8 and with rib springs. C/o sharp pain when asked to activate serratus anterior with forward scapular protraction. Impairments include decreased scapular retraction ROM, pain with end range GH flexion and ABD, tenderness with functional ER and IR, decreased scapular strength, and impaired ease of ADLs.  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 Patient will benefit from skilled PT services to modify and progress therapeutic interventions, address functional mobility deficits, address ROM deficits, address strength deficits, analyze and address soft tissue restrictions, analyze and cue movement patterns, analyze and modify body mechanics/ergonomics, assess and modify postural abnormalities and instruct in home and community integration to attain remaining goals. Evaluation Complexity History MEDIUM  Complexity : 1-2 comorbidities / personal factors will impact the outcome/ POC ; Examination MEDIUM Complexity : 3 Standardized tests and measures addressing body structure, function, activity limitation and / or participation in recreation  ;Presentation MEDIUM Complexity : Evolving with changing characteristics  ; Clinical Decision Making MEDIUM Complexity : FOTO score of 26-74  Overall Complexity Rating: MEDIUM  Problem List: pain affecting function, decrease ROM, decrease strength, decrease ADL/ functional abilitiies, decrease activity tolerance, decrease flexibility/ joint mobility and decrease transfer abilities   Treatment Plan may include any combination of the following: Therapeutic exercise, Therapeutic activities, Neuromuscular re-education, Physical agent/modality, Manual therapy, Patient education, Self Care training and Functional mobility training  Patient / Family readiness to learn indicated by: asking questions, trying to perform skills and interest  Persons(s) to be included in education: patient (P)  Barriers to Learning/Limitations: None  Patient Goal (s): pain relief, correction/fixing the problem if possible  Patient Self Reported Health Status: fair  Rehabilitation Potential: good    Short Term Goals: To be accomplished in 1 weeks:  1. Pt will report compliance with HEP to maximize therapeutic success. Long Term Goals: To be accomplished in 4 weeks:  1.  Pt will improve FOTO to 61%, demonstrating improved functional capacity for ADLs. 2. Pt will perform scapular protraction in seated without pain, demonstrating improved capacity for return to work. 3. Pt will perform thoracic extension to 50% or better without pain increase to improve ease of ADL performance. 4. Pt will perform middle and lower trapezius MMT with grade 4/5 strength to improve ease of work performance. Frequency / Duration: Patient to be seen 2 times per week for 4 weeks. Patient/ Caregiver education and instruction: Diagnosis, prognosis, self care, activity modification and exercises   [x]  Plan of care has been reviewed with ARMINDA Shirley PT 12/2/2020 2:13 PM    ________________________________________________________________________    I certify that the above Therapy Services are being furnished while the patient is under my care. I agree with the treatment plan and certify that this therapy is necessary.     Physician's Signature:____________Date:_________TIME:________    ** Signature, Date and Time must be completed for valid certification **    Please sign and return to [facility] [address] [place] [address] & [facility]  [address] [place] [address], [address] [place] [address] Str.  [phone] fax

## 2020-12-02 NOTE — PROGRESS NOTES
PT DAILY TREATMENT NOTE     Patient Name: Paulo Wiley. Date:2020  : 1970  [x]  Patient  Verified  Payor: BLUE CROSS / Plan: [address] [place] [address] / Product Type: PPO /    In time:1030  Out time:1118  Total Treatment Time (min): 48  Visit #: 1 of 8    Medicare/BCBS Only   Total Timed Codes (min):  10 1:1 Treatment Time:  38       Treatment Area: Back pain [M54.9]    SUBJECTIVE  Pain Level (0-10 scale): 7  Any medication changes, allergies to medications, adverse drug reactions, diagnosis change, or new procedure performed?: [x] No    [] Yes (see summary sheet for update)  Subjective functional status/changes:   [] No changes reported  Works job cleaning floors and carpets, pushing and pulling . OBJECTIVE    10 min [x]Eval                  []Re-Eval        15 min Therapeutic Exercise:  [] See flow sheet : HEP education and performance with modifications   Rationale: increase ROM, increase strength and improve coordination to improve the patients ability to improve ease and capacity for functional activities. 15 min Self Care/Home Management:  []  See flow sheet : Education of relevant anatomy and pathology of the scapula, serratus anterior, and subscapularis including functions and shoulder motions to use caution with as tissue heals. Advised use of ice packs for pain relief. Rationale: decrease pain and improve understanding  to improve the patients ability to perform self care. 8 min Manual Therapy:  Left S/L -- DTM to medial scapular border, subscapularis, STM to serratus at lateral scapular border; pt prone with towel under head -- GR2 thoracic PA oscillations and rib springs. The manual therapy interventions were performed at a separate and distinct time from the therapeutic activities interventions. Rationale: decrease pain, increase ROM and increase tissue extensibility to improve ease and capacity for ADLs.           With   [x] TE   [] TA   [] neuro   [] other: Patient Education: [x] Review HEP    [] Progressed/Changed HEP based on:   [] positioning   [] body mechanics   [] transfers   [] heat/ice application    [] other:      Other Objective/Functional Measures: see paper eval and MORTEZA     Pain Level (0-10 scale) post treatment: 6-7    ASSESSMENT/Changes in Function: Pt is a 81-year-old male presenting to the clinic with right scapular pain and thoracic pain beginning insidiously 1 month ago. Pt works a job cleaning floors and carpets, requiring pushing and pulling cleaning machines. Pt reports constant aching pains with intermittent sharp pains \"underneath the shoulder\" and \"sometimes in the front of my body,\" indicating the right anterolateral ribs. Denies numbness, tingling, or burning. Reports pain when in quadruped and when pushing down with the right arm when turning in bed. Increased pain with right sidelying. Palpation finds increased muscular tightness of the medial and lateral scapular border, tender subscapularis, and pain with thoracic PA oscillations to T4-8 and with rib springs. C/o sharp pain when asked to activate serratus anterior with forward scapular protraction. Impairments include decreased scapular retraction ROM, pain with end range GH flexion and ABD, tenderness with functional ER and IR, decreased scapular strength, and impaired ease of ADLs. 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 Patient will benefit from skilled PT services to modify and progress therapeutic interventions, address functional mobility deficits, address ROM deficits, address strength deficits, analyze and address soft tissue restrictions, analyze and cue movement patterns, analyze and modify body mechanics/ergonomics, assess and modify postural abnormalities and instruct in home and community integration to attain remaining goals.      [x]  See Plan of Care  []  See progress note/recertification  []  See Discharge Summary         Progress towards goals / Updated goals:  Short Term Goals: To be accomplished in 1 weeks:  1. Pt will report compliance with HEP to maximize therapeutic success. Eval: HEP assigned  Long Term Goals: To be accomplished in 4 weeks:  1. Pt will improve FOTO to 61%, demonstrating improved functional capacity for ADLs. Eval: 50%  2. Pt will perform scapular protraction in seated without pain, demonstrating improved capacity for return to work. Eval: seated scapular protraction with 90 degrees GH flexion causes sharp pain  3. Pt will perform thoracic extension to 50% or better without pain increase to improve ease of ADL performance. Eval: increased pain, 50%  4. Pt will perform middle and lower trapezius MMT with grade 4/5 strength to improve ease of work performance.    Eval: 3+/5 with decreased ease of scapular retraction      PLAN  []  Upgrade activities as tolerated     [x]  Continue plan of care  []  Update interventions per flow sheet       []  Discharge due to:_  []  Other:_      Karyn Null, PT 12/2/2020  11:30 AM    Future Appointments   Date Time Provider Balta Andrea   12/9/2020  3:45 PM Leti Leandro, PTA [facility]   12/11/2020  3:45 PM Esdras Lacho, PTA [facility]   12/14/2020  4:30 PM Esdras Lacho, PTA [facility]   12/17/2020  4:30 PM Merline Card, PT [facility]   12/21/2020  3:45 PM Esdras Lacho, PTA [facility]   12/23/2020  3:45 PM Leti Reeves, PTA [facility]   12/29/2020  3:45 PM Shantell Kirkland, PT [facility]   12/31/2020  3:15 PM Leti Leandro, PTA [facility]

## 2020-12-09 ENCOUNTER — HOSPITAL ENCOUNTER (OUTPATIENT)
Dept: PHYSICAL THERAPY | Age: 50
Discharge: HOME | End: 2020-12-09
Payer: COMMERCIAL

## 2020-12-09 PROCEDURE — 97112 NEUROMUSCULAR REEDUCATION: CPT

## 2020-12-09 PROCEDURE — 97110 THERAPEUTIC EXERCISES: CPT

## 2020-12-09 PROCEDURE — 97140 MANUAL THERAPY 1/> REGIONS: CPT

## 2020-12-09 NOTE — PROGRESS NOTES
PT DAILY TREATMENT NOTE     Patient Name: Alston Mortimer. Date:2020   : 1970  [x]  Patient  Verified  Payor: Carolann Shanta / Plan: [address] [place] [address] / Product Type: PPO /    In time:3:47  Out time:4:40  Total Treatment Time (min): 53  Visit #: 2 of 8    Medicare/BCBS Only   Total Timed Codes (min):  43 1:1 Treatment Time:  43       Treatment Area: Back pain [M54.9]    SUBJECTIVE  Pain Level (0-10 scale): 3/10  Any medication changes, allergies to medications, adverse drug reactions, diagnosis change, or new procedure performed?: [x] No    [] Yes (see summary sheet for update)  Subjective functional status/changes:   [x] No changes reported    OBJECTIVE    Modality rationale: decrease pain to improve the patients ability to perform ADL's.    Min Type Additional Details    [] Estim:  []Unatt       []IFC  []Premod                        []Other:  []w/ice   []w/heat  Position:  Location:    [] Estim: []Att    []TENS instruct  []NMES                    []Other:  []w/US   []w/ice   []w/heat  Position:  Location:    []  Traction: [] Cervical       []Lumbar                       [] Prone          []Supine                       []Intermittent   []Continuous Lbs:  [] before manual  [] after manual    []  Ultrasound: []Continuous   [] Pulsed                           []1MHz   []3MHz W/cm2:  Location:    []  Iontophoresis with dexamethasone         Location: [] Take home patch   [] In clinic   10 [x]  Ice     []  heat  []  Ice massage  []  Laser   []  Anodyne Position: Left side-lying  Location: Right thoracic    []  Laser with stim  []  Other:  Position:  Location:    []  Vasopneumatic Device Pressure:       [] lo [] med [] hi   Temperature: [] lo [] med [] hi   [] Skin assessment post-treatment:  []intact []redness- no adverse reaction    []redness - adverse reaction:     27 min Therapeutic Exercise:  [x] See flow sheet :   Rationale: increase ROM and increase strength to improve the patients ability to perform ADL's.    8 min Neuromuscular Re-education:  [x]  See flow sheet : Scapular stability exercises. Rationale: increase strength, improve coordination and increase proprioception  to improve the patients ability to perform functional tasks. 8 min Manual Therapy:  T/S PA's and UPA's, rib springs grade III. DTM/TPR Right UT, rhomboids, subscap. Pt prone. The manual therapy interventions were performed at a separate and distinct time from the therapeutic activities interventions. Rationale: decrease pain, increase ROM, increase tissue extensibility and decrease trigger points to improve ease of performing functional activities. With   [x] TE   [] TA   [] neuro   [] other: Patient Education: [x] Review HEP    [] Progressed/Changed HEP based on:   [] positioning   [] body mechanics   [] transfers   [] heat/ice application    [] other:      Other Objective/Functional Measures: Initiated exercises per flow sheet. Poor T/S mobility. Pain increased while performing side-lying thoracic open books, expressed sharp pain with AROM after performing open books. Pain subsided quickly per pt. Pain Level (0-10 scale) post treatment: 4-5/10    ASSESSMENT/Changes in Function: First F/U visit. Pt fully participated in treatment. Several moments of sharp pain which quickly subsided per pt. Patient will continue to benefit from skilled PT services to modify and progress therapeutic interventions, address functional mobility deficits, address ROM deficits, address strength deficits, analyze and address soft tissue restrictions and analyze and modify body mechanics/ergonomics to attain remaining goals. [x]  See Plan of Care  []  See progress note/recertification  []  See Discharge Summary         Progress towards goals / Updated goals:  Short Term Goals: To be accomplished in 1 weeks:  1. Pt will report compliance with HEP to maximize therapeutic success.               Eval: HEP assigned   Current: Partial HEP compliance. 12/9/2020  Long Term Goals: To be accomplished in 4 weeks:  1. Pt will improve FOTO to 61%, demonstrating improved functional capacity for ADLs. Eval: 50%  2. Pt will perform scapular protraction in seated without pain, demonstrating improved capacity for return to work. Eval: seated scapular protraction with 90 degrees GH flexion causes sharp pain  3. Pt will perform thoracic extension to 50% or better without pain increase to improve ease of ADL performance. Eval: increased pain, 50%  4. Pt will perform middle and lower trapezius MMT with grade 4/5 strength to improve ease of work performance.               Eval: 3+/5 with decreased ease of scapular retraction    PLAN  []  Upgrade activities as tolerated     [x]  Continue plan of care  []  Update interventions per flow sheet       []  Discharge due to:_  []  Other:_      Jose Low PTA 12/9/2020  3:46 PM    Future Appointments   Date Time Provider Balta Ramanisti   12/11/2020  3:45 PM Richard James, PTA [facility]   12/14/2020  4:30 PM Richard James, PTA [facility]   12/17/2020  4:30 PM Jackelin Patch, PT [facility]   12/21/2020  3:45 PM Richard James, PTA [facility]   12/23/2020  3:45 PM Alveda Kos, PTA [facility]   12/29/2020  3:45 PM Darnell Aquino, PT [facility]   12/31/2020  3:15 PM Alveda Kos, PTA [facility]

## 2020-12-11 ENCOUNTER — HOSPITAL ENCOUNTER (OUTPATIENT)
Dept: PHYSICAL THERAPY | Age: 50
Discharge: HOME | End: 2020-12-11
Payer: COMMERCIAL

## 2020-12-11 ENCOUNTER — TRANSCRIBE ORDER (OUTPATIENT)
Dept: SCHEDULING | Age: 50
End: 2020-12-11

## 2020-12-11 DIAGNOSIS — E11.52 DIABETIC GANGRENE (HCC): Primary | ICD-10-CM

## 2020-12-11 PROCEDURE — 97110 THERAPEUTIC EXERCISES: CPT

## 2020-12-11 PROCEDURE — 97140 MANUAL THERAPY 1/> REGIONS: CPT

## 2020-12-11 NOTE — PROGRESS NOTES
PT DAILY TREATMENT NOTE     Patient Name: Pawan Spears Date:2020  : 1970  [x]  Patient  Verified  Payor: Pepe Schreiber / Plan: [address] [place] [address] / Product Type: PPO /    In time:4:00  Out time:4:46  Total Treatment Time (min): 55  Visit #: 3 of 8    Medicare/BCBS Only   Total Timed Codes (min):  36 1:1 Treatment Time:  30       Treatment Area: Back pain [M54.9]    SUBJECTIVE  Pain Level (0-10 scale): 3  Any medication changes, allergies to medications, adverse drug reactions, diagnosis change, or new procedure performed?: [x] No    [] Yes (see summary sheet for update)  Subjective functional status/changes:   [] No changes reported  Pt reprots he feels most of his pain in his upper back under his right shoulder blade. OBJECTIVE    Modality rationale: decrease pain and increase tissue extensibility to improve the patients ability to perform ADL's with ease.    Min Type Additional Details    [] Estim:  []Unatt       []IFC  []Premod                        []Other:  []w/ice   []w/heat  Position:  Location:    [] Estim: []Att    []TENS instruct  []NMES                    []Other:  []w/US   []w/ice   []w/heat  Position:  Location:    []  Traction: [] Cervical       []Lumbar                       [] Prone          []Supine                       []Intermittent   []Continuous Lbs:  [] before manual  [] after manual    []  Ultrasound: []Continuous   [] Pulsed                           []1MHz   []3MHz W/cm2:  Location:    []  Iontophoresis with dexamethasone         Location: [] Take home patch   [] In clinic   10 []  Ice     [x]  heat  []  Ice massage  []  Laser   []  Anodyne Position:supine with wedge  Location: right T/S    []  Laser with stim  []  Other:  Position:  Location:    []  Vasopneumatic Device Pressure:       [] lo [] med [] hi   Temperature: [] lo [] med [] hi   [] Skin assessment post-treatment:  []intact []redness- no adverse reaction    []redness - adverse reaction: 26 min Therapeutic Exercise:  [x] See flow sheet :   Rationale: increase ROM, increase strength and improve coordination to improve the patients ability to improve functional task with ease. 10 min Manual Therapy:  T/S PA's and UPA's, rib springs grade III. DTM/TPR Right UT, rhomboids, subscap. Pt prone. The manual therapy interventions were performed at a separate and distinct time from the therapeutic activities interventions. Rationale: decrease pain, increase ROM, increase tissue extensibility and decrease trigger points to improve functional mobility with ADL's. With   [] TE   [] TA   [] neuro   [] other: Patient Education: [x] Review HEP    [] Progressed/Changed HEP based on:   [] positioning   [] body mechanics   [] transfers   [] heat/ice application    [] other:      Other Objective/Functional Measures:   Thread the needle- 10x ea     Pain Level (0-10 scale) post treatment: 7    ASSESSMENT/Changes in Function:   decreased T/S mobility due to pain at the inferior border of the right scap. Several instances of mm spasm in the T/S throughout therex requiring pt to take a rest break to let mm spasm subside. Trp's and mm restrictions along the right scap. Thread the needle added to continue to improve right t/s and scapular mobility. Pt reports increased pain post treatment but left in no apparent distress. Patient will continue to benefit from skilled PT services to modify and progress therapeutic interventions, address functional mobility deficits, address ROM deficits, address strength deficits, analyze and address soft tissue restrictions, analyze and cue movement patterns, analyze and modify body mechanics/ergonomics and assess and modify postural abnormalities to attain remaining goals. [x]  See Plan of Care  []  See progress note/recertification  []  See Discharge Summary         Progress towards goals / Updated goals:  1.  Pt will report compliance with HEP to maximize therapeutic success.              Eval: HEP assigned              Current: Partial HEP compliance. 12/9/2020  Long Term Goals: To be accomplished in 4 weeks:  1. Pt will improve FOTO to 61%, demonstrating improved functional capacity for ADLs.             Eval: 50%  2. Pt will perform scapular protraction in seated without pain, demonstrating improved capacity for return to work.  [place] Ramming: seated scapular protraction with 90 degrees GH flexion causes sharp pain  3. Pt will perform thoracic extension to 50% or better without pain increase to improve ease of ADL performance.              Eval: increased pain, 50%  4.  Pt will perform middle and lower trapezius MMT with grade 4/5 strength to improve ease of work performance.  [place] Ramming: 3+/5 with decreased ease of scapular retraction       PLAN  []  Upgrade activities as tolerated     [x]  Continue plan of care  []  Update interventions per flow sheet       []  Discharge due to:_  []  Other:_      Brenda Walter, PTA 12/11/2020  4:00 PM    Future Appointments   Date Time Provider Balta Andrea   12/14/2020  4:30 PM Addy Favorite, PTA [facility]   12/17/2020  4:30 PM Melissa Garner, PT [facility]   12/21/2020  3:45 PM Addy Quitaite, PTA [facility]   12/23/2020  3:45 PM Etter Eisenmenger, PTA [facility]   12/29/2020  3:45 PM Sukhwinder De León, PT [facility]   12/31/2020  3:15 PM Etter Eisenmenger, PTA [facility]

## 2020-12-14 ENCOUNTER — APPOINTMENT (OUTPATIENT)
Dept: PHYSICAL THERAPY | Age: 50
End: 2020-12-14
Payer: COMMERCIAL

## 2020-12-15 ENCOUNTER — HOSPITAL ENCOUNTER (OUTPATIENT)
Dept: PHYSICAL THERAPY | Age: 50
Discharge: HOME | End: 2020-12-15
Payer: COMMERCIAL

## 2020-12-15 PROCEDURE — 97110 THERAPEUTIC EXERCISES: CPT

## 2020-12-15 PROCEDURE — 97140 MANUAL THERAPY 1/> REGIONS: CPT

## 2020-12-15 NOTE — PROGRESS NOTES
PT DAILY TREATMENT NOTE     Patient Name: Saira Palma. Date:12/15/2020  : 1970  [x]  Patient  Verified  Payor: BLUE CROSS / Plan: [address] [place] [address] / Product Type: PPO /    In time:3:52  Out time:4:45  Total Treatment Time (min): 53  Visit #: 4 of 8    Medicare/BCBS Only   Total Timed Codes (min):  28 1:1 Treatment Time:  28       Treatment Area: Back pain [M54.9]    SUBJECTIVE  Pain Level (0-10 scale): 7/10  Any medication changes, allergies to medications, adverse drug reactions, diagnosis change, or new procedure performed?: [x] No    [] Yes (see summary sheet for update)  Subjective functional status/changes:   [] No changes reported  The patient reports that his pain is elevated today and doesn't know why. He does state that his pain has not been as consistent since beginning PT.     OBJECTIVE  18 min Therapeutic Exercise:  [x] See flow sheet :   Rationale: increase ROM and increase strength to improve the patients ability to improve ADL ease. 10 min Manual Therapy:  Thoracic PAs, TPR of rhomboid, rib springing   The manual therapy interventions were performed at a separate and distinct time from the therapeutic activities interventions. Rationale: decrease pain, increase ROM and increase tissue extensibility to improve ADL ease. With   [] TE   [] TA   [] neuro   [] other: Patient Education: [x] Review HEP    [] Progressed/Changed HEP based on:   [] positioning   [] body mechanics   [] transfers   [] heat/ice application    [] other:      Other Objective/Functional Measures: The patient reported having more pain following open books. Added side-bending to left with the patient reporting no pain during stretch, but did have pain coming out of position.     Pain Level (0-10 scale) post treatment: 7/10    ASSESSMENT/Changes in Function: He stated he had increased pain and PT opted to hold further strengthening despite patient requesting to continue with interventions. Given the patient's pain level, PT thought it best to complete session with ice. The patient did agree following this discussion and he left in no greater pain than arrival post modality. Patient will continue to benefit from skilled PT services to modify and progress therapeutic interventions, address functional mobility deficits, address ROM deficits, address strength deficits, analyze and address soft tissue restrictions, analyze and cue movement patterns, analyze and modify body mechanics/ergonomics, assess and modify postural abnormalities and instruct in home and community integration to attain remaining goals. []  See Plan of Care  []  See progress note/recertification  []  See Discharge Summary           Progress towards goals / Updated goals:  1. Pt will report compliance with HEP to maximize therapeutic success.              Eval: HEP assigned              Current: Partial HEP compliance. 12/9/2020  Long Term Goals: To be accomplished in 4 weeks:  1. Pt will improve FOTO to 61%, demonstrating improved functional capacity for ADLs.             Eval: 50%  2. Pt will perform scapular protraction in seated without pain, demonstrating improved capacity for return to work.  Lonny Riggs: seated scapular protraction with 90 degrees GH flexion causes sharp pain  3. Pt will perform thoracic extension to 50% or better without pain increase to improve ease of ADL performance.              Eval: increased pain, 50%  4.  Pt will perform middle and lower trapezius MMT with grade 4/5 strength to improve ease of work performance.  Lonny Riggs: 3+/5 with decreased ease of scapular retraction    PLAN  []  Upgrade activities as tolerated     [x]  Continue plan of care  []  Update interventions per flow sheet       []  Discharge due to:_  []  Other:_      Peng Babcock, PT 12/15/2020  4:00 PM    Future Appointments   Date Time Provider Balta Andrea   12/17/2020  4:30 PM Nick Lashay Ortega, PT [facility]   12/21/2020  9:00 AM [facility]   12/21/2020  3:45 PM Amy Nice, PTA [facility]   12/23/2020  3:45 PM Santino Moore, PTA [facility]   12/29/2020  3:45 PM Tosin Reid, PT [facility]   12/31/2020  3:15 PM Susan Sharp, PTA [facility]

## 2020-12-17 ENCOUNTER — HOSPITAL ENCOUNTER (OUTPATIENT)
Dept: PHYSICAL THERAPY | Age: 50
Discharge: HOME | End: 2020-12-17
Payer: COMMERCIAL

## 2020-12-17 PROCEDURE — 97110 THERAPEUTIC EXERCISES: CPT

## 2020-12-17 PROCEDURE — 97140 MANUAL THERAPY 1/> REGIONS: CPT

## 2020-12-17 NOTE — PROGRESS NOTES
PT DAILY TREATMENT NOTE     Patient Name: Sirisha Jiménez. Date:2020  : 1970  [x]  Patient  Verified  Payor: BLUE CROSS / Plan: [address] [place] [address] / Product Type: PPO /    In time:4:32  Out time:5:21  Total Treatment Time (min): 49  Visit #: 5 of 8    Medicare/BCBS Only   Total Timed Codes (min):  39 1:1 Treatment Time:  39     Treatment Area: Back pain [M54.9]    SUBJECTIVE  Pain Level (0-10 scale): 4/10  Any medication changes, allergies to medications, adverse drug reactions, diagnosis change, or new procedure performed?: [x] No    [] Yes (see summary sheet for update)  Subjective functional status/changes:   [] No changes reported  The patient indicates that his pain was worse after last session but has since improved. He does indicate that the pain meds have helped as well. OBJECTIVE  Modality rationale: decrease edema, decrease inflammation and decrease pain to improve the patients ability to improve ADL ease. Min Type Additional Details   10 [x]  Ice     []  heat  []  Ice massage  []  Laser   []  Anodyne Position: seated  Location: thoracic spine   [] Skin assessment post-treatment:  []intact []redness- no adverse reaction    []redness - adverse reaction:     29 min Therapeutic Exercise:  [x] See flow sheet :   Rationale: increase ROM and increase strength to improve the patients ability to improve ADL ease. 10 min Manual Therapy:  Ribs 5 -10 PAs at grade III to IV. scap mobs with the patient in left sidelying. Clocks/circles. The manual therapy interventions were performed at a separate and distinct time from the therapeutic activities interventions. Rationale: decrease pain, increase ROM and increase tissue extensibility to improve ADL ease.          With   [] TE   [] TA   [] neuro   [] other: Patient Education: [x] Review HEP    [] Progressed/Changed HEP based on:   [] positioning   [] body mechanics   [] transfers   [] heat/ice application    [] other: Other Objective/Functional Measures:   Thoracic extension ROM: 75% WNL mild to moderate pain      Pain Level (0-10 scale) post treatment: 2/10    ASSESSMENT/Changes in Function: The patient has improved with regards to thoracic extension and overall mobility. His pain levels fluctuate. He did indicate he had more pain after last session, but has calmed down and he does improved     Patient will continue to benefit from skilled PT services to modify and progress therapeutic interventions, address functional mobility deficits, address ROM deficits, address strength deficits, analyze and address soft tissue restrictions, analyze and cue movement patterns, analyze and modify body mechanics/ergonomics, assess and modify postural abnormalities and instruct in home and community integration to attain remaining goals. []  See Plan of Care  []  See progress note/recertification  []  See Discharge Summary         Progress towards goals / Updated goals:  1. Pt will report compliance with HEP to maximize therapeutic success.              Eval: HEP assigned              Current: Partial HEP compliance. 12/9/2020  Long Term Goals: To be accomplished in 4 weeks:  1. Pt will improve FOTO to 61%, demonstrating improved functional capacity for ADLs.             Eval: 50%  2. Pt will perform scapular protraction in seated without pain, demonstrating improved capacity for return to work.  Marlene Rodes: seated scapular protraction with 90 degrees GH flexion causes sharp pain  3. Pt will perform thoracic extension to 50% or better without pain increase to improve ease of ADL performance.              Eval: increased pain, 50%   Current: 75% of WNL min to moderate pain 12/17/2020  4.  Pt will perform middle and lower trapezius MMT with grade 4/5 strength to improve ease of work performance.  Marlene Rodes: 3+/5 with decreased ease of scapular retraction    PLAN  []  Upgrade activities as tolerated     [x]  Continue plan of care  []  Update interventions per flow sheet       []  Discharge due to:_  []  Other:_      Darwin Mckeon, PT 12/17/2020  4:39 PM    Future Appointments   Date Time Provider Balta Andrea   12/21/2020  9:00 AM [facility]   12/21/2020  3:45 PM Genia Beck, PTA [facility]   12/23/2020  3:45 PM Jim Pearson, PTA [facility]   12/29/2020  3:45 PM Nat Shaikh, PT [facility]   12/31/2020  3:15 PM Maximilian Sharp, PTA [facility]

## 2020-12-21 ENCOUNTER — HOSPITAL ENCOUNTER (OUTPATIENT)
Dept: VASCULAR SURGERY | Age: 50
Discharge: HOME | End: 2020-12-21
Attending: PODIATRIST
Payer: COMMERCIAL

## 2020-12-21 ENCOUNTER — HOSPITAL ENCOUNTER (OUTPATIENT)
Dept: PHYSICAL THERAPY | Age: 50
Discharge: HOME | End: 2020-12-21
Payer: COMMERCIAL

## 2020-12-21 DIAGNOSIS — E11.52 DIABETIC GANGRENE (HCC): ICD-10-CM

## 2020-12-21 LAB
LEFT ABI: 1.25
LEFT ANTERIOR TIBIAL: 136 MMHG
LEFT ARM BP: 131 MMHG
LEFT CALF PRESSURE: 159 MMHG
LEFT POSTERIOR TIBIAL: 164 MMHG
RIGHT ABI: 1.02
RIGHT ANTERIOR TIBIAL: 133 MMHG
RIGHT ARM BP: 127 MMHG
RIGHT CALF PRESSURE: 158 MMHG
RIGHT POSTERIOR TIBIAL: 132 MMHG

## 2020-12-21 PROCEDURE — 97140 MANUAL THERAPY 1/> REGIONS: CPT

## 2020-12-21 PROCEDURE — 97110 THERAPEUTIC EXERCISES: CPT

## 2020-12-21 PROCEDURE — 93923 UPR/LXTR ART STDY 3+ LVLS: CPT

## 2020-12-21 NOTE — PROGRESS NOTES
PT DAILY TREATMENT NOTE     Patient Name: Saira Palma. Date:2020  : 1970  [x]  Patient  Verified  Payor: Justin Pierre / Plan: [address] [place] [address] / Product Type: PPO /    In time:11:18  Out time:12:07  Total Treatment Time (min): 49  Visit #: 6 of 8    Medicare/BCBS Only   Total Timed Codes (min):  39 1:1 Treatment Time:  39       Treatment Area: Back pain [M54.9]    SUBJECTIVE  Pain Level (0-10 scale): 4  Any medication changes, allergies to medications, adverse drug reactions, diagnosis change, or new procedure performed?: [x] No    [] Yes (see summary sheet for update)  Subjective functional status/changes:   [] No changes reported  Pt reports he is doing better with his back pain stating he still has moments of pain with movement but its not as intense as it was when the pain first started. OBJECTIVE    Modality rationale: decrease inflammation and decrease pain to improve the patients ability to perform ADL's with ease.    Min Type Additional Details    [] Estim:  []Unatt       []IFC  []Premod                        []Other:  []w/ice   []w/heat  Position:  Location:    [] Estim: []Att    []TENS instruct  []NMES                    []Other:  []w/US   []w/ice   []w/heat  Position:  Location:    []  Traction: [] Cervical       []Lumbar                       [] Prone          []Supine                       []Intermittent   []Continuous Lbs:  [] before manual  [] after manual    []  Ultrasound: []Continuous   [] Pulsed                           []1MHz   []3MHz W/cm2:  Location:    []  Iontophoresis with dexamethasone         Location: [] Take home patch   [] In clinic   10 [x]  Ice     []  heat  []  Ice massage  []  Laser   []  Anodyne Position: supine  Location: right T/S    []  Laser with stim  []  Other:  Position:  Location:    []  Vasopneumatic Device Pressure:       [] lo [] med [] hi   Temperature: [] lo [] med [] hi   [] Skin assessment post-treatment:  []intact []redness- no adverse reaction    []redness - adverse reaction:       29 min Therapeutic Exercise:  [x] See flow sheet :   Rationale: increase ROM and increase strength to improve the patients ability to perform functional task with ease. 10 min Manual Therapy:  Thoracic Pas in prone. scap mobs with the patient in left sidelying. Clocks/circles   The manual therapy interventions were performed at a separate and distinct time from the therapeutic activities interventions. Rationale: decrease pain, increase ROM, increase tissue extensibility and decrease trigger points to improve overall functional mobility with ADL's. With   [] TE   [] TA   [] neuro   [] other: Patient Education: [x] Review HEP    [] Progressed/Changed HEP based on:   [] positioning   [] body mechanics   [] transfers   [] heat/ice application    [] other:      Other Objective/Functional Measures:      Pain Level (0-10 scale) post treatment: 4    ASSESSMENT/Changes in Function:   Continued mm restrictions in the right T/S with pt however reporting decreased intensity of pain and decreased sensitivity to palpation during manual. Pt continues to report some increased pain with movement but was able to complete therex as prescribed. Increased sharp pain noted with stretching of the right T/S with pt reporting he had decreased pain following modalities but reports increased pain in the right T/S after reaching down for his glasses in the chair before leaving. Pt left in no apparent distress. Patient will continue to benefit from skilled PT services to modify and progress therapeutic interventions, address functional mobility deficits, address ROM deficits, address strength deficits, analyze and address soft tissue restrictions, analyze and cue movement patterns, analyze and modify body mechanics/ergonomics and assess and modify postural abnormalities to attain remaining goals.      [x]  See Plan of Care  []  See progress note/recertification  []  See Discharge Summary         Progress towards goals / Updated goals:  1. Pt will report compliance with HEP to maximize therapeutic success.              Eval: HEP assigned              Current: Partial HEP compliance. 12/9/2020  Long Term Goals: To be accomplished in 4 weeks:  1. Pt will improve FOTO to 61%, demonstrating improved functional capacity for ADLs.             Eval: 50%  2. Pt will perform scapular protraction in seated without pain, demonstrating improved capacity for return to work.  Paullette Laser: seated scapular protraction with 90 degrees GH flexion causes sharp pain   Current: Progressing 12/21/20 Pt reports decreased pain intensity but reports a 3-4/10 pain  3. Pt will perform thoracic extension to 50% or better without pain increase to improve ease of ADL performance.              Eval: increased pain, 50%              Current: 75% of WNL min to moderate pain 12/17/2020  4.  Pt will perform middle and lower trapezius MMT with grade 4/5 strength to improve ease of work performance.  Paullette Laser: 3+/5 with decreased ease of scapular retraction    PLAN  []  Upgrade activities as tolerated     [x]  Continue plan of care  []  Update interventions per flow sheet       []  Discharge due to:_  []  Other:_      Mickey De La Vega PTA 12/21/2020  11:18 AM    Future Appointments   Date Time Provider Balta Andrea   12/23/2020  3:45 PM Kizzy Das PTA [facility]   12/29/2020  3:45 PM Antwon Pretty PT [facility]   12/31/2020  3:15 PM Kizzy Das PTA [facility]

## 2020-12-22 NOTE — TELEPHONE ENCOUNTER
Last Visit: 5/11/20 with MANDO Marquez  Next Appointment: no show VV 12/1/20  Previous Refill Encounter(s): 5/11/20 #2 with 11 refills    Requested Prescriptions     Pending Prescriptions Disp Refills    flash glucose sensor (FreeStyle Jarad 14 Day Sensor) kit 2 Kit 11     Sig: Apply new sensor every 14 days to monitor blood sugar as directed. Please dispense 2 sensors.

## 2020-12-22 NOTE — TELEPHONE ENCOUNTER
Last Visit: 5/11/20 with NP Taryn Streeter  Next Appointment: no show VV 12/1/20  Previous Refill Encounter(s): 9/1/20 #10 with 1 refill    Requested Prescriptions     Pending Prescriptions Disp Refills    sildenafil citrate (VIAGRA) 100 mg tablet 10 Tab 1     Sig: Take 1 Tab by mouth as needed (45 mins prior to intercourse).

## 2020-12-24 RX ORDER — SILDENAFIL 100 MG/1
100 TABLET, FILM COATED ORAL AS NEEDED
Qty: 10 TAB | Refills: 1 | Status: SHIPPED | OUTPATIENT
Start: 2020-12-24 | End: 2021-06-22 | Stop reason: SDUPTHER

## 2020-12-24 RX ORDER — FLASH GLUCOSE SENSOR
KIT MISCELLANEOUS
Qty: 2 KIT | Refills: 11 | Status: SHIPPED | OUTPATIENT
Start: 2020-12-24 | End: 2021-06-22 | Stop reason: SDUPTHER

## 2020-12-29 ENCOUNTER — APPOINTMENT (OUTPATIENT)
Dept: PHYSICAL THERAPY | Age: 50
End: 2020-12-29
Payer: COMMERCIAL

## 2021-01-05 ENCOUNTER — HOSPITAL ENCOUNTER (OUTPATIENT)
Dept: PHYSICAL THERAPY | Age: 51
Discharge: HOME | End: 2021-01-05
Payer: COMMERCIAL

## 2021-01-05 PROCEDURE — 97110 THERAPEUTIC EXERCISES: CPT

## 2021-01-05 PROCEDURE — 97140 MANUAL THERAPY 1/> REGIONS: CPT

## 2021-01-05 NOTE — PROGRESS NOTES
PT DAILY TREATMENT NOTE 11-    Patient Name: Dougie Soto. Date:2021  : 1970  [x]  Patient  Verified  Payor: Luz Marina Sessions / Plan: [address] [place] [address] / Product Type: PPO /    In time:342  Out time:445  Total Treatment Time (min): 63  Visit #: 1 of 4    Medicare/BCBS Only   Total Timed Codes (min):  63 1:1 Treatment Time:  40       Treatment Area: Back pain [M54.9]    SUBJECTIVE  Pain Level (0-10 scale): 1  Any medication changes, allergies to medications, adverse drug reactions, diagnosis change, or new procedure performed?: [x] No    [] Yes (see summary sheet for update)  Subjective functional status/changes:   [] No changes reported  Reports pain 2/10 on average over last weak with more soreness and tightness rather than sharp pains. OBJECTIVE    Modality rationale: decrease inflammation and decrease pain to improve the patients ability to improve ease of self care. Min Type Additional Details   15 [x]  Ice     []  heat  []  Ice massage  []  Laser   []  Anodyne Position: supine  Location: right thoracic spine   [] Skin assessment post-treatment:  []intact []redness- no adverse reaction    []redness - adverse reaction:     30 min Therapeutic Exercise:  [x] See flow sheet :   Rationale: increase ROM and increase strength to improve the patients ability to manage ADLs. 10 min Manual Therapy: In left S/L, STM to lateral scapular border. In prone: thoracic PA GR3 oscillations, rib springs GR3; DTM to right thoracic paraspinals/rhomboids and medial scapular border. The manual therapy interventions were performed at a separate and distinct time from the therapeutic activities interventions. Rationale: decrease pain, increase ROM and increase tissue extensibility to improve capacity and ease of self care.            With   [] TE   [] TA   [] neuro   [] other: Patient Education: [x] Review HEP    [] Progressed/Changed HEP based on:   [] positioning   [] body mechanics   [] transfers   [] heat/ice application    [] other:      Other Objective/Functional Measures: see PN     Pain Level (0-10 scale) post treatment: 1    ASSESSMENT/Changes in Function: Since Mr. Nirali Pitt, he notes a large improvement in function and pain. Pain is no longer sharp in nature and is now tender or \"tight. \"  Average pain intensity of 2/10 over last week. He will benefit from continued therapy at frequency of 1x/week to address remaining goals and progress HEP. Patient will continue to benefit from skilled PT services to modify and progress therapeutic interventions, address functional mobility deficits, address strength deficits, analyze and address soft tissue restrictions, analyze and cue movement patterns, analyze and modify body mechanics/ergonomics and assess and modify postural abnormalities to attain remaining goals. []  See Plan of Care  [x]  See progress note/recertification  []  See Discharge Summary         Progress towards goals / Updated goals:  1. Pt will report compliance with HEP to maximize therapeutic success.              Eval: HEP assigned              Current: progressing, performs 2x/week (1/5/2021)  Long Term Goals: To be accomplished in 4 weeks:  1. Pt will improve FOTO to 61%, demonstrating improved functional capacity for ADLs.             Eval: 50%   Current: met, 66% (1/5/2020)  2. Pt will perform scapular protraction in seated without pain, demonstrating improved capacity for return to work.  Davin Herrera: seated scapular protraction with 90 degrees GH flexion causes sharp pain              Current: met, performs scapular retraction with 90 degrees GH flexion (1/5/2021)  3. Pt will perform thoracic extension to 50% or better without pain increase to improve ease of ADL performance.              Eval: increased pain, 50%              Current: met, 75% of WNL minimal to no pain 1/5/2021  4.  Pt will perform middle and lower trapezius MMT with grade 4/5 strength to improve ease of work performance.  Willicindy Pillion: 3+/5 with decreased ease of scapular retraction    Current: progressing, 4-/5 B without pain. (1/5/2021)    PLAN  []  Upgrade activities as tolerated     [x]  Continue plan of care  []  Update interventions per flow sheet       []  Discharge due to:_  []  Other:_      Madelin Zheng, PT 1/5/2021  3:42 PM    Future Appointments   Date Time Provider Balta Andrea   1/5/2021  3:45 PM Opal Bailey, PT [facility]

## 2021-01-05 NOTE — PROGRESS NOTES
[facility]  [address]  [place], [address]  [phone] fax    Physical Therapy Progress Note  Patient name: Selam Ponce. Start of Care: 2020   Referral source: Fadia Nava, [address] : 1970   Medical/Treatment Diagnosis: Back pain [M54.9]  Payor: BLUE CROSS / Plan: [address] [place] [address] / Product Type: PPO /  Onset Date:1 month prior to [facility]     Prior Hospitalization: see medical history Provider#: [id number]   Medications: Verified on Patient Summary List    Comorbidities: diabetes, sleep apnea, high blood pressure   Prior Level of Function: performs work activities without shoulder pain; prior history of LBP  Visits from Start of Care: 7    Missed Visits: 3    Established Goals:        Excellent         Good         Limited            None  [x] Increased ROM   []  [x]  []  []  [x] Increased Strength  []  [x]  []  []  [x] Increased Mobility  []  [x]  []  []   [x] Decreased Pain   []  [x]  []  []  [] Decreased Swelling  []  []  []  []    Key Functional Changes: FOTO 66%    Updated Goals: to be achieved in 3-4 weeks:  1. Pt will perform thoracic extension to 50% or better without pain increase to improve ease of ADL performance.              PN: met, 75% of WNL minimal to no pain   2. Pt will perform middle and lower trapezius MMT with grade 4/5 strength to improve ease of work performance. PN: progressing, 4-/5 B without pain.    3.Pt will report compliance with HEP 3x/week to maximize therapeutic success.              PN: progressing, performs 2x/week   ASSESSMENT/RECOMMENDATIONS:  [x]Continue therapy per initial plan/protocol at a frequency of  1 x per week for 4 weeks  []Continue therapy with the following recommended changes:_____________________      _____________________________________________________________________  []Discontinue therapy progressing towards or have reached established goals  []Discontinue therapy due to lack of appreciable progress towards goals  []Discontinue therapy due to lack of attendance or compliance  []Await Physician's recommendations/decisions regarding therapy  []Other:________________________________________________________________    Thank you for this referral.    Giacomo Spears, PT 1/5/2021 5:04 PM  NOTE TO PHYSICIAN:  PLEASE COMPLETE THE ORDERS BELOW AND   FAX TO [facility]: [phone]  If you are unable to process this request in 24 hours please contact our office: [phone]    ? I have read the above report and request that my patient continue as recommended. ? I have read the above report and request that my patient continue therapy with the following changes/special instructions:__________________________________________________________  ? I have read the above report and request that my patient be discharged from therapy.     Physicians signature: ______________________________Date: ______Time:______

## 2021-01-11 ENCOUNTER — HOSPITAL ENCOUNTER (OUTPATIENT)
Dept: PHYSICAL THERAPY | Age: 51
Discharge: HOME | End: 2021-01-11
Payer: COMMERCIAL

## 2021-01-11 PROCEDURE — 97110 THERAPEUTIC EXERCISES: CPT

## 2021-01-11 PROCEDURE — 97140 MANUAL THERAPY 1/> REGIONS: CPT

## 2021-01-11 NOTE — PROGRESS NOTES
PT DAILY TREATMENT NOTE     Patient Name: Ashutosh Leal  Date:2021  : 1970  [x]  Patient  Verified  Payor: BLUE CROSS / Plan: [address] [place] [address] Avenue / Product Type: PPO /    In time:12:05  Out time:12:55  Total Treatment Time (min): 50  Visit #: 2 of 4    Medicare/BCBS Only   Total Timed Codes (min):  35 1:1 Treatment Time:  35       Treatment Area: Back pain [M54.9]    SUBJECTIVE  Pain Level (0-10 scale): 1-2/10  Any medication changes, allergies to medications, adverse drug reactions, diagnosis change, or new procedure performed?: [x] No    [] Yes (see summary sheet for update)  Subjective functional status/changes:   [] No changes reported  \"Getting better. \"    OBJECTIVE    Modality rationale: decrease pain to improve the patients ability to perform ADL's.    Min Type Additional Details    [] Estim:  []Unatt       []IFC  []Premod                        []Other:  []w/ice   []w/heat  Position:  Location:    [] Estim: []Att    []TENS instruct  []NMES                    []Other:  []w/US   []w/ice   []w/heat  Position:  Location:    []  Traction: [] Cervical       []Lumbar                       [] Prone          []Supine                       []Intermittent   []Continuous Lbs:  [] before manual  [] after manual    []  Ultrasound: []Continuous   [] Pulsed                           []1MHz   []3MHz W/cm2:  Location:    []  Iontophoresis with dexamethasone         Location: [] Take home patch   [] In clinic   15 [x]  Ice     []  heat  []  Ice massage  []  Laser   []  Anodyne Position: Prone  Location: Middle and upper thoracic    []  Laser with stim  []  Other:  Position:  Location:    []  Vasopneumatic Device Pressure:       [] lo [] med [] hi   Temperature: [] lo [] med [] hi   [] Skin assessment post-treatment:  []intact []redness- no adverse reaction    []redness - adverse reaction:     27 min Therapeutic Exercise:  [x] See flow sheet :   Rationale: increase ROM, increase strength and increase proprioception to improve the patients ability to perform ADL's.    8 min Manual Therapy:  T/S PA mobs, rib springs grade III. STM/DTM Right rhomboids and T/S paraspinals. Pt prone. The manual therapy interventions were performed at a separate and distinct time from the therapeutic activities interventions. Rationale: decrease pain, increase ROM, increase tissue extensibility and decrease trigger points to improve ease of performing functional activities. With   [x] TE   [] TA   [] neuro   [] other: Patient Education: [x] Review HEP    [] Progressed/Changed HEP based on:   [] positioning   [] body mechanics   [] transfers   [] heat/ice application    [] other:      Other Objective/Functional Measures: Added JOSE alt shoulder flexion 10x each. Added 2# to side-lying right shoulder PRE's. Pt developed mm spasm in right rhomboids while performing prone W exercise. Pain Level (0-10 scale) post treatment: 3-4/10    ASSESSMENT/Changes in Function: Pt reports taking only 1 Tylenol before work, no longer taking prescribed medication. Pain level increased secondary to spasm while performing prone series. Instructed pt to perform HEP and ice as needed. Continue PT to decrease mm restrictions, increase scapular/trunk mobility/stability to improve ease of performing functional activities. Patient will continue to benefit from skilled PT services to modify and progress therapeutic interventions, address functional mobility deficits, address ROM deficits, address strength deficits, analyze and address soft tissue restrictions and analyze and modify body mechanics/ergonomics to attain remaining goals. [x]  See Plan of Care  []  See progress note/recertification  []  See Discharge Summary         Progress towards goals / Updated goals:  Updated Goals: to be achieved in 3-4 weeks:  1.  Pt will perform thoracic extension to 50% or better without pain increase to improve ease of ADL performance.              PN: met, 75% of WNL minimal to no pain   2. Pt will perform middle and lower trapezius MMT with grade 4/5 strength to improve ease of work performance.               CQ: progressing, 4-/5 B without pain.    3.Pt will report compliance with HEP 3x/week to maximize therapeutic success.              PN: progressing, performs 2x/week     PLAN  []  Upgrade activities as tolerated     [x]  Continue plan of care  []  Update interventions per flow sheet       []  Discharge due to:_  []  Other:_      Cinthya Paz, PTA 1/11/2021  12:05 PM    Future Appointments   Date Time Provider Balta Burchi   1/18/2021  9:15 AM Mireya Fraser, PT [facility]   1/25/2021 12:00 PM [facility]

## 2021-02-01 RX ORDER — LISINOPRIL 30 MG/1
TABLET ORAL
Qty: 90 TAB | Refills: 0 | Status: SHIPPED | OUTPATIENT
Start: 2021-02-01 | End: 2021-06-22

## 2021-02-01 RX ORDER — INSULIN GLARGINE 100 [IU]/ML
80 INJECTION, SOLUTION SUBCUTANEOUS
Qty: 30 ADJUSTABLE DOSE PRE-FILLED PEN SYRINGE | Refills: 0 | Status: SHIPPED | OUTPATIENT
Start: 2021-02-01 | End: 2021-08-26

## 2021-02-04 NOTE — PROGRESS NOTES
[facility]  [address]  [place], [address]  [phone] fax    Physical Therapy Discharge Summary  Patient name: Jim Nye. Start of Care: 2020   Referral source: Mario Romberg, [id number] : 1970   Medical/Treatment Diagnosis: Back pain [M54.9]  Payor: BLUE CROSS / Plan: [address] [place] [address] / Product Type: PPO /  Onset Date:1 month prior to [facility]     Prior Hospitalization: see medical history Provider#: [id number]   Medications: Verified on Patient Summary List    Comorbidities: DM, sleep apnea, HBP  Prior Level of Function:performs work activities without shoulder pain; prior history of LBP  Visits from Start of Care: 8    Missed Visits: 3  Reporting Period : 2021 to 2021     Summary of Care:  1. Pt will perform thoracic extension to 50% or better without pain increase to improve ease of ADL performance.              PN: met, 75% of WNL minimal to no pain   2. Pt will perform middle and lower trapezius MMT with grade 4/5 strength to improve ease of work performance.               PN: progressing, 4-/5 B without pain. 3.Pt will report compliance with HEP 3x/week to maximize therapeutic success.              PN: progressing, performs 2x/week     ASSESSMENT/RECOMMENDATIONS:  Goals unable to be reassessed at this time due to pt electing to self d/c for now. Pt has made good progress towards goals, restoring scapular strength and ROM and decreasing pain. Decreased need for pain medication at work. Recommended for pt to continue with HEP.  Thank you for this referral!     [x]Discontinue therapy: []Patient has reached or is progressing toward set goals      [x]Patient is non-compliant or has abdicated      []Due to lack of appreciable progress towards set goals    Chris Rivers, PT 2021 2:18 PM

## 2021-02-04 NOTE — PROGRESS NOTES
Physical Therapy Discharge Instructions      [facility]  [address] [place] [address]  [place], [address]  [phone] fax    Patient: Yaw Bell.   : 1970      Continue Home Exercise Program 2 times per day for 6 weeks, then decrease to 3 times per week      Continue with    [x] Ice  as needed      [x] Heat           Follow up with MD:     [] Upon completion of therapy     [x] As needed      Recommendations:     [x]   Return to activity with home program    Moy Valle, PT 2021 2:21 PM

## 2021-05-14 DIAGNOSIS — Z13.1 SCREENING FOR DIABETES MELLITUS (DM): Primary | ICD-10-CM

## 2021-05-19 DIAGNOSIS — E11.65 TYPE 2 DIABETES MELLITUS WITH HYPERGLYCEMIA, UNSPECIFIED WHETHER LONG TERM INSULIN USE (HCC): Primary | ICD-10-CM

## 2021-05-20 ENCOUNTER — TELEPHONE (OUTPATIENT)
Dept: FAMILY MEDICINE CLINIC | Age: 51
End: 2021-05-20

## 2021-05-20 NOTE — TELEPHONE ENCOUNTER
Requested Prescriptions     Pending Prescriptions Disp Refills    Trulicity 1.5 VV/6.9 mL sub-q pen [Pharmacy Med Name: TRULICITY 1.5 QU/1.7 ML PEN] 6 Syringe 3     Si.5 ML BY SUBCUTANEOUS ROUTE EVERY SEVEN (7) DAYS.

## 2021-05-20 NOTE — TELEPHONE ENCOUNTER
"Subjective:   Foreign Tinoco Last Visit: 20 with MANDO Biggs  Next Appointment: 21 with MANDO Biggs  Previous Refill Encounter(s): 20 #6 with 3 refills    Requested Prescriptions     Pending Prescriptions Disp Refills    Trulicity 1.5 UR/2.3 mL sub-q pen [Pharmacy Med Name: TRULICITY 1.5 VV/5.7 ML PEN] 12 Syringe 1     Si.5 ML BY SUBCUTANEOUS ROUTE EVERY SEVEN (7) DAYS. 

## 2021-05-21 ENCOUNTER — APPOINTMENT (OUTPATIENT)
Dept: FAMILY MEDICINE CLINIC | Age: 51
End: 2021-05-21

## 2021-05-21 ENCOUNTER — HOSPITAL ENCOUNTER (OUTPATIENT)
Dept: LAB | Age: 51
Discharge: HOME | End: 2021-05-21
Payer: COMMERCIAL

## 2021-05-21 DIAGNOSIS — E11.65 TYPE 2 DIABETES MELLITUS WITH HYPERGLYCEMIA, UNSPECIFIED WHETHER LONG TERM INSULIN USE (HCC): ICD-10-CM

## 2021-05-21 LAB
EST. AVERAGE GLUCOSE BLD GHB EST-MCNC: 157 MG/DL
HBA1C MFR BLD: 7.1 % (ref 4.2–5.6)

## 2021-05-21 PROCEDURE — 36415 COLL VENOUS BLD VENIPUNCTURE: CPT

## 2021-05-21 PROCEDURE — 83036 HEMOGLOBIN GLYCOSYLATED A1C: CPT

## 2021-05-24 RX ORDER — DULAGLUTIDE 1.5 MG/.5ML
1.5 INJECTION, SOLUTION SUBCUTANEOUS
Qty: 12 SYRINGE | Refills: 1 | Status: SHIPPED | OUTPATIENT
Start: 2021-05-24 | End: 2021-08-26

## 2021-06-22 ENCOUNTER — OFFICE VISIT (OUTPATIENT)
Dept: FAMILY MEDICINE CLINIC | Age: 51
End: 2021-06-22
Payer: COMMERCIAL

## 2021-06-22 ENCOUNTER — HOSPITAL ENCOUNTER (OUTPATIENT)
Dept: LAB | Age: 51
Discharge: HOME | End: 2021-06-22

## 2021-06-22 ENCOUNTER — HOSPITAL ENCOUNTER (OUTPATIENT)
Dept: LAB | Age: 51
Discharge: HOME | End: 2021-06-22
Payer: COMMERCIAL

## 2021-06-22 VITALS
DIASTOLIC BLOOD PRESSURE: 80 MMHG | RESPIRATION RATE: 18 BRPM | BODY MASS INDEX: 43.86 KG/M2 | TEMPERATURE: 99 F | HEIGHT: 70 IN | WEIGHT: 306.4 LBS | OXYGEN SATURATION: 96 % | HEART RATE: 96 BPM | SYSTOLIC BLOOD PRESSURE: 134 MMHG

## 2021-06-22 DIAGNOSIS — R33.9 INCOMPLETE BLADDER EMPTYING: ICD-10-CM

## 2021-06-22 DIAGNOSIS — Z12.5 SCREENING FOR PROSTATE CANCER: ICD-10-CM

## 2021-06-22 DIAGNOSIS — E11.65 TYPE 2 DIABETES MELLITUS WITH HYPERGLYCEMIA, UNSPECIFIED WHETHER LONG TERM INSULIN USE (HCC): ICD-10-CM

## 2021-06-22 DIAGNOSIS — R10.9 ABDOMINAL CRAMPING: ICD-10-CM

## 2021-06-22 DIAGNOSIS — I10 ESSENTIAL HYPERTENSION: ICD-10-CM

## 2021-06-22 DIAGNOSIS — R22.41 NODULE OF SKIN OF RIGHT LOWER LEG: ICD-10-CM

## 2021-06-22 DIAGNOSIS — E11.65 TYPE 2 DIABETES MELLITUS WITH HYPERGLYCEMIA, UNSPECIFIED WHETHER LONG TERM INSULIN USE (HCC): Primary | ICD-10-CM

## 2021-06-22 LAB
ALBUMIN SERPL-MCNC: 4.1 G/DL (ref 3.4–5)
ALBUMIN/GLOB SERPL: 1 {RATIO} (ref 0.8–1.7)
ALP SERPL-CCNC: 81 U/L (ref 45–117)
ALT SERPL-CCNC: 38 U/L (ref 16–61)
ANION GAP SERPL CALC-SCNC: 4 MMOL/L (ref 3–18)
AST SERPL-CCNC: 28 U/L (ref 10–38)
BILIRUB SERPL-MCNC: 0.3 MG/DL (ref 0.2–1)
BUN SERPL-MCNC: 31 MG/DL (ref 7–18)
BUN/CREAT SERPL: 19 (ref 12–20)
CALCIUM SERPL-MCNC: 10 MG/DL (ref 8.5–10.1)
CHLORIDE SERPL-SCNC: 103 MMOL/L (ref 100–111)
CHOLEST SERPL-MCNC: 325 MG/DL
CO2 SERPL-SCNC: 31 MMOL/L (ref 21–32)
CREAT SERPL-MCNC: 1.6 MG/DL (ref 0.6–1.3)
CREAT UR-MCNC: 82 MG/DL (ref 30–125)
GLOBULIN SER CALC-MCNC: 4.3 G/DL (ref 2–4)
GLUCOSE SERPL-MCNC: 132 MG/DL (ref 74–99)
HDLC SERPL-MCNC: 38 MG/DL (ref 40–60)
HDLC SERPL: 8.6 {RATIO} (ref 0–5)
LDLC SERPL CALC-MCNC: 227.4 MG/DL (ref 0–100)
LIPID PROFILE,FLP: ABNORMAL
MICROALBUMIN UR-MCNC: 33.3 MG/DL (ref 0–3)
MICROALBUMIN/CREAT UR-RTO: 406 MG/G (ref 0–30)
POTASSIUM SERPL-SCNC: 5 MMOL/L (ref 3.5–5.5)
PROT SERPL-MCNC: 8.4 G/DL (ref 6.4–8.2)
SODIUM SERPL-SCNC: 138 MMOL/L (ref 136–145)
TRIGL SERPL-MCNC: 298 MG/DL (ref ?–150)
VLDLC SERPL CALC-MCNC: 59.6 MG/DL

## 2021-06-22 PROCEDURE — 99214 OFFICE O/P EST MOD 30 MIN: CPT | Performed by: NURSE PRACTITIONER

## 2021-06-22 PROCEDURE — 3051F HG A1C>EQUAL 7.0%<8.0%: CPT | Performed by: NURSE PRACTITIONER

## 2021-06-22 PROCEDURE — 82043 UR ALBUMIN QUANTITATIVE: CPT

## 2021-06-22 PROCEDURE — 36415 COLL VENOUS BLD VENIPUNCTURE: CPT

## 2021-06-22 PROCEDURE — 84154 ASSAY OF PSA FREE: CPT

## 2021-06-22 PROCEDURE — 80053 COMPREHEN METABOLIC PANEL: CPT

## 2021-06-22 PROCEDURE — 80061 LIPID PANEL: CPT

## 2021-06-22 RX ORDER — FLASH GLUCOSE SENSOR
KIT MISCELLANEOUS
Qty: 2 KIT | Refills: 11 | Status: SHIPPED | OUTPATIENT
Start: 2021-06-22 | End: 2022-08-31 | Stop reason: SDUPTHER

## 2021-06-22 RX ORDER — TAMSULOSIN HYDROCHLORIDE 0.4 MG/1
0.4 CAPSULE ORAL DAILY
Qty: 900 CAPSULE | Refills: 1 | Status: SHIPPED | OUTPATIENT
Start: 2021-06-22 | End: 2021-09-22 | Stop reason: SDUPTHER

## 2021-06-22 RX ORDER — LISINOPRIL AND HYDROCHLOROTHIAZIDE 20; 25 MG/1; MG/1
1 TABLET ORAL DAILY
Qty: 90 TABLET | Refills: 2 | Status: SHIPPED | OUTPATIENT
Start: 2021-06-22 | End: 2022-02-25

## 2021-06-22 RX ORDER — SILDENAFIL 100 MG/1
100 TABLET, FILM COATED ORAL AS NEEDED
Qty: 10 TABLET | Refills: 1 | Status: SHIPPED | OUTPATIENT
Start: 2021-06-22 | End: 2021-12-22 | Stop reason: SDUPTHER

## 2021-06-22 NOTE — PROGRESS NOTES
Subjective:    Raffi Odonnell is a 49y.o. year old male seen for follow up of diabetes. He also has hypertension and hyperlipidemia. Diabetic Review of Systems - medication compliance: compliant all of the time, diabetic diet compliance: compliant all of the time, home glucose monitoring: is not performed, comments he ran out , further diabetic ROS: no polyuria or polydipsia, no chest pain, dyspnea or TIA's, no numbness, tingling or pain in extremities. Patient states he has decreased his eating. Patient states he has been taking lisinopril 20/25 mg after he ran out of his liniopril 30 mg. Denies having any abd cramping since he resumed taking medication. Further reports since he ran out of metformin the abd cramping/spasms he was experiencing have resolved. Other symptoms and concerns: requesting refill on Keldeal Jarad. Patient states he is having difficulty with urination. Comments he has feelings of incomplete bladder emptying over the last 2-3 years. Comments symptoms seems like they are getting slightly worse. Comments in the morning he will empty his bladder but doesn't feel like he completely empted his bladder comments after 5-10 mins he is able to finish emptying his bladder. Patient also reports he has noticed a non tender nodule to right lower medial leg. Would like to know what this is. Patient Active Problem List   Diagnosis Code    [facility] E66.01     Current Outpatient Medications   Medication Sig Dispense Refill    Trulicity 1.5 FA/0.1 mL sub-q pen 0.5 ML BY SUBCUTANEOUS ROUTE EVERY SEVEN (7) DAYS. 12 Syringe 1    lisinopriL (PRINIVIL, ZESTRIL) 30 mg tablet TAKE 1 TABLET BY MOUTH EVERY DAY 90 Tab 0    insulin glargine (Basaglar KwikPen U-100 Insulin) 100 unit/mL (3 mL) inpn 80 Units by SubCUTAneous route nightly. INJECT 80 UNITS BY SUBCUTANEOUS ROUTE EVERY EVENING.  30 Adjustable Dose Pre-filled Pen Syringe 0    sildenafil citrate (VIAGRA) 100 mg tablet Take 1 Tab by mouth as needed (45 mins prior to intercourse). 10 Tab 1    flash glucose sensor (FreeStyle Jarad 14 Day Sensor) kit Apply new sensor every 14 days to monitor blood sugar as directed. Please dispense 2 sensors. 2 Kit 11    Jardiance 25 mg tablet TAKE 1 TABLET BY MOUTH EVERY DAY 90 Tab 1    Insulin Needles, Disposable, (BD Ultra-Fine Short Pen Needle) 31 gauge x 5/16\" ndle Inject 80 units by subcutaneous route every night. 1 Package 11    Blood Pressure Kit-Extra Large kit Take blood pressure as directed 1 Each 0    Blood-Glucose Transmitter (Dexcom G6 Transmitter) eliseo Use with Dexcom CGM to check blood sugar 4 or more times daily. 1 Device 2    Blood-Glucose Sensor (Dexcom G6 Sensor) eliseo Use to check blood sugar 4 or more times daily. 3 Device 11    Blood-Glucose Meter,Continuous (DEXCOM ) misc Use to check blood sugar 4 or more times daily. 1 Each 1    flash glucose scanning reader (FREESTYLE JARAD 14 DAY READER) misc Use to monitor blood sugar at least 3 times daily 1 Each 0    multivitamin (ONE A DAY) tablet Take 1 Tab by mouth daily.  rosuvastatin (CRESTOR) 20 mg tablet TAKE 1 TABLET BY MOUTH EVERY DAY AT NIGHT (Patient not taking: Reported on 6/22/2021) 90 Tab 0    cyclobenzaprine (FLEXERIL) 10 mg tablet 1 tid prn back pain (Patient not taking: Reported on 6/22/2021) 21 Tab 0    metFORMIN (GLUCOPHAGE) 1,000 mg tablet Take 1 Tab by mouth two (2) times daily (with meals). (Patient not taking: Reported on 6/22/2021) 60 Tab 3      Allergies   Allergen Reactions    Pcn [Penicillins] Other (comments)     Unknown was told when a child     Past Surgical History:   Procedure Laterality Date    HX APPENDECTOMY       Family History   Problem Relation Age of Onset    Cancer Mother         unknown ?  breast    Heart Disease Father     No Known Problems Sister     Cancer Brother     No Known Problems Sister     No Known Problems Brother     No Known Problems Brother     Diabetes Daughter     Diabetes Daughter       Lab Results   Component Value Date/Time    Cholesterol, total 221 (H) 02/12/2020 10:30 AM    HDL Cholesterol 36 (L) 02/12/2020 10:30 AM    LDL, calculated 135.8 (H) 02/12/2020 10:30 AM    VLDL, calculated 49.2 02/12/2020 10:30 AM    Triglyceride 246 (H) 02/12/2020 10:30 AM    CHOL/HDL Ratio 6.1 (H) 02/12/2020 10:30 AM     Lab Results   Component Value Date/Time    Sodium 139 02/12/2020 10:30 AM    Potassium 4.4 02/12/2020 10:30 AM    Chloride 102 02/12/2020 10:30 AM    CO2 32 02/12/2020 10:30 AM    Anion gap 5 02/12/2020 10:30 AM    Glucose 139 (H) 02/12/2020 10:30 AM    BUN 38 (H) 02/12/2020 10:30 AM    Creatinine 1.74 (H) 02/12/2020 10:30 AM    BUN/Creatinine ratio 22 (H) 02/12/2020 10:30 AM    GFR est AA 51 (L) 02/12/2020 10:30 AM    GFR est non-AA 42 (L) 02/12/2020 10:30 AM    Calcium 9.5 02/12/2020 10:30 AM    Bilirubin, total 0.5 02/12/2020 10:30 AM    Alk.  phosphatase 66 02/12/2020 10:30 AM    Protein, total 7.9 02/12/2020 10:30 AM    Albumin 4.0 02/12/2020 10:30 AM    Globulin 3.9 02/12/2020 10:30 AM    A-G Ratio 1.0 02/12/2020 10:30 AM    ALT (SGPT) 43 02/12/2020 10:30 AM    AST (SGOT) 26 02/12/2020 10:30 AM     Lab Results   Component Value Date/Time    WBC 5.2 08/28/2019 07:19 AM    HGB 14.0 08/28/2019 07:19 AM    HCT 44.1 08/28/2019 07:19 AM    PLATELET 519 41/99/5820 07:19 AM    MCV 84.6 08/28/2019 07:19 AM     Lab Results   Component Value Date/Time    Hemoglobin A1c 7.1 (H) 05/21/2021 02:20 PM    Hemoglobin A1c (POC) 8.9 03/10/2020 07:30 AM     Lab Results   Component Value Date/Time    Microalbumin/Creat ratio (mg/g creat) 40 (H) 10/29/2019 08:05 AM    Microalbumin,urine random 6.28 (H) 10/29/2019 08:05 AM     Wt Readings from Last 3 Encounters:   06/22/21 306 lb 6.4 oz (139 kg)   10/22/20 312 lb (141.5 kg)   03/10/20 311 lb (141.1 kg)     Last Point of Care HGB A1C  Hemoglobin A1c (POC)   Date Value Ref Range Status   03/10/2020 8.9 % Final      BP Readings from Last 3 Encounters:   06/22/21 (!) 150/84   10/22/20 134/80   03/10/20 113/62       Results for orders placed or performed during the hospital encounter of 05/21/21   HEMOGLOBIN A1C WITH EAG   Result Value Ref Range    Hemoglobin A1c 7.1 (H) 4.2 - 5.6 %    Est. average glucose 157 mg/dL         Last Diabetic Foot Exam on:   Diabetic Foot and Eye Exam  Status   Topic Date Due    Diabetic Foot Care  02/11/2021    Eye Exam  02/08/2023       Objective:  Visit Vitals  /80 (BP 1 Location: Right upper arm, BP Patient Position: Sitting, BP Cuff Size: Large adult)   Pulse 96   Temp 99 °F (37.2 °C) (Oral)   Resp 18   Ht 5' 10\" (1.778 m)   Wt 306 lb 6.4 oz (139 kg)   SpO2 96%   BMI 43.96 kg/m²     Awake and alert in no acute distress   Neck supple without lymphadenopathy, no carotid artery bruits auscultated bilaterally. No thyromegaly   Lungs clear throughout   S1 S2 RRR without ectopy or murmur auscultated. Extremities: no clubbing, cyanosis, peripheral edema   Abdomen - soft, nontender, nondistended, no masses or organomegaly  Integumentary: no rashes, small nontender nodule to right lower anterior leg  Reviewed vital signs       Diabetic foot exam:     Left Foot:   Visual Exam: callous - posteriorly   Pulse DP: 2+ (normal)   Filament test: 4/6   Vibratory sensation: normal      Right Foot:   Visual Exam: callous - right medial great toe   Pulse DP: 2+ (normal)   Filament test: 4/6   Vibratory sensation: normal      Assessment/Plan:    ICD-10-CM ICD-9-CM    1. Type 2 diabetes mellitus with hyperglycemia, unspecified whether long term insulin use (HCC)  E11.65 250.00 LIPID PANEL      METABOLIC PANEL, COMPREHENSIVE      MICROALBUMIN, UR, RAND W/ MICROALB/CREAT RATIO       DIABETES FOOT EXAM   2. Essential hypertension  I10 401.9    3. Abdominal cramping  R10.9 789.00    4. Incomplete bladder emptying  R33.9 788.21    5. Screening for prostate cancer  Z12.5 V76.44 PSA, TOTAL &  FREE   6.  Nodule of skin of right lower leg  R22.41 782.2 US EXT NONVAS RT LTD     Orders Placed This Encounter    US EXT NONVAS RT LTD    LIPID PANEL    METABOLIC PANEL, COMPREHENSIVE    MICROALBUMIN, UR, RAND W/ MICROALB/CREAT RATIO    PSA, TOTAL &  FREE    flash glucose sensor (FreeStyle Jarad 14 Day Sensor) kit    sildenafil citrate (VIAGRA) 100 mg tablet    lisinopril-hydroCHLOROthiazide (PRINZIDE, ZESTORETIC) 20-25 mg per tablet    tamsulosin (Flomax) 0.4 mg capsule      Issues reviewed with him: low cholesterol diet, weight control and daily exercise discussed, diabetic Sick Day rules reviewed, handout given, foot care discussed and Podiatry visits discussed and annual eye examinations at Ophthalmology discussed. I have discussed the diagnosis with the patient and the intended plan as seen in the above orders. The patient has received an after-visit summary and questions were answered concerning future plans. I have discussed medication side effects and warnings with the patient as well. Patient agreeable with above plan and verbalizes understanding. Follow-up and Dispositions    · Return in about 3 months (around 9/22/2021) for DM/HTN/HLD, fasting labs 1 wk prior, in office follow up.

## 2021-06-22 NOTE — PATIENT INSTRUCTIONS
Diabetes Foot Health: Care Instructions  Your Care Instructions     When you have diabetes, your feet need extra care and attention. Diabetes can damage the nerve endings and blood vessels in your feet, making you less likely to notice when your feet are injured. Diabetes also limits your body's ability to fight infection and get blood to areas that need it. If you get a minor foot injury, it could become an ulcer or a serious infection. With good foot care, you can prevent most of these problems. Caring for your feet can be quick and easy. Most of the care can be done when you are bathing or getting ready for bed. Follow-up care is a key part of your treatment and safety. Be sure to make and go to all appointments, and call your doctor if you are having problems. It's also a good idea to know your test results and keep a list of the medicines you take. How can you care for yourself at home? · Keep your blood sugar close to normal by watching what and how much you eat, monitoring blood sugar, taking medicines if prescribed, and getting regular exercise. · Do not smoke. Smoking affects blood flow and can make foot problems worse. If you need help quitting, talk to your doctor about stop-smoking programs and medicines. These can increase your chances of quitting for good. · Eat a diet that is low in fats. High fat intake can cause fat to build up in your blood vessels and decrease blood flow. · Inspect your feet daily for blisters, cuts, cracks, or sores. If you cannot see well, use a mirror or have someone help you. · Take care of your feet:  ? Wash your feet every day. Use warm (not hot) water. Check the water temperature with your wrists or other part of your body, not your feet. ? Dry your feet well. Pat them dry. Do not rub the skin on your feet too hard. Dry well between your toes. If the skin on your feet stays moist, bacteria or a fungus can grow, which can lead to infection. ?  Keep your skin soft. Use moisturizing skin cream to keep the skin on your feet soft and prevent calluses and cracks. But do not put the cream between your toes, and stop using any cream that causes a rash. ? Clean underneath your toenails carefully. Do not use a sharp object to clean underneath your toenails. Use the blunt end of a nail file or other rounded tool. ? Trim and file your toenails straight across to prevent ingrown toenails. Use a nail clipper, not scissors. Use an emery board to smooth the edges. · Change socks daily. Socks without seams are best, because seams often rub the feet. You can find socks for people with diabetes from specialty catalogs. · Look inside your shoes every day for things like gravel or torn linings, which could cause blisters or sores. · Buy shoes that fit well:  ? Look for shoes that have plenty of space around the toes. This helps prevent bunions and blisters. ? Try on shoes while wearing the kind of socks you will usually wear with the shoes. ? Avoid plastic shoes. They may rub your feet and cause blisters. Good shoes should be made of materials that are flexible and breathable, such as leather or cloth. ? Break in new shoes slowly by wearing them for no more than an hour a day for several days. Take extra time to check your feet for red areas, blisters, or other problems after you wear new shoes. · Do not go barefoot. Do not wear sandals, and do not wear shoes with very thin soles. Thin soles are easy to puncture. They also do not protect your feet from hot pavement or cold weather. · Have your doctor check your feet during each visit. If you have a foot problem, see your doctor. Do not try to treat an early foot problem at home. Home remedies or treatments that you can buy without a prescription (such as corn removers) can be harmful. · Always get early treatment for foot problems. A minor irritation can lead to a major problem if not properly cared for early.   When should you call for help? Call your doctor now or seek immediate medical care if:    · You have a foot sore, an ulcer or break in the skin that is not healing after 4 days, bleeding corns or calluses, or an ingrown toenail.     · You have blue or black areas, which can mean bruising or blood flow problems.     · You have peeling skin or tiny blisters between your toes or cracking or oozing of the skin.     · You have a fever for more than 24 hours and a foot sore.     · You have new numbness or tingling in your feet that does not go away after you move your feet or change positions.     · You have unexplained or unusual swelling of the foot or ankle. Watch closely for changes in your health, and be sure to contact your doctor if:    · You cannot do proper foot care. Where can you learn more? Go to http://www.gray.com/  Enter A739 in the search box to learn more about \"Diabetes Foot Health: Care Instructions. \"  Current as of: August 31, 2020               Content Version: 12.8  © 2006-2021 [employer]. Care instructions adapted under license by [id number] (which disclaims liability or warranty for this information). If you have questions about a medical condition or this instruction, always ask your healthcare professional. [address] any warranty or liability for your use of this information.

## 2021-06-24 LAB
PSA FREE MFR SERPL: 36.7 %
PSA FREE SERPL-MCNC: 0.22 NG/ML
PSA SERPL-MCNC: 0.6 NG/ML (ref 0–4)

## 2021-07-07 ENCOUNTER — HOSPITAL ENCOUNTER (OUTPATIENT)
Dept: ULTRASOUND IMAGING | Age: 51
Discharge: HOME | End: 2021-07-07
Attending: NURSE PRACTITIONER
Payer: COMMERCIAL

## 2021-07-07 DIAGNOSIS — R22.41 NODULE OF SKIN OF RIGHT LOWER LEG: ICD-10-CM

## 2021-07-07 PROCEDURE — 76882 US LMTD JT/FCL EVL NVASC XTR: CPT

## 2021-08-12 RX ORDER — EMPAGLIFLOZIN 25 MG/1
TABLET, FILM COATED ORAL
Qty: 90 TABLET | Refills: 1 | Status: SHIPPED | OUTPATIENT
Start: 2021-08-12 | End: 2021-12-22

## 2021-08-26 RX ORDER — INSULIN GLARGINE 100 [IU]/ML
75 INJECTION, SOLUTION SUBCUTANEOUS
Qty: 67.5 ML | Refills: 3 | Status: SHIPPED | OUTPATIENT
Start: 2021-08-26 | End: 2021-09-01 | Stop reason: SDUPTHER

## 2021-08-26 RX ORDER — DULAGLUTIDE 1.5 MG/.5ML
INJECTION, SOLUTION SUBCUTANEOUS
Qty: 6 SYRINGE | Refills: 1 | Status: SHIPPED | OUTPATIENT
Start: 2021-08-26 | End: 2021-12-22 | Stop reason: DRUGHIGH

## 2021-09-01 RX ORDER — INSULIN GLARGINE 100 [IU]/ML
75 INJECTION, SOLUTION SUBCUTANEOUS
Qty: 67.5 ML | Refills: 3 | Status: SHIPPED | OUTPATIENT
Start: 2021-09-01 | End: 2021-11-30

## 2021-09-14 DIAGNOSIS — E11.65 TYPE 2 DIABETES MELLITUS WITH HYPERGLYCEMIA, UNSPECIFIED WHETHER LONG TERM INSULIN USE (HCC): Primary | ICD-10-CM

## 2021-09-14 DIAGNOSIS — E55.9 HYPOVITAMINOSIS D: ICD-10-CM

## 2021-09-15 ENCOUNTER — HOSPITAL ENCOUNTER (OUTPATIENT)
Dept: LAB | Age: 51
Discharge: HOME | End: 2021-09-15
Payer: COMMERCIAL

## 2021-09-15 ENCOUNTER — APPOINTMENT (OUTPATIENT)
Dept: FAMILY MEDICINE CLINIC | Age: 51
End: 2021-09-15

## 2021-09-15 DIAGNOSIS — E11.65 TYPE 2 DIABETES MELLITUS WITH HYPERGLYCEMIA, UNSPECIFIED WHETHER LONG TERM INSULIN USE (HCC): ICD-10-CM

## 2021-09-15 DIAGNOSIS — E55.9 HYPOVITAMINOSIS D: ICD-10-CM

## 2021-09-15 LAB
25(OH)D3 SERPL-MCNC: 18.7 NG/ML (ref 30–100)
ALBUMIN SERPL-MCNC: 4 G/DL (ref 3.4–5)
ALBUMIN/GLOB SERPL: 1 {RATIO} (ref 0.8–1.7)
ALP SERPL-CCNC: 68 U/L (ref 45–117)
ALT SERPL-CCNC: 40 U/L (ref 16–61)
ANION GAP SERPL CALC-SCNC: 4 MMOL/L (ref 3–18)
AST SERPL-CCNC: 26 U/L (ref 10–38)
BILIRUB SERPL-MCNC: 0.4 MG/DL (ref 0.2–1)
BUN SERPL-MCNC: 29 MG/DL (ref 7–18)
BUN/CREAT SERPL: 16 (ref 12–20)
CALCIUM SERPL-MCNC: 9.6 MG/DL (ref 8.5–10.1)
CHLORIDE SERPL-SCNC: 102 MMOL/L (ref 100–111)
CHOLEST SERPL-MCNC: 280 MG/DL
CO2 SERPL-SCNC: 31 MMOL/L (ref 21–32)
CREAT SERPL-MCNC: 1.86 MG/DL (ref 0.6–1.3)
ERYTHROCYTE [DISTWIDTH] IN BLOOD BY AUTOMATED COUNT: 12.6 % (ref 11.6–14.5)
EST. AVERAGE GLUCOSE BLD GHB EST-MCNC: 206 MG/DL
GLOBULIN SER CALC-MCNC: 4.1 G/DL (ref 2–4)
GLUCOSE SERPL-MCNC: 109 MG/DL (ref 74–99)
HBA1C MFR BLD: 8.8 % (ref 4.2–5.6)
HCT VFR BLD AUTO: 45.8 % (ref 36–48)
HDLC SERPL-MCNC: 37 MG/DL (ref 40–60)
HDLC SERPL: 7.6 {RATIO} (ref 0–5)
HGB BLD-MCNC: 14.7 G/DL (ref 13–16)
LDLC SERPL CALC-MCNC: 176.4 MG/DL (ref 0–100)
LIPID PROFILE,FLP: ABNORMAL
MCH RBC QN AUTO: 27.5 PG (ref 24–34)
MCHC RBC AUTO-ENTMCNC: 32.1 G/DL (ref 31–37)
MCV RBC AUTO: 85.8 FL (ref 78–100)
PLATELET # BLD AUTO: 255 K/UL (ref 135–420)
PMV BLD AUTO: 11.1 FL (ref 9.2–11.8)
POTASSIUM SERPL-SCNC: 4.6 MMOL/L (ref 3.5–5.5)
PROT SERPL-MCNC: 8.1 G/DL (ref 6.4–8.2)
RBC # BLD AUTO: 5.34 M/UL (ref 4.35–5.65)
SODIUM SERPL-SCNC: 137 MMOL/L (ref 136–145)
TRIGL SERPL-MCNC: 333 MG/DL (ref ?–150)
VLDLC SERPL CALC-MCNC: 66.6 MG/DL
WBC # BLD AUTO: 5.2 K/UL (ref 4.6–13.2)

## 2021-09-15 PROCEDURE — 36415 COLL VENOUS BLD VENIPUNCTURE: CPT

## 2021-09-15 PROCEDURE — 82306 VITAMIN D 25 HYDROXY: CPT

## 2021-09-15 PROCEDURE — 85027 COMPLETE CBC AUTOMATED: CPT

## 2021-09-15 PROCEDURE — 80061 LIPID PANEL: CPT

## 2021-09-15 PROCEDURE — 80053 COMPREHEN METABOLIC PANEL: CPT

## 2021-09-15 PROCEDURE — 83036 HEMOGLOBIN GLYCOSYLATED A1C: CPT

## 2021-09-22 ENCOUNTER — OFFICE VISIT (OUTPATIENT)
Dept: FAMILY MEDICINE CLINIC | Age: 51
End: 2021-09-22
Payer: COMMERCIAL

## 2021-09-22 VITALS
RESPIRATION RATE: 20 BRPM | BODY MASS INDEX: 42.8 KG/M2 | HEIGHT: 70 IN | OXYGEN SATURATION: 98 % | DIASTOLIC BLOOD PRESSURE: 68 MMHG | HEART RATE: 85 BPM | SYSTOLIC BLOOD PRESSURE: 112 MMHG | TEMPERATURE: 97 F | WEIGHT: 299 LBS

## 2021-09-22 DIAGNOSIS — I10 ESSENTIAL HYPERTENSION: Primary | ICD-10-CM

## 2021-09-22 DIAGNOSIS — E78.1 HYPERTRIGLYCERIDEMIA: ICD-10-CM

## 2021-09-22 DIAGNOSIS — E55.9 HYPOVITAMINOSIS D: ICD-10-CM

## 2021-09-22 DIAGNOSIS — E11.65 UNCONTROLLED TYPE 2 DIABETES MELLITUS WITH HYPERGLYCEMIA (HCC): ICD-10-CM

## 2021-09-22 PROCEDURE — 99214 OFFICE O/P EST MOD 30 MIN: CPT | Performed by: NURSE PRACTITIONER

## 2021-09-22 PROCEDURE — 3052F HG A1C>EQUAL 8.0%<EQUAL 9.0%: CPT | Performed by: NURSE PRACTITIONER

## 2021-09-22 RX ORDER — ERGOCALCIFEROL 1.25 MG/1
50000 CAPSULE ORAL
Qty: 4 CAPSULE | Refills: 2 | Status: SHIPPED | OUTPATIENT
Start: 2021-09-22 | End: 2021-10-04

## 2021-09-22 RX ORDER — TAMSULOSIN HYDROCHLORIDE 0.4 MG/1
0.4 CAPSULE ORAL DAILY
Qty: 900 CAPSULE | Refills: 1 | Status: SHIPPED | OUTPATIENT
Start: 2021-09-22 | End: 2022-06-21

## 2021-09-22 RX ORDER — ROSUVASTATIN CALCIUM 20 MG/1
20 TABLET, COATED ORAL
Qty: 90 TABLET | Refills: 0 | Status: SHIPPED | OUTPATIENT
Start: 2021-09-22 | End: 2021-10-04

## 2021-09-22 RX ORDER — CYCLOBENZAPRINE HCL 10 MG
TABLET ORAL
Qty: 21 TABLET | Refills: 0 | Status: SHIPPED | OUTPATIENT
Start: 2021-09-22 | End: 2022-02-03

## 2021-09-22 NOTE — PATIENT INSTRUCTIONS
Diabetes Sick-Day Plan: Care Instructions  Your Care Instructions     If you have diabetes, many other illnesses can make your blood sugar go up. This can be dangerous. When you are sick with the flu or another illness, your body releases hormones to fight infection. These hormones raise blood sugar levels. They also make it hard for insulin or other medicines to lower your blood sugar. Work with your doctor to make a plan for what to do on days when you are sick. Follow-up care is a key part of your treatment and safety. Be sure to make and go to all appointments, and call your doctor if you are having problems. It's also a good idea to know your test results and keep a list of the medicines you take. How can you care for yourself at home? · Work with your doctor to write up a sick-day plan for what to do on days when you are sick. Your blood sugar can go up or down, depending on your illness and whether you can keep food down. Call your doctor when you are sick. Ask if you need to adjust your pills or insulin. · Write down the diabetes medicines you have been taking and whether you have changed the dose based on your sick-day plan. Have this information ready when you call your doctor. · Eat your normal types and amounts of food. Drink extra fluids, such as water, broth, and fruit juice, to prevent dehydration. ? If your blood sugar level is higher than the blood sugar level your doctor recommends (for example, above 240 milligrams per deciliter [mg/dL]), drink extra liquids that don't contain sugar. Examples are water and sugar-free cola. ? If you can't eat your usual foods, then drink extra liquids, such as soup, sports drinks, or milk. You may also eat food that is gentle on the stomach. These foods include crackers, gelatin dessert, and applesauce. Try to eat or drink 50 grams of carbohydrates every 3 to 4 hours.  For example, 6 saltine crackers, 1 cup (8 ounces) of milk, and ½ cup (4 ounces) of orange juice each contain about 15 grams of carbohydrate. · Check your blood sugar at least every 3 to 4 hours. If it goes up fast, check it more often. And check it even through the night. Take insulin if your doctor told you to do so. If you and your doctor didn't have a sick-day plan for taking extra insulin, call your doctor for advice. · If you take insulin, check your urine or blood for ketones. This is even more important if your blood sugar is high. · Do not take any over-the-counter medicines, such as pain relievers, decongestants, or herbal products or other natural medicines, without talking with your doctor first.  · Do not drive. If you need to see your doctor or go anywhere else, ask a family member or friend to drive you. When should you call for help? Call 911 anytime you think you may need emergency care. For example, call if:    · You passed out (lost consciousness).     · You are confused or cannot think clearly.     · Your blood sugar is very high or very low. Watch closely for changes in your health, and be sure to contact your doctor if:    · Your blood sugar stays outside the level your doctor set for you.     · You have any problems. Where can you learn more? Go to http://www.gray.com/  Enter L970 in the search box to learn more about \"Diabetes Sick-Day Plan: Care Instructions. \"  Current as of: August 31, 2020               Content Version: 13.0  © 5821-4644 [employer]. Care instructions adapted under license by [employer] (which disclaims liability or warranty for this information). If you have questions about a medical condition or this instruction, always ask your healthcare professional. [address] any warranty or liability for your use of this information.

## 2021-09-22 NOTE — PROGRESS NOTES
Subjective:    Norma Hooper is a 49y.o. year old male seen for follow up of diabetes. He also has hypertension and hyperlipidemia. Diabetic Review of Systems - medication compliance: compliant most of the time, diabetic diet compliance: noncompliant some of the time, home glucose monitoring: is performed sporadically, fasting values range mid 100s, further diabetic ROS: no polyuria or polydipsia, no chest pain, dyspnea or TIA's, no numbness, tingling or pain in extremities, last eye exam 2/8/2021. Patient states he has been eating more sweets as of late. Other symptoms and concerns: patients he has been having urinary at night. Reports has not been taking flomax as prescribed. Patient Active Problem List   Diagnosis Code    Obesity, morbid ([facility] [address] E66.01     Current Outpatient Medications   Medication Sig Dispense Refill    insulin glargine (Basaglar KwikPen U-100 Insulin) 100 unit/mL (3 mL) inpn 75 Units by SubCUTAneous route nightly for 90 days. 14.4 mL 3    Trulicity 1.5 DL/4.2 mL sub-q pen INJECT 0.5 ML BY SUBCUTANEOUS ROUTE EVERY SEVEN (7) DAYS. 6 Syringe 1    Jardiance 25 mg tablet TAKE 1 TABLET BY MOUTH EVERY DAY 90 Tablet 1    flash glucose sensor (FreeStyle Jarad 14 Day Sensor) kit Apply new sensor every 14 days to monitor blood sugar as directed. Please dispense 2 sensors. 2 Kit 11    sildenafil citrate (VIAGRA) 100 mg tablet Take 1 Tablet by mouth as needed (45 mins prior to intercourse). 10 Tablet 1    lisinopril-hydroCHLOROthiazide (PRINZIDE, ZESTORETIC) 20-25 mg per tablet Take 1 Tablet by mouth daily. 90 Tablet 2    tamsulosin (Flomax) 0.4 mg capsule Take 1 Capsule by mouth daily.  900 Capsule 1    rosuvastatin (CRESTOR) 20 mg tablet TAKE 1 TABLET BY MOUTH EVERY DAY AT NIGHT (Patient not taking: Reported on 6/22/2021) 90 Tab 0    cyclobenzaprine (FLEXERIL) 10 mg tablet 1 tid prn back pain (Patient not taking: Reported on 6/22/2021) 21 Tab 0    Insulin Needles, Disposable, (BD Ultra-Fine Short Pen Needle) 31 gauge x 5/16\" ndle Inject 80 units by subcutaneous route every night. 1 Package 11    Blood Pressure Kit-Extra Large kit Take blood pressure as directed 1 Each 0    Blood-Glucose Transmitter (Dexcom G6 Transmitter) eliseo Use with Dexcom CGM to check blood sugar 4 or more times daily. 1 Device 2    Blood-Glucose Sensor (Dexcom G6 Sensor) eliseo Use to check blood sugar 4 or more times daily. 3 Device 11    Blood-Glucose Meter,Continuous (DEXCOM ) misc Use to check blood sugar 4 or more times daily. 1 Each 1    metFORMIN (GLUCOPHAGE) 1,000 mg tablet Take 1 Tab by mouth two (2) times daily (with meals). (Patient not taking: Reported on 6/22/2021) 60 Tab 3    flash glucose scanning reader (PhagenesisSTbOombate GERALD 14 DAY READER) misc Use to monitor blood sugar at least 3 times daily 1 Each 0    multivitamin (ONE A DAY) tablet Take 1 Tab by mouth daily. Allergies   Allergen Reactions    Pcn [Penicillins] Other (comments)     Unknown was told when a child     Past Surgical History:   Procedure Laterality Date    HX APPENDECTOMY       Family History   Problem Relation Age of Onset    Cancer Mother         unknown ?  breast    Heart Disease Father     No Known Problems Sister     Cancer Brother     No Known Problems Sister     No Known Problems Brother     No Known Problems Brother     Diabetes Daughter     Diabetes Daughter       Lab Results   Component Value Date/Time    Cholesterol, total 280 (H) 09/15/2021 08:07 AM    HDL Cholesterol 37 (L) 09/15/2021 08:07 AM    LDL, calculated 176.4 (H) 09/15/2021 08:07 AM    VLDL, calculated 66.6 09/15/2021 08:07 AM    Triglyceride 333 (H) 09/15/2021 08:07 AM    CHOL/HDL Ratio 7.6 (H) 09/15/2021 08:07 AM     Lab Results   Component Value Date/Time    Sodium 137 09/15/2021 08:07 AM    Potassium 4.6 09/15/2021 08:07 AM    Chloride 102 09/15/2021 08:07 AM    CO2 31 09/15/2021 08:07 AM    Anion gap 4 09/15/2021 08:07 AM    Glucose 109 (H) 09/15/2021 08:07 AM    BUN 29 (H) 09/15/2021 08:07 AM    Creatinine 1.86 (H) 09/15/2021 08:07 AM    BUN/Creatinine ratio 16 09/15/2021 08:07 AM    GFR est AA 47 (L) 09/15/2021 08:07 AM    GFR est non-AA 39 (L) 09/15/2021 08:07 AM    Calcium 9.6 09/15/2021 08:07 AM    Bilirubin, total 0.4 09/15/2021 08:07 AM    Alk.  phosphatase 68 09/15/2021 08:07 AM    Protein, total 8.1 09/15/2021 08:07 AM    Albumin 4.0 09/15/2021 08:07 AM    Globulin 4.1 (H) 09/15/2021 08:07 AM    A-G Ratio 1.0 09/15/2021 08:07 AM    ALT (SGPT) 40 09/15/2021 08:07 AM    AST (SGOT) 26 09/15/2021 08:07 AM     Lab Results   Component Value Date/Time    WBC 5.2 09/15/2021 08:07 AM    HGB 14.7 09/15/2021 08:07 AM    HCT 45.8 09/15/2021 08:07 AM    PLATELET 322 55/19/7020 08:07 AM    MCV 85.8 09/15/2021 08:07 AM     Lab Results   Component Value Date/Time    Hemoglobin A1c 8.8 (H) 09/15/2021 08:07 AM    Hemoglobin A1c (POC) 8.9 03/10/2020 07:30 AM     Lab Results   Component Value Date/Time    Microalbumin/Creat ratio (mg/g creat) 406 (H) 06/22/2021 02:20 PM    Microalbumin,urine random 33.30 (H) 06/22/2021 02:20 PM     Wt Readings from Last 3 Encounters:   06/22/21 306 lb 6.4 oz (139 kg)   10/22/20 312 lb (141.5 kg)   03/10/20 311 lb (141.1 kg)     Last Point of Care HGB A1C  Hemoglobin A1c (POC)   Date Value Ref Range Status   03/10/2020 8.9 % Final      BP Readings from Last 3 Encounters:   06/22/21 134/80   10/22/20 134/80   03/10/20 113/62       Results for orders placed or performed during the hospital encounter of 09/15/21   CBC W/O DIFF   Result Value Ref Range    WBC 5.2 4.6 - 13.2 K/uL    RBC 5.34 4.35 - 5.65 M/uL    HGB 14.7 13.0 - 16.0 g/dL    HCT 45.8 36.0 - 48.0 %    MCV 85.8 78.0 - 100.0 FL    MCH 27.5 24.0 - 34.0 PG    MCHC 32.1 31.0 - 37.0 g/dL    RDW 12.6 11.6 - 14.5 %    PLATELET 230 451 - 933 K/uL    MPV 11.1 9.2 - 11.8 FL   LIPID PANEL   Result Value Ref Range    LIPID PROFILE          Cholesterol, total 280 (H) <200 MG/DL Triglyceride 333 (H) <150 MG/DL    HDL Cholesterol 37 (L) 40 - 60 MG/DL    LDL, calculated 176.4 (H) 0 - 100 MG/DL    VLDL, calculated 66.6 MG/DL    CHOL/HDL Ratio 7.6 (H) 0 - 5.0     METABOLIC PANEL, COMPREHENSIVE   Result Value Ref Range    Sodium 137 136 - 145 mmol/L    Potassium 4.6 3.5 - 5.5 mmol/L    Chloride 102 100 - 111 mmol/L    CO2 31 21 - 32 mmol/L    Anion gap 4 3.0 - 18 mmol/L    Glucose 109 (H) 74 - 99 mg/dL    BUN 29 (H) 7.0 - 18 MG/DL    Creatinine 1.86 (H) 0.6 - 1.3 MG/DL    BUN/Creatinine ratio 16 12 - 20      GFR est AA 47 (L) >60 ml/min/1.73m2    GFR est non-AA 39 (L) >60 ml/min/1.73m2    Calcium 9.6 8.5 - 10.1 MG/DL    Bilirubin, total 0.4 0.2 - 1.0 MG/DL    ALT (SGPT) 40 16 - 61 U/L    AST (SGOT) 26 10 - 38 U/L    Alk. phosphatase 68 45 - 117 U/L    Protein, total 8.1 6.4 - 8.2 g/dL    Albumin 4.0 3.4 - 5.0 g/dL    Globulin 4.1 (H) 2.0 - 4.0 g/dL    A-G Ratio 1.0 0.8 - 1.7     VITAMIN D, 25 HYDROXY   Result Value Ref Range    Vitamin D 25-Hydroxy 18.7 (L) 30 - 100 ng/mL   HEMOGLOBIN A1C WITH EAG   Result Value Ref Range    Hemoglobin A1c 8.8 (H) 4.2 - 5.6 %    Est. average glucose 206 mg/dL       Last Diabetic Foot Exam on:   Diabetic Foot and Eye Exam HM Status   Topic Date Due    Diabetic Foot Care  06/22/2022    Eye Exam  02/08/2023       Objective:  Visit Vitals  /68 (BP 1 Location: Left arm, BP Patient Position: Sitting, BP Cuff Size: Adult)   Pulse 85   Temp 97 °F (36.1 °C) (Temporal)   Resp 20   Ht 5' 10\" (1.778 m)   Wt 299 lb (135.6 kg)   SpO2 98%   BMI 42.90 kg/m²     Awake and alert in no acute distress   Neck supple without lymphadenopathy, no carotid artery bruits auscultated bilaterally. No thyromegaly   Lungs clear throughout   S1 S2 RRR without ectopy or murmur auscultated.    Extremities: no clubbing, cyanosis, peripheral edema   Abdomen - soft, nontender, nondistended, no masses or organomegaly  Integumentary: no rashes   Reviewed vital signs     Assessment/Plan: ICD-10-CM ICD-9-CM    1. Essential hypertension  I10 401.9    2. Hypertriglyceridemia  C28.5 177.1 METABOLIC PANEL, COMPREHENSIVE      LIPID PANEL   3. Uncontrolled type 2 diabetes mellitus with hyperglycemia (HCC)  E11.65 250.02 REFERRAL TO PHARMACIST      HEMOGLOBIN A1C WITH EAG   4. Hypovitaminosis D  E55.9 268.9      Orders Placed This Encounter    METABOLIC PANEL, COMPREHENSIVE    HEMOGLOBIN A1C WITH EAG    LIPID PANEL    REFERRAL TO PHARMACIST HR FACILITIES    cyclobenzaprine (FLEXERIL) 10 mg tablet    ergocalciferol (ERGOCALCIFEROL) 1,250 mcg (50,000 unit) capsule    tamsulosin (Flomax) 0.4 mg capsule    rosuvastatin (CRESTOR) 20 mg tablet        Issues reviewed with him: low cholesterol diet, weight control and daily exercise discussed and diabetic Sick Day rules reviewed, handout given. I have discussed the diagnosis with the patient and the intended plan as seen in the above orders. The patient has received an after-visit summary and questions were answered concerning future plans. I have discussed medication side effects and warnings with the patient as well. Patient agreeable with above plan and verbalizes understanding. Follow-up and Dispositions    · Return in about 3 months (around 12/22/2021) for DM/HTN/HLD, in office follow up, fasting labs 1 wk prior.

## 2021-10-04 RX ORDER — ROSUVASTATIN CALCIUM 20 MG/1
TABLET, COATED ORAL
Qty: 90 TABLET | Refills: 0 | Status: SHIPPED | OUTPATIENT
Start: 2021-10-04 | End: 2022-04-13

## 2021-10-04 RX ORDER — ERGOCALCIFEROL 1.25 MG/1
50000 CAPSULE ORAL
Qty: 12 CAPSULE | Refills: 1 | Status: SHIPPED | OUTPATIENT
Start: 2021-10-04 | End: 2022-04-13

## 2021-10-12 ENCOUNTER — TELEPHONE (OUTPATIENT)
Dept: FAMILY MEDICINE CLINIC | Age: 51
End: 2021-10-12

## 2021-10-12 NOTE — TELEPHONE ENCOUNTER
Patient stated asked for a callback next week for an appointment. Patient states that he was busy and could not concentrate.

## 2021-11-06 ENCOUNTER — APPOINTMENT (OUTPATIENT)
Dept: CT IMAGING | Age: 51
End: 2021-11-06
Attending: EMERGENCY MEDICINE
Payer: COMMERCIAL

## 2021-11-06 ENCOUNTER — APPOINTMENT (OUTPATIENT)
Dept: GENERAL RADIOLOGY | Age: 51
End: 2021-11-06
Attending: EMERGENCY MEDICINE
Payer: COMMERCIAL

## 2021-11-06 ENCOUNTER — HOSPITAL ENCOUNTER (EMERGENCY)
Age: 51
Discharge: OTHER INSTITUTION | End: 2021-11-06
Attending: EMERGENCY MEDICINE
Payer: COMMERCIAL

## 2021-11-06 VITALS
WEIGHT: 310 LBS | OXYGEN SATURATION: 98 % | HEIGHT: 70 IN | DIASTOLIC BLOOD PRESSURE: 71 MMHG | TEMPERATURE: 98.2 F | RESPIRATION RATE: 17 BRPM | HEART RATE: 92 BPM | BODY MASS INDEX: 44.38 KG/M2 | SYSTOLIC BLOOD PRESSURE: 131 MMHG

## 2021-11-06 DIAGNOSIS — I62.9 INTRACRANIAL HEMORRHAGE (HCC): Primary | ICD-10-CM

## 2021-11-06 LAB
ALBUMIN SERPL-MCNC: 4.2 G/DL (ref 3.4–5)
ALBUMIN/GLOB SERPL: 0.9 {RATIO} (ref 0.8–1.7)
ALP SERPL-CCNC: 72 U/L (ref 45–117)
ALT SERPL-CCNC: 41 U/L (ref 16–61)
ANION GAP SERPL CALC-SCNC: 7 MMOL/L (ref 3–18)
AST SERPL-CCNC: 29 U/L (ref 10–38)
BASOPHILS # BLD: 0 K/UL (ref 0–0.1)
BASOPHILS NFR BLD: 0 % (ref 0–2)
BILIRUB SERPL-MCNC: 0.3 MG/DL (ref 0.2–1)
BUN SERPL-MCNC: 37 MG/DL (ref 7–18)
BUN/CREAT SERPL: 22 (ref 12–20)
CALCIUM SERPL-MCNC: 9.4 MG/DL (ref 8.5–10.1)
CHLORIDE SERPL-SCNC: 102 MMOL/L (ref 100–111)
CO2 SERPL-SCNC: 31 MMOL/L (ref 21–32)
CREAT SERPL-MCNC: 1.67 MG/DL (ref 0.6–1.3)
DIFFERENTIAL METHOD BLD: NORMAL
EOSINOPHIL # BLD: 0.1 K/UL (ref 0–0.4)
EOSINOPHIL NFR BLD: 1 % (ref 0–5)
ERYTHROCYTE [DISTWIDTH] IN BLOOD BY AUTOMATED COUNT: 12.2 % (ref 11.6–14.5)
GLOBULIN SER CALC-MCNC: 4.5 G/DL (ref 2–4)
GLUCOSE SERPL-MCNC: 116 MG/DL (ref 74–99)
HCT VFR BLD AUTO: 44.6 % (ref 36–48)
HGB BLD-MCNC: 14.4 G/DL (ref 13–16)
INR PPP: 1 (ref 0.8–1.2)
LYMPHOCYTES # BLD: 2.4 K/UL (ref 0.9–3.6)
LYMPHOCYTES NFR BLD: 44 % (ref 21–52)
MCH RBC QN AUTO: 27.3 PG (ref 24–34)
MCHC RBC AUTO-ENTMCNC: 32.3 G/DL (ref 31–37)
MCV RBC AUTO: 84.5 FL (ref 78–100)
MONOCYTES # BLD: 0.5 K/UL (ref 0.05–1.2)
MONOCYTES NFR BLD: 9 % (ref 3–10)
NEUTS SEG # BLD: 2.4 K/UL (ref 1.8–8)
NEUTS SEG NFR BLD: 44 % (ref 40–73)
PLATELET # BLD AUTO: 259 K/UL (ref 135–420)
PMV BLD AUTO: 10.1 FL (ref 9.2–11.8)
POTASSIUM SERPL-SCNC: 4.4 MMOL/L (ref 3.5–5.5)
PROT SERPL-MCNC: 8.7 G/DL (ref 6.4–8.2)
PROTHROMBIN TIME: 12.6 SEC (ref 11.5–15.2)
RBC # BLD AUTO: 5.28 M/UL (ref 4.35–5.65)
SODIUM SERPL-SCNC: 140 MMOL/L (ref 136–145)
WBC # BLD AUTO: 5.4 K/UL (ref 4.6–13.2)

## 2021-11-06 PROCEDURE — 72220 X-RAY EXAM SACRUM TAILBONE: CPT

## 2021-11-06 PROCEDURE — 72125 CT NECK SPINE W/O DYE: CPT

## 2021-11-06 PROCEDURE — 70450 CT HEAD/BRAIN W/O DYE: CPT

## 2021-11-06 PROCEDURE — 85025 COMPLETE CBC W/AUTO DIFF WBC: CPT

## 2021-11-06 PROCEDURE — 74011250637 HC RX REV CODE- 250/637: Performed by: EMERGENCY MEDICINE

## 2021-11-06 PROCEDURE — 80053 COMPREHEN METABOLIC PANEL: CPT

## 2021-11-06 PROCEDURE — 74011250637 HC RX REV CODE- 250/637

## 2021-11-06 PROCEDURE — 99284 EMERGENCY DEPT VISIT MOD MDM: CPT

## 2021-11-06 PROCEDURE — 85610 PROTHROMBIN TIME: CPT

## 2021-11-06 RX ORDER — TRAMADOL HYDROCHLORIDE 50 MG/1
TABLET ORAL
Status: COMPLETED
Start: 2021-11-06 | End: 2021-11-06

## 2021-11-06 RX ORDER — TRAMADOL HYDROCHLORIDE 50 MG/1
100 TABLET ORAL
Status: COMPLETED | OUTPATIENT
Start: 2021-11-06 | End: 2021-11-06

## 2021-11-06 RX ADMIN — TRAMADOL HYDROCHLORIDE 100 MG: 50 TABLET, COATED ORAL at 01:44

## 2021-11-06 RX ADMIN — TRAMADOL HYDROCHLORIDE 100 MG: 50 TABLET, COATED ORAL at 01:36

## 2021-11-06 NOTE — ED PROVIDER NOTES
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 .  He slipped on the wet floor and fell hitting his head. His head  went through a sheetrock wall injurink. Incident occurred 5 hours prior to coming to the emergency room. Patient complaint having a dull headache and mild lower tailbone pain. No other complaints. Past Medical History:   Diagnosis Date    Diabetes ([address]     Hypercholesterolemia     Hypertension        Past Surgical History:   Procedure Laterality Date    HX APPENDECTOMY           Family History:   Problem Relation Age of Onset    Cancer Mother         unknown ? breast    Heart Disease Father     No Known Problems Sister     Cancer Brother     No Known Problems Sister     No Known Problems Brother     No Known Problems Brother     Diabetes Daughter     Diabetes Daughter        Social History     Socioeconomic History    Marital status:      Spouse name: Not on file    Number of children: Not on file    Years of education: Not on file    Highest education level: Not on file   Occupational History    Not on file   Tobacco Use    Smoking status: Never Smoker    Smokeless tobacco: Never Used   Substance and Sexual Activity    Alcohol use: Yes     Comment: Occassional use    Drug use: Not on file     Comment: Occassional use    Sexual activity: Yes     Partners: Female     Birth control/protection: None   Other Topics Concern    Not on file   Social History Narrative    Not on file     Social Determinants of Health     Financial Resource Strain:     Difficulty of Paying Living Expenses: Not on file   Food Insecurity:     Worried About Running Out of Food in the Last Year: Not on file    Justin of Food in the Last Year: Not on file   Transportation Needs:     Lack of Transportation (Medical): Not on file    Lack of Transportation (Non-Medical):  Not on file   Physical Activity:     Days of Exercise per Week: Not on file    Minutes of Exercise per Session: Not on file   Stress:     Feeling of Stress : Not on file   Social Connections:     Frequency of Communication with Friends and Family: Not on file    Frequency of Social Gatherings with Friends and Family: Not on file    Attends Denominational Services: Not on file    Active Member of Clubs or Organizations: Not on file    Attends Club or Organization Meetings: Not on file    Marital Status: Not on file   Intimate Partner Violence:     Fear of Current or Ex-Partner: Not on file    Emotionally Abused: Not on file    Physically Abused: Not on file    Sexually Abused: Not on file   Housing Stability:     Unable to Pay for Housing in the Last Year: Not on file    Number of [place] in the Last Year: Not on file    Unstable Housing in the Last Year: Not on file         ALLERGIES: Pcn [penicillins]    Review of Systems   Constitutional: Negative. HENT: Negative. Eyes: Negative. Respiratory: Negative. Cardiovascular: Negative. Gastrointestinal: Negative. Endocrine: Negative. Genitourinary: Negative. Musculoskeletal: Negative. Skin: Negative. Allergic/Immunologic: Negative. Neurological: Negative. Hematological: Negative. Psychiatric/Behavioral: Negative. All other systems reviewed and are negative. Vitals:    11/06/21 0053   BP: 120/77   Pulse: 87   Resp: 18   Temp: 98.4 °F (36.9 °C)   SpO2: 98%   Weight: 140.6 kg (310 lb)   Height: 5' 10\" (1.778 m)            Physical Exam  Vitals and nursing note reviewed. Constitutional:       General: He is not in acute distress. Appearance: He is well-developed. HENT:      Head: Normocephalic. Comments: (+) 0.5 mm abrasion over anterior scalp  Eyes:      Conjunctiva/sclera: Conjunctivae normal.      Pupils: Pupils are equal, round, and reactive to light. Cardiovascular:      Rate and Rhythm: Normal rate and regular rhythm. Heart sounds: Normal heart sounds.  No murmur heard.      Pulmonary:      Effort: Pulmonary effort is normal. No respiratory distress. Breath sounds: Normal breath sounds. No wheezing or rales. Chest:      Chest wall: No tenderness. Abdominal:      General: Bowel sounds are normal. There is no distension. Palpations: Abdomen is soft. Tenderness: There is no abdominal tenderness. There is no rebound. Musculoskeletal:         General: Normal range of motion. Cervical back: Normal range of motion and neck supple. Tenderness present. No rigidity. Skin:     General: Skin is warm and dry. Findings: No rash. Neurological:      Mental Status: He is alert and oriented to person, place, and time. Cranial Nerves: No cranial nerve deficit. Motor: No abnormal muscle tone. Coordination: Coordination normal.   Psychiatric:         Behavior: Behavior normal.         Thought Content: Thought content normal.         Judgment: Judgment normal.          [facility]  ED Course as of 11/06/21 0253   Sat Nov 06, 2021   0236 CT HEAD WO CONT [KB]   0239 CT HEAD WO CONT [KB]      ED Course User Index  [KB] Gaurav Palacios MD     CT scan of head, c-spine  - (+) moderate subcutaneous hematoma overlying the occiput. Procedures     CRITICAL CARE:  3:40 AM  I have spent 31 minutes of critical care time involved in lab review, consultations with specialist, family decision-making, and documentation. During this entire length of time I was immediately available to the patient. Critical Care: The reason for providing this level of medical care for this critically ill patient was due a critical illness that impaired one or more vital organ systems such that there was a high probability of imminent or life threatening deterioration in the patients condition.  This care involved high complexity decision making to assess, manipulate, and support vital system functions, to treat this degreee vital organ system failure and to prevent further life threatening deterioration of the patients condition. Dx:occipital subcutaneous hematoma, cervical strain and lumbar strain    2:45 am, Case discsussed with patient. He was told that he has a brain bleed and that he need to be transfer to another ER facility that handles brain trauma. He and his girlfriend was told of the serious of his condition    Dx: acute intracranial hemorrhage    Consultation: Trauma surgeon  Dr. Jericho Brasher - accepted patient for transfer. 3:50 am, trauma center call and arrangement had been made for the ER attendant to accept patient. Consultation: ER attendant: Dr. Fallon Sánchez. He accepted patient for transfer. Dictation disclaimer:  Please note that this dictation was completed with [employer], the computer voice recognition software. Quite often unanticipated grammatical, syntax, homophones, and other interpretive errors are inadvertently transcribed by the computer software. Please disregard these errors. Please excuse any errors that have escaped final proofreading.

## 2021-11-06 NOTE — ED NOTES
Patient possible eloped, Charge nurse went to look at the parking lot, patient nowhere to be found. MD Eura Hammans aware.

## 2021-11-06 NOTE — ED NOTES
Patient came back. Ambulatory, not in any acute distress. Progress Note


Date of Service: 03/07/19


Note: 


BERONICA COLLINS was visited. Therapy notes read and reviewed. I met with Beronica 

and her family as well as with the therapy team. She will be able to go home 

tomorrow as she is doing quite well. She was able to have a BM 


 





Current Medications: 


 Active Medications











Generic Name Dose Route Start Last Admin





  Trade Name Freq  PRN Reason Stop Dose Admin


 


Acetaminophen  650 mg  03/03/19 09:45  03/07/19 00:20





  Tylenol Tab*  PO   650 mg





  Q4H PRN   Administration





  FEVER > 101   





     





     





     


 


Al Hydrox/Mg Hydrox/Simethicone  30 ml  03/03/19 09:45  





  Maalox Plus*  PO   





  Q6H PRN   





  INDIGESTION   





     





     





     


 


Ascorbic Acid  250 mg  03/04/19 09:00  03/07/19 10:01





  Vitamin C  Tab*  PO   250 mg





  DAILY FARZANEH   Administration





     





     





     





     


 


Atorvastatin Calcium  10 mg  03/03/19 17:00  03/07/19 16:32





  Lipitor*  PO   10 mg





  1700 FARZANEH   Administration





     





     





     





     


 


Benzonatate  100 mg  03/03/19 10:01  





  Tessalon Cap*  PO   





  TID PRN   





  COUGH   





     





     





     


 


Bisacodyl  10 mg  03/03/19 09:45  





  Dulcolax Supp*  MA   





  DAILY PRN   





  CONSTIPATION   





     





     





     


 


Calcium/Vitamin D  1 tab  03/03/19 21:00  03/07/19 10:01





  Oscal D Tab 250/125*  PO   1 tab





  BID FARZANEH   Administration





     





     





     





     


 


Docusate Sodium  100 mg  03/03/19 21:00  03/07/19 10:01





  Colace Cap*  PO   100 mg





  BID FARZANEH   Administration





     





     





     





     


 


Enoxaparin Sodium  40 mg  03/06/19 09:00  03/07/19 10:04





  Lovenox(*)  SUBCUT   40 mg





  Q24HR FARZANEH   Administration





     





     





     





     


 


Fluticasone Propionate  2 spray  03/05/19 21:00  03/07/19 10:00





  Flonase Nasal Spray 50mcg*  BOTH NARES   2 spray





  BID FARZANEH   Administration





     





     





     





     


 


Guaifenesin  600 mg  03/06/19 21:00  03/07/19 10:00





  Mucinex*  PO   600 mg





  BID FARZANEH   Administration





     





     





     





     


 


Magnesium Hydroxide  30 ml  03/03/19 09:45  





  Milk Of Magnesia Liq*  PO   





  Q6H PRN   





  CONSTIPATION   





     





     





     


 


Multivitamins  1 tab  03/04/19 09:00  03/07/19 10:00





  Prenatal Vitamin Tab*  PO   1 tab





  DAILY FARZANEH   Administration





     





     





     





     


 


Multivitamins/Minerals  1 tab  03/04/19 09:00  03/07/19 10:00





  Theragran/Minerals Tab*  PO   1 tab





  DAILY FARZANEH   Administration





     





     





     





     


 


Pto: (Claritin D 120  120 mg  03/07/19 09:00  03/07/19 10:00





  Mg)  PO   120 mg





  BID FARZANEH   Administration





     





     





     





     


 


Senna  2 tab  03/03/19 09:45  03/06/19 19:09





  Senokot Tab*  PO   2 tab





  BEDTIME PRN   Administration





  CONSTIPATION   





     





     





     


 


Tramadol HCl  50 mg  03/03/19 10:00  03/07/19 16:32





  Ultram*  PO   50 mg





  Q6H PRN   Administration





  PAIN   





     





     





     


 


Umeclidinium/Vilanterol  1 inh  03/04/19 09:00  03/07/19 10:03





  Anoro 62.5/25 Ellipta Device (Nf)  INH   1 inh





  DAILY FARZANEH   Administration





     





     





  Protocol   





     











Vital Signs: 


 Vital Signs











Temp Pulse Resp BP Pulse Ox


 


 98.1 F   109   18   105/67   99 


 


 03/07/19 16:28  03/07/19 16:28  03/07/19 16:32  03/07/19 16:28  03/07/19 17:21











Exam: 





LUNGS: Clear bilaterally


HEART: regular rhythm


ABDOMEN: Soft


EXTREMITIES: RLE Wound clean


NEUROLOGIC: A & O x 3. Sensation intact. Muscle strength 5/5 UE, RLE at least 4/

5


Assessment/Plan: 





1. Right Hip Fracture: WBAT. Follow up with Dr. Velasquez


2. Hypotension: Monitor


3. DVT Prophylaxis: Lovenox for 4 weeks post-op


4. Advanced Directives: Full code. Daughter in law is HCP


5. COPD: Anoro/Flonase


6. Chronic Cough: has been looked at and a variety of medications have been 

tried. 


7. Urinary Incontinence: Predates her fall. Bladder scan showed no PVR


























03/07/19 19:57

## 2021-11-06 NOTE — ED TRIAGE NOTES
Alert and oriented male s/p slip and fall from standing position. Struck head into wall, and landed on back. Possible LOC. C/O pain in head, neck and tailbone.

## 2021-11-06 NOTE — ED NOTES
Dr. Izabel Gallegos spoke to the patient about the Head CT result (bleed), awake alert, not in any acute distress. As per MD, pt ambulatory walked out of the room, told MD that he's going to comeback. none

## 2021-11-06 NOTE — ED NOTES
TRANSFER - OUT REPORT:    Telephone Verbal report given to Yulissa William RN(name) on Richy Waite.  being transferred to [facility] -ED(unit) for routine progression of care       Report consisted of patients Situation, Background, Assessment and   Recommendations(SBAR). Information from the following report(s) SBAR, Kardex, ED Summary, Procedure Summary, Intake/Output, MAR, Accordion and Recent Results was reviewed with the receiving nurse. Opportunity for questions and clarification was provided.       Patient transported with:   Monitor  [facility]

## 2021-12-10 ENCOUNTER — TELEPHONE (OUTPATIENT)
Dept: FAMILY MEDICINE CLINIC | Age: 51
End: 2021-12-10

## 2021-12-10 NOTE — TELEPHONE ENCOUNTER
----- Message from Clayton Mccabe sent at 12/8/2021 12:33 PM EST -----  Subject: Message to Provider    QUESTIONS  Information for Provider? pt is calling about FMLA paperwork from his last   appt. Please call pt to discuss   ---------------------------------------------------------------------------  --------------  CALL BACK INFO  What is the best way for the office to contact you? OK to leave message on   voicemail  Preferred Call Back Phone Number? [phone]  ---------------------------------------------------------------------------  --------------  SCRIPT ANSWERS  Relationship to Patient?  Self

## 2021-12-10 NOTE — TELEPHONE ENCOUNTER
Me  to Jaquelin WINTER      10/4/21 1:59 PM  Effective 8/1/2021 there will be a $20 to $35 fee for completion of forms. This fee will need to be paid in full prior to the provider starting on the paperwork. Also, there will still be a timeline of 7-10 business days for completion of forms.      Thanks    This [employer] message has not been read.

## 2021-12-13 ENCOUNTER — APPOINTMENT (OUTPATIENT)
Dept: PHYSICAL THERAPY | Age: 51
End: 2021-12-13

## 2021-12-15 ENCOUNTER — APPOINTMENT (OUTPATIENT)
Dept: FAMILY MEDICINE CLINIC | Age: 51
End: 2021-12-15

## 2021-12-15 ENCOUNTER — HOSPITAL ENCOUNTER (OUTPATIENT)
Dept: LAB | Age: 51
Discharge: HOME | End: 2021-12-15
Payer: COMMERCIAL

## 2021-12-15 DIAGNOSIS — E78.1 HYPERTRIGLYCERIDEMIA: ICD-10-CM

## 2021-12-15 DIAGNOSIS — E11.65 UNCONTROLLED TYPE 2 DIABETES MELLITUS WITH HYPERGLYCEMIA (HCC): ICD-10-CM

## 2021-12-15 LAB
ALBUMIN SERPL-MCNC: 4.3 G/DL (ref 3.4–5)
ALBUMIN/GLOB SERPL: 1.2 {RATIO} (ref 0.8–1.7)
ALP SERPL-CCNC: 71 U/L (ref 45–117)
ALT SERPL-CCNC: 46 U/L (ref 16–61)
ANION GAP SERPL CALC-SCNC: 6 MMOL/L (ref 3–18)
AST SERPL-CCNC: 28 U/L (ref 10–38)
BILIRUB SERPL-MCNC: 0.4 MG/DL (ref 0.2–1)
BUN SERPL-MCNC: 36 MG/DL (ref 7–18)
BUN/CREAT SERPL: 19 (ref 12–20)
CALCIUM SERPL-MCNC: 9.4 MG/DL (ref 8.5–10.1)
CHLORIDE SERPL-SCNC: 100 MMOL/L (ref 100–111)
CHOLEST SERPL-MCNC: 176 MG/DL
CO2 SERPL-SCNC: 31 MMOL/L (ref 21–32)
CREAT SERPL-MCNC: 1.88 MG/DL (ref 0.6–1.3)
EST. AVERAGE GLUCOSE BLD GHB EST-MCNC: 235 MG/DL
GLOBULIN SER CALC-MCNC: 3.7 G/DL (ref 2–4)
GLUCOSE SERPL-MCNC: 168 MG/DL (ref 74–99)
HBA1C MFR BLD: 9.8 % (ref 4.2–5.6)
HDLC SERPL-MCNC: 36 MG/DL (ref 40–60)
HDLC SERPL: 4.9 {RATIO} (ref 0–5)
LDLC SERPL CALC-MCNC: 83.8 MG/DL (ref 0–100)
LIPID PROFILE,FLP: ABNORMAL
POTASSIUM SERPL-SCNC: 4.8 MMOL/L (ref 3.5–5.5)
PROT SERPL-MCNC: 8 G/DL (ref 6.4–8.2)
SODIUM SERPL-SCNC: 137 MMOL/L (ref 136–145)
TRIGL SERPL-MCNC: 281 MG/DL (ref ?–150)
VLDLC SERPL CALC-MCNC: 56.2 MG/DL

## 2021-12-15 PROCEDURE — 83036 HEMOGLOBIN GLYCOSYLATED A1C: CPT

## 2021-12-15 PROCEDURE — 80053 COMPREHEN METABOLIC PANEL: CPT

## 2021-12-15 PROCEDURE — 80061 LIPID PANEL: CPT

## 2021-12-22 ENCOUNTER — OFFICE VISIT (OUTPATIENT)
Dept: FAMILY MEDICINE CLINIC | Age: 51
End: 2021-12-22
Payer: COMMERCIAL

## 2021-12-22 VITALS
SYSTOLIC BLOOD PRESSURE: 120 MMHG | DIASTOLIC BLOOD PRESSURE: 69 MMHG | WEIGHT: 305 LBS | HEIGHT: 70 IN | HEART RATE: 93 BPM | BODY MASS INDEX: 43.67 KG/M2 | OXYGEN SATURATION: 98 % | TEMPERATURE: 97.8 F | RESPIRATION RATE: 20 BRPM

## 2021-12-22 DIAGNOSIS — E55.9 HYPOVITAMINOSIS D: ICD-10-CM

## 2021-12-22 DIAGNOSIS — Z12.12 SCREENING FOR COLORECTAL CANCER: ICD-10-CM

## 2021-12-22 DIAGNOSIS — R10.10 UPPER ABDOMINAL PAIN, UNSPECIFIED: ICD-10-CM

## 2021-12-22 DIAGNOSIS — E78.1 HYPERTRIGLYCERIDEMIA: ICD-10-CM

## 2021-12-22 DIAGNOSIS — I10 ESSENTIAL HYPERTENSION: ICD-10-CM

## 2021-12-22 DIAGNOSIS — Z12.11 SCREENING FOR COLORECTAL CANCER: ICD-10-CM

## 2021-12-22 DIAGNOSIS — E11.65 UNCONTROLLED TYPE 2 DIABETES MELLITUS WITH HYPERGLYCEMIA (HCC): Primary | ICD-10-CM

## 2021-12-22 PROCEDURE — 99214 OFFICE O/P EST MOD 30 MIN: CPT | Performed by: NURSE PRACTITIONER

## 2021-12-22 RX ORDER — EMPAGLIFLOZIN 25 MG/1
TABLET, FILM COATED ORAL
Qty: 90 TABLET | Refills: 1 | Status: SHIPPED | OUTPATIENT
Start: 2021-12-22 | End: 2022-05-04 | Stop reason: SDUPTHER

## 2021-12-22 RX ORDER — INSULIN GLARGINE 100 [IU]/ML
60 INJECTION, SOLUTION SUBCUTANEOUS DAILY
Qty: 20 ADJUSTABLE DOSE PRE-FILLED PEN SYRINGE | Refills: 2 | Status: SHIPPED | OUTPATIENT
Start: 2021-12-22 | End: 2021-12-22

## 2021-12-22 RX ORDER — SILDENAFIL 100 MG/1
100 TABLET, FILM COATED ORAL AS NEEDED
Qty: 10 TABLET | Refills: 1 | Status: SHIPPED | OUTPATIENT
Start: 2021-12-22 | End: 2022-05-04 | Stop reason: SDUPTHER

## 2021-12-22 RX ORDER — DULAGLUTIDE 3 MG/.5ML
3 INJECTION, SOLUTION SUBCUTANEOUS
Qty: 12 EACH | Refills: 1 | Status: SHIPPED | OUTPATIENT
Start: 2021-12-22 | End: 2022-05-04 | Stop reason: SDUPTHER

## 2021-12-22 RX ORDER — INSULIN GLARGINE 100 [IU]/ML
60 INJECTION, SOLUTION SUBCUTANEOUS DAILY
Qty: 20 ADJUSTABLE DOSE PRE-FILLED PEN SYRINGE | Refills: 2 | Status: SHIPPED | OUTPATIENT
Start: 2021-12-22 | End: 2022-03-22

## 2021-12-22 RX ORDER — INSULIN GLARGINE 100 [IU]/ML
INJECTION, SOLUTION SUBCUTANEOUS
COMMUNITY
End: 2021-12-22 | Stop reason: SDUPTHER

## 2021-12-22 NOTE — PROGRESS NOTES
Subjective:    Barb Martinez is a 49y.o. year old male seen for follow up of diabetes. He also has hypertension and hyperlipidemia. Diabetic Review of Systems - medication compliance: compliant all of the time, states he has been taking 60-70 units, has been taking this range for awhile for the last several months, diabetic diet compliance: noncompliant some of the time, home glucose monitoring: is performed regularly, nonfasting values range 110-160/180, 200 after eating, further diabetic ROS: no polyuria or polydipsia, no chest pain, dyspnea or TIA's, no numbness, tingling or pain in extremities. Other symptoms and concerns: patient states since he began taking his medication after getting off of work in the morning between [id number] and has noticed urinary urgency has improved. 9/22/2021  Diabetic Review of Systems - medication compliance: compliant most of the time, diabetic diet compliance: noncompliant some of the time, home glucose monitoring: is performed sporadically, fasting values range mid 100s, further diabetic ROS: no polyuria or polydipsia, no chest pain, dyspnea or TIA's, no numbness, tingling or pain in extremities, last eye exam 2/8/2021. Patient states he has been eating more sweets as of late. Other symptoms and concerns: patients he has been having urinary at night. Reports has not been taking flomax as prescribed. Patient Active Problem List   Diagnosis Code    Obesity, morbid ([facility]) E66.01     Current Outpatient Medications   Medication Sig Dispense Refill    Jardiance 25 mg tablet TAKE 1 TABLET BY MOUTH EVERY DAY 90 Tablet 1    ergocalciferol (ERGOCALCIFEROL) 1,250 mcg (50,000 unit) capsule TAKE 1 CAPSULE BY MOUTH EVERY SEVEN (7) DAYS.  12 Capsule 1    rosuvastatin (CRESTOR) 20 mg tablet TAKE 1 TABLET BY MOUTH EVERY DAY AT NIGHT 90 Tablet 0    cyclobenzaprine (FLEXERIL) 10 mg tablet 1 tid prn back pain 21 Tablet 0    tamsulosin (Flomax) 0.4 mg capsule Take 1 Capsule by mouth daily. 900 Capsule 1    Trulicity 1.5 YJ/1.8 mL sub-q pen INJECT 0.5 ML BY SUBCUTANEOUS ROUTE EVERY SEVEN (7) DAYS. 6 Syringe 1    flash glucose sensor (FreeStyle Jarad 14 Day Sensor) kit Apply new sensor every 14 days to monitor blood sugar as directed. Please dispense 2 sensors. 2 Kit 11    sildenafil citrate (VIAGRA) 100 mg tablet Take 1 Tablet by mouth as needed (45 mins prior to intercourse). 10 Tablet 1    lisinopril-hydroCHLOROthiazide (PRINZIDE, ZESTORETIC) 20-25 mg per tablet Take 1 Tablet by mouth daily. 90 Tablet 2    Insulin Needles, Disposable, (BD Ultra-Fine Short Pen Needle) 31 gauge x 5/16\" ndle Inject 80 units by subcutaneous route every night. 1 Package 11    Blood Pressure Kit-Extra Large kit Take blood pressure as directed 1 Each 0    Blood-Glucose Transmitter (Dexcom G6 Transmitter) eliseo Use with Dexcom CGM to check blood sugar 4 or more times daily. 1 Device 2    Blood-Glucose Sensor (Dexcom G6 Sensor) eliseo Use to check blood sugar 4 or more times daily. 3 Device 11    Blood-Glucose Meter,Continuous (DEXCOM ) misc Use to check blood sugar 4 or more times daily. 1 Each 1    flash glucose scanning reader (FREESTYLE JARAD 14 DAY READER) misc Use to monitor blood sugar at least 3 times daily 1 Each 0    multivitamin (ONE A DAY) tablet Take 1 Tab by mouth daily. Allergies   Allergen Reactions    Pcn [Penicillins] Other (comments)     Unknown was told when a child     Past Surgical History:   Procedure Laterality Date    HX APPENDECTOMY       Family History   Problem Relation Age of Onset    Cancer Mother         unknown ?  breast    Heart Disease Father     No Known Problems Sister     Cancer Brother     No Known Problems Sister     No Known Problems Brother     No Known Problems Brother     Diabetes Daughter     Diabetes Daughter       Lab Results   Component Value Date/Time    Cholesterol, total 176 12/15/2021 08:15 AM    HDL Cholesterol 36 (L) 12/15/2021 08:15 AM    LDL, calculated 83.8 12/15/2021 08:15 AM    VLDL, calculated 56.2 12/15/2021 08:15 AM    Triglyceride 281 (H) 12/15/2021 08:15 AM    CHOL/HDL Ratio 4.9 12/15/2021 08:15 AM     Lab Results   Component Value Date/Time    Sodium 137 12/15/2021 08:15 AM    Potassium 4.8 12/15/2021 08:15 AM    Chloride 100 12/15/2021 08:15 AM    CO2 31 12/15/2021 08:15 AM    Anion gap 6 12/15/2021 08:15 AM    Glucose 168 (H) 12/15/2021 08:15 AM    BUN 36 (H) 12/15/2021 08:15 AM    Creatinine 1.88 (H) 12/15/2021 08:15 AM    BUN/Creatinine ratio 19 12/15/2021 08:15 AM    GFR est AA 46 (L) 12/15/2021 08:15 AM    GFR est non-AA 38 (L) 12/15/2021 08:15 AM    Calcium 9.4 12/15/2021 08:15 AM    Bilirubin, total 0.4 12/15/2021 08:15 AM    Alk.  phosphatase 71 12/15/2021 08:15 AM    Protein, total 8.0 12/15/2021 08:15 AM    Albumin 4.3 12/15/2021 08:15 AM    Globulin 3.7 12/15/2021 08:15 AM    A-G Ratio 1.2 12/15/2021 08:15 AM    ALT (SGPT) 46 12/15/2021 08:15 AM    AST (SGOT) 28 12/15/2021 08:15 AM     Lab Results   Component Value Date/Time    WBC 5.4 11/06/2021 03:35 AM    HGB 14.4 11/06/2021 03:35 AM    HCT 44.6 11/06/2021 03:35 AM    PLATELET 712 39/76/1675 03:35 AM    MCV 84.5 11/06/2021 03:35 AM     Lab Results   Component Value Date/Time    Hemoglobin A1c 9.8 (H) 12/15/2021 08:15 AM    Hemoglobin A1c (POC) 8.9 03/10/2020 07:30 AM     Lab Results   Component Value Date/Time    Microalbumin/Creat ratio (mg/g creat) 406 (H) 06/22/2021 02:20 PM    Microalbumin,urine random 33.30 (H) 06/22/2021 02:20 PM     Wt Readings from Last 3 Encounters:   11/06/21 310 lb (140.6 kg)   09/22/21 299 lb (135.6 kg)   06/22/21 306 lb 6.4 oz (139 kg)     Last Point of Care HGB A1C  Hemoglobin A1c (POC)   Date Value Ref Range Status   03/10/2020 8.9 % Final      BP Readings from Last 3 Encounters:   11/06/21 131/71   09/22/21 112/68   06/22/21 134/80     Results for orders placed or performed during the hospital encounter of 12/15/21 METABOLIC PANEL, COMPREHENSIVE   Result Value Ref Range    Sodium 137 136 - 145 mmol/L    Potassium 4.8 3.5 - 5.5 mmol/L    Chloride 100 100 - 111 mmol/L    CO2 31 21 - 32 mmol/L    Anion gap 6 3.0 - 18 mmol/L    Glucose 168 (H) 74 - 99 mg/dL    BUN 36 (H) 7.0 - 18 MG/DL    Creatinine 1.88 (H) 0.6 - 1.3 MG/DL    BUN/Creatinine ratio 19 12 - 20      GFR est AA 46 (L) >60 ml/min/1.73m2    GFR est non-AA 38 (L) >60 ml/min/1.73m2    Calcium 9.4 8.5 - 10.1 MG/DL    Bilirubin, total 0.4 0.2 - 1.0 MG/DL    ALT (SGPT) 46 16 - 61 U/L    AST (SGOT) 28 10 - 38 U/L    Alk. phosphatase 71 45 - 117 U/L    Protein, total 8.0 6.4 - 8.2 g/dL    Albumin 4.3 3.4 - 5.0 g/dL    Globulin 3.7 2.0 - 4.0 g/dL    A-G Ratio 1.2 0.8 - 1.7     HEMOGLOBIN A1C WITH EAG   Result Value Ref Range    Hemoglobin A1c 9.8 (H) 4.2 - 5.6 %    Est. average glucose 235 mg/dL   LIPID PANEL   Result Value Ref Range    LIPID PROFILE          Cholesterol, total 176 <200 MG/DL    Triglyceride 281 (H) <150 MG/DL    HDL Cholesterol 36 (L) 40 - 60 MG/DL    LDL, calculated 83.8 0 - 100 MG/DL    VLDL, calculated 56.2 MG/DL    CHOL/HDL Ratio 4.9 0 - 5.0       Last Diabetic Foot Exam on:   Diabetic Foot and Eye Exam  Status   Topic Date Due    Diabetic Foot Care  06/22/2022    Eye Exam  02/08/2023     Objective:  Visit Vitals  /69 (BP 1 Location: Left arm, BP Patient Position: Sitting, BP Cuff Size: Adult)   Pulse 93   Temp 97.8 °F (36.6 °C) (Temporal)   Resp 20   Ht 5' 10\" (1.778 m)   Wt 305 lb (138.3 kg)   SpO2 98%   BMI 43.76 kg/m²     Awake and alert in no acute distress   Neck supple without lymphadenopathy, no carotid artery bruits auscultated bilaterally. No thyromegaly   Lungs clear throughout   S1 S2 RRR without ectopy or murmur auscultated.    Extremities: no clubbing, cyanosis, peripheral edema   Abdomen - soft, nontender, nondistended, no masses or organomegaly  Integumentary: no rashes   Reviewed vital signs       Assessment/Plan:    ICD-10-CM ICD-9-CM    1. Uncontrolled type 2 diabetes mellitus with hyperglycemia (HCC)  E11.65 250.02    2. Upper abdominal pain, unspecified  R10.10 789.09 REFERRAL TO GASTROENTEROLOGY   3. Screening for colorectal cancer  Z12.11 V76.51 REFERRAL TO GASTROENTEROLOGY    Z12.12 V76.41    4. Essential hypertension  I10 401.9    5. Hypertriglyceridemia  E78.1 272.1    6. Hypovitaminosis D  E55.9 268.9      Orders Placed This Encounter    REFERRAL TO GASTROENTEROLOGY    DISCONTD: insulin glargine (LANTUS,BASAGLAR) 100 unit/mL (3 mL) inpn    DISCONTD: insulin glargine (LANTUS,BASAGLAR) 100 unit/mL (3 mL) inpn    sildenafil citrate (VIAGRA) 100 mg tablet    dulaglutide (Trulicity) 3 FF/1.7 mL pnij    insulin glargine (LANTUS,BASAGLAR) 100 unit/mL (3 mL) inpn     Increase Trulicity to 3mg daily  Advised to take Lantus 60 units daily  Issues reviewed with him: diabetic Sick Day rules reviewed, handout given and foot care discussed and Podiatry visits discussed. I have discussed the diagnosis with the patient and the intended plan as seen in the above orders. The patient has received an after-visit summary and questions were answered concerning future plans. I have discussed medication side effects and warnings with the patient as well. Patient agreeable with above plan and verbalizes understanding. Follow-up and Dispositions    · Return in about 6 weeks (around 2/2/2022) for DM, telemedicine MyChart.

## 2021-12-22 NOTE — PATIENT INSTRUCTIONS
Learning About Carbohydrate (Carb) Counting and Eating Out When You Have Diabetes  Why plan your meals? Meal planning can be a key part of managing diabetes. Planning meals and snacks with the right balance of carbohydrate, protein, and fat can help you keep your blood sugar at the target level you set with your doctor. You don't have to eat special foods. You can eat what your family eats, including sweets once in a while. But you do have to pay attention to how often you eat and how much you eat of certain foods. You may want to work with a dietitian or a certified diabetes educator. He or she can give you tips and meal ideas and can answer your questions about meal planning. This health professional can also help you reach a healthy weight if that is one of your goals. What should you know about eating carbs? Managing the amount of carbohydrate (carbs) you eat is an important part of healthy meals when you have diabetes. Carbohydrate is found in many foods. · Learn which foods have carbs. And learn the amounts of carbs in different foods. ? Bread, cereal, pasta, and rice have about 15 grams of carbs in a serving. A serving is 1 slice of bread (1 ounce), ½ cup of cooked cereal, or 1/3 cup of cooked pasta or rice. ? Fruits have 15 grams of carbs in a serving. A serving is 1 small fresh fruit, such as an apple or orange; ½ of a banana; ½ cup of cooked or canned fruit; ½ cup of fruit juice; 1 cup of melon or raspberries; or 2 tablespoons of dried fruit. ? Milk and no-sugar-added yogurt have 15 grams of carbs in a serving. A serving is 1 cup of milk or 2/3 cup of no-sugar-added yogurt. ? Starchy vegetables have 15 grams of carbs in a serving. A serving is ½ cup of mashed potatoes or sweet potato; 1 cup winter squash; ½ of a small baked potato; ½ cup of cooked beans; or ½ cup cooked corn or green peas.   · Learn how much carbs to eat each day and at each meal. A dietitian or CDE can teach you how to keep track of the amount of carbs you eat. This is called carbohydrate counting. · If you are not sure how to count carbohydrate grams, use the Plate Method to plan meals. It is a good, quick way to make sure that you have a balanced meal. It also helps you spread carbs throughout the day. ? Divide your plate by types of foods. Put non-starchy vegetables on half the plate, meat or other protein food on one-quarter of the plate, and a grain or starchy vegetable in the final quarter of the plate. To this you can add a small piece of fruit and 1 cup of milk or yogurt, depending on how many carbs you are supposed to eat at a meal.  · Try to eat about the same amount of carbs at each meal. Do not \"save up\" your daily allowance of carbs to eat at one meal.  · Proteins have very little or no carbs per serving. Examples of proteins are beef, chicken, turkey, fish, eggs, tofu, cheese, cottage cheese, and peanut butter. A serving size of meat is 3 ounces, which is about the size of a deck of cards. Examples of meat substitute serving sizes (equal to 1 ounce of meat) are 1/4 cup of cottage cheese, 1 egg, 1 tablespoon of peanut butter, and ½ cup of tofu. How can you eat out and still eat healthy? · Learn to estimate the serving sizes of foods that have carbohydrate. If you measure food at home, it will be easier to estimate the amount in a serving of restaurant food. · If the meal you order has too much carbohydrate (such as potatoes, corn, or baked beans), ask to have a low-carbohydrate food instead. Ask for a salad or green vegetables. · If you use insulin, check your blood sugar before and after eating out to help you plan how much to eat in the future. · If you eat more carbohydrate at a meal than you had planned, take a walk or do other exercise. This will help lower your blood sugar. What are some tips for eating healthy? · Limit saturated fat, such as the fat from meat and dairy products.  This is a healthy choice because people who have diabetes are at higher risk of heart disease. So choose lean cuts of meat and nonfat or low-fat dairy products. Use olive or canola oil instead of butter or shortening when cooking. · Don't skip meals. Your blood sugar may drop too low if you skip meals and take insulin or certain medicines for diabetes. · Check with your doctor before you drink alcohol. Alcohol can cause your blood sugar to drop too low. Alcohol can also cause a bad reaction if you take certain diabetes medicines. Follow-up care is a key part of your treatment and safety. Be sure to make and go to all appointments, and call your doctor if you are having problems. It's also a good idea to know your test results and keep a list of the medicines you take. Where can you learn more? Go to http://www.menchaca.com/  Enter I147 in the search box to learn more about \"Learning About Carbohydrate (Carb) Counting and Eating Out When You Have Diabetes. \"  Current as of: December 17, 2020               Content Version: 13.0  © 2006-2021 [employer]. Care instructions adapted under license by "[employer] (which disclaims liability or warranty for this information). If you have questions about a medical condition or this instruction, always ask your healthcare professional. [address] any warranty or liability for your use of this information. Counting Carbohydrates for Diabetes: Care Instructions  Your Care Instructions     You don't have to eat special foods when you have diabetes. You just have to be careful to eat healthy foods. Carbohydrates (carbs) raise blood sugar higher and quicker than any other nutrient. Carbs are found in desserts, breads and cereals, and fruit. They're also in starchy vegetables. These include potatoes, corn, and grains such as rice and pasta. Carbs are also in milk and yogurt.   The more carbs you eat at one time, the higher your blood sugar will rise. Spreading carbs all through the day helps keep your blood sugar levels within your target range. Counting carbs is one of the best ways to keep your blood sugar under control. If you use insulin, counting carbs helps you match the right amount of insulin to the number of grams of carbs in a meal. Then you can change your diet and insulin dose as needed. Testing your blood sugar several times a day can help you learn how carbs affect your blood sugar. A registered dietitian or certified diabetes educator can help you plan meals and snacks. Follow-up care is a key part of your treatment and safety. Be sure to make and go to all appointments, and call your doctor if you are having problems. It's also a good idea to know your test results and keep a list of the medicines you take. How can you care for yourself at home? Know your daily amount of carbohydrates  Your daily amount depends on several things, such as your weight, how active you are, which diabetes medicines you take, and what your goals are for your blood sugar levels. A registered dietitian or certified diabetes educator can help you plan how many carbs to include in each meal and snack. For most adults, a guideline for the daily amount of carbs is:  · 45 to 60 grams at each meal. That's about the same as 3 to 4 carbohydrate servings. · 15 to 20 grams at each snack. That's about the same as 1 carbohydrate serving. Count carbs  Counting carbs lets you know how much rapid-acting insulin to take before you eat. If you use an insulin pump, you get a constant rate of insulin during the day. So the pump must be programmed at meals. This gives you extra insulin to cover the rise in blood sugar after meals. If you take insulin:  · Learn your own insulin-to-carb ratio. You and your diabetes health professional will figure out the ratio. You can do this by testing your blood sugar after meals.  For example, you may need a certain amount of insulin for every 15 grams of carbs. · Add up the carb grams in a meal. Then you can figure out how many units of insulin to take based on your insulin-to-carb ratio. · Exercise lowers blood sugar. You can use less insulin than you would if you were not doing exercise. Keep in mind that timing matters. If you exercise within 1 hour after a meal, your body may need less insulin for that meal than it would if you exercised 3 hours after the meal. Test your blood sugar to find out how exercise affects your need for insulin. If you do or don't take insulin:  · Look at labels on packaged foods. This can tell you how many carbs are in a serving. You can also use guides from the American Diabetes Association. · Be aware of portions, or serving sizes. If a package has two servings and you eat the whole package, you need to double the number of grams of carbohydrate listed for one serving. · Protein, fat, and fiber do not raise blood sugar as much as carbs do. If you eat a lot of these nutrients in a meal, your blood sugar will rise more slowly than it would otherwise. Eat from all food groups  · Eat at least three meals a day. · Plan meals to include food from all the food groups. The food groups include grains, fruits, dairy, proteins, and vegetables. · Talk to your dietitian or diabetes educator about ways to add limited amounts of sweets into your meal plan. · If you drink alcohol, talk to your doctor. It may not be recommended when you are taking certain diabetes medicines. Where can you learn more? Go to http://www.gray.com/  Enter G703 in the search box to learn more about \"Counting Carbohydrates for Diabetes: Care Instructions. \"  Current as of: August 31, 2020               Content Version: 13.0  © 8547-6605 [employer]. Care instructions adapted under license by [employer] (which disclaims liability or warranty for this information).  If you have questions about a medical condition or this instruction, always ask your healthcare professional. [place] [address] any warranty or liability for your use of this information.

## 2021-12-27 NOTE — TELEPHONE ENCOUNTER
Regarding Lantus. The medication is not covered by the insurance. Preferred alternatives: Yandel Mcmanus.

## 2022-01-27 ENCOUNTER — TRANSCRIBE ORDER (OUTPATIENT)
Dept: SCHEDULING | Age: 52
End: 2022-01-27

## 2022-01-27 DIAGNOSIS — E11.52 DIABETIC GANGRENE (HCC): Primary | ICD-10-CM

## 2022-02-02 ENCOUNTER — HOSPITAL ENCOUNTER (OUTPATIENT)
Dept: LAB | Age: 52
Discharge: HOME | End: 2022-02-02

## 2022-02-02 ENCOUNTER — HOSPITAL ENCOUNTER (OUTPATIENT)
Dept: VASCULAR SURGERY | Age: 52
Discharge: HOME | End: 2022-02-02
Attending: PODIATRIST
Payer: COMMERCIAL

## 2022-02-02 DIAGNOSIS — E11.52 DIABETIC GANGRENE (HCC): ICD-10-CM

## 2022-02-02 LAB — XX-LABCORP SPECIMEN COL,LCBCF: NORMAL

## 2022-02-02 PROCEDURE — 99001 SPECIMEN HANDLING PT-LAB: CPT

## 2022-02-02 PROCEDURE — 93922 UPR/L XTREMITY ART 2 LEVELS: CPT

## 2022-02-02 PROCEDURE — 93923 UPR/LXTR ART STDY 3+ LVLS: CPT

## 2022-02-03 ENCOUNTER — VIRTUAL VISIT (OUTPATIENT)
Dept: FAMILY MEDICINE CLINIC | Age: 52
End: 2022-02-03
Payer: COMMERCIAL

## 2022-02-03 DIAGNOSIS — E11.65 TYPE 2 DIABETES MELLITUS WITH HYPERGLYCEMIA, UNSPECIFIED WHETHER LONG TERM INSULIN USE (HCC): Primary | ICD-10-CM

## 2022-02-03 PROCEDURE — 99212 OFFICE O/P EST SF 10 MIN: CPT | Performed by: NURSE PRACTITIONER

## 2022-02-03 RX ORDER — MEMANTINE HYDROCHLORIDE 5 MG/1
5 TABLET ORAL 2 TIMES DAILY
COMMUNITY
Start: 2022-01-17 | End: 2022-06-21

## 2022-02-03 NOTE — PROGRESS NOTES
Pedro Triplett is a 47 y.o. male who was seen by synchronous (real-time) audio-video technology on 2/3/2022 for Diabetes    Assessment & Plan:     Diagnoses and all orders for this visit:    1. Type 2 diabetes mellitus with hyperglycemia, unspecified whether long term insulin use (HCC)      Reinforced importance of checking glucose as advised. Congratulated on decreasing portion sizes. Encouraged to adhere to ADA diet as previously advised. Follow-up and Dispositions    · Return in about 3 months (around 5/3/2022) for DM/HTN/HLD, in office follow up, fasting labs 1 wk prior. Routing History       I spent at least 22 minutes on this visit with this established patient. [id number]  Subjective:   Prediabetic Review of Systems - medication compliance: compliant all of the time, diet compliance: patient states his portions are smaller, home glucose monitoring: is performed sporadically, further ROS: no polyuria or polydipsia, no chest pain, dyspnea or TIA's, no numbness, tingling or pain in extremities. Patient states he has not had a amanda sensor on in 3 weeks, reports the last time his checked his glucose it was 150-180. Patient states he was started on namenda by neurologist Dr. Cinda Colón. States he was sent to another provider for 'memory' testing. Prior to Admission medications    Medication Sig Start Date End Date Taking? Authorizing Provider   Jardiance 25 mg tablet TAKE 1 TABLET BY MOUTH EVERY DAY 12/22/21   Brittany PENDLETON NP   sildenafil citrate (VIAGRA) 100 mg tablet Take 1 Tablet by mouth as needed (45 mins prior to intercourse). 12/22/21   Brittany PENDLETON NP   dulaglutide (Trulicity) 3 UV/2.6 mL pnij 3 mg by SubCUTAneous route every seven (7) days. 12/22/21   Brittany PENDLETON NP   insulin glargine (LANTUS,BASAGLAR) 100 unit/mL (3 mL) inpn 60 Units by SubCUTAneous route daily for 90 days.  12/22/21 3/22/22  Brittany PENDLETON NP   ergocalciferol (ERGOCALCIFEROL) 1,250 mcg (50,000 unit) capsule TAKE 1 CAPSULE BY MOUTH EVERY SEVEN (7) DAYS. 10/4/21   Louise Khan MANDO PENDLETON   rosuvastatin (CRESTOR) 20 mg tablet TAKE 1 TABLET BY MOUTH EVERY DAY AT NIGHT 10/4/21   Louise Khan MANDO PENDLETON   cyclobenzaprine (FLEXERIL) 10 mg tablet 1 tid prn back pain  Patient not taking: Reported on 12/22/2021 9/22/21   Louise Khan MANDO PENDLETON   tamsulosin (Flomax) 0.4 mg capsule Take 1 Capsule by mouth daily. 9/22/21   Louise Miss MANDO PENDLETON   flash glucose sensor (FreeStyle Jarad 14 Day Sensor) kit Apply new sensor every 14 days to monitor blood sugar as directed. Please dispense 2 sensors. 6/22/21   Louise Khan MANDO PENDLETON   lisinopril-hydroCHLOROthiazide (PRINZIDE, ZESTORETIC) 20-25 mg per tablet Take 1 Tablet by mouth daily. 6/22/21   Louise PENDLETON NP   Insulin Needles, Disposable, (BD Ultra-Fine Short Pen Needle) 31 gauge x 5/16\" ndle Inject 80 units by subcutaneous route every night. 6/22/20   Louise PENDLETON NP   Blood Pressure Kit-Extra Large kit Take blood pressure as directed 5/11/20   Louise PENDLETON NP   Blood-Glucose Transmitter (Dexcom G6 Transmitter) eliseo Use with Dexcom CGM to check blood sugar 4 or more times daily. 5/11/20   Louise Miss PENDLETONMANDO   Blood-Glucose Sensor (Dexcom G6 Sensor) eliseo Use to check blood sugar 4 or more times daily. 5/11/20   Louise PENDLETON NP   Blood-Glucose Meter,[facility] ) misc Use to check blood sugar 4 or more times daily. 2/11/20   Louise  MADNO PENDLETON   flash glucose scanning reader (FREESTYLE JARAD 14 DAY READER) misc Use to monitor blood sugar at least 3 times daily 10/29/19   Louise PENDLETON NP   multivitamin (ONE A DAY) tablet Take 1 Tab by mouth daily.     Provider, Historical     Patient Active Problem List   Diagnosis Code    Obesity, morbid ([facility] [address] E66.01     Patient Active Problem List    Diagnosis Date Noted    Obesity, morbid ([facility] [address] 08/14/2018     Allergies   Allergen Reactions    Pcn [Penicillins] Other (comments)     Unknown was told when a child     Past Medical History: Diagnosis Date    Diabetes ([facility] [address]     Hypercholesterolemia     Hypertension      Past Surgical History:   Procedure Laterality Date    HX APPENDECTOMY       Family History   Problem Relation Age of Onset    Cancer Mother         unknown ? breast    Heart Disease Father     No Known Problems Sister     Cancer Brother     No Known Problems Sister     No Known Problems Brother     No Known Problems Brother     Diabetes Daughter     Diabetes Daughter      Social History     Tobacco Use    Smoking status: Never Smoker    Smokeless tobacco: Never Used   Substance Use Topics    Alcohol use: Yes     Comment: Occassional use       ROS    Objective:   No flowsheet data found. General: alert, cooperative, no distress   Mental  status: normal mood, behavior, speech, dress, motor activity, and thought processes, able to follow commands   HENT: NCAT   Neck: no visualized mass   Resp: no respiratory distress   Neuro: no gross deficits   Skin: no discoloration or lesions of concern on visible areas   Psychiatric: normal affect, consistent with stated mood, no evidence of hallucinations     Additional exam findings: We discussed the expected course, resolution and complications of the diagnosis(es) in detail. Medication risks, benefits, costs, interactions, and alternatives were discussed as indicated. I advised him to contact the office if his condition worsens, changes or fails to improve as anticipated. He expressed understanding with the diagnosis(es) and plan. Barb Chen., was evaluated through a synchronous (real-time) audio-video encounter. The patient (or guardian if applicable) is aware that this is a billable service, which includes applicable co-pays. Verbal consent to proceed has been obtained.  The visit was conducted pursuant to the emergency declaration under the [facility]5 waiver authority and the [employer] Supplemental Appropriations Act. Patient identification was verified, and a caregiver was present when appropriate. The patient was located at home in a state where the provider was licensed to provide care.     Chris Manuel NP

## 2022-02-06 LAB
LEFT ABI: 0.93
LEFT ARM BP: 109 MMHG
LEFT POSTERIOR TIBIAL: 0 MMHG
LEFT TBI: 0.93
LEFT TOE PRESSURE: 101 MMHG
RIGHT ABI: 1.1
RIGHT ARM BP: 106 MMHG
RIGHT POSTERIOR TIBIAL: 120 MMHG
RIGHT TBI: 0.99
RIGHT TOE PRESSURE: 108 MMHG
VAS LEFT DORSALIS PEDIS BP: 101 MMHG
VAS RIGHT DORSALIS PEDIS BP: 112 MMHG

## 2022-02-25 RX ORDER — LISINOPRIL AND HYDROCHLOROTHIAZIDE 20; 25 MG/1; MG/1
TABLET ORAL
Qty: 90 TABLET | Refills: 2 | Status: SHIPPED | OUTPATIENT
Start: 2022-02-25 | End: 2022-05-04 | Stop reason: SDUPTHER

## 2022-03-19 PROBLEM — E66.01 OBESITY, MORBID (HCC): Status: ACTIVE | Noted: 2018-08-14

## 2022-05-04 ENCOUNTER — HOSPITAL ENCOUNTER (OUTPATIENT)
Dept: LAB | Age: 52
Discharge: HOME | End: 2022-05-04
Payer: COMMERCIAL

## 2022-05-04 ENCOUNTER — OFFICE VISIT (OUTPATIENT)
Dept: FAMILY MEDICINE CLINIC | Age: 52
End: 2022-05-04
Payer: COMMERCIAL

## 2022-05-04 VITALS
OXYGEN SATURATION: 98 % | SYSTOLIC BLOOD PRESSURE: 99 MMHG | TEMPERATURE: 97.1 F | DIASTOLIC BLOOD PRESSURE: 66 MMHG | HEIGHT: 70 IN | RESPIRATION RATE: 18 BRPM | HEART RATE: 83 BPM | WEIGHT: 301 LBS | BODY MASS INDEX: 43.09 KG/M2

## 2022-05-04 DIAGNOSIS — E78.1 HYPERTRIGLYCERIDEMIA: ICD-10-CM

## 2022-05-04 DIAGNOSIS — E66.01 OBESITY, CLASS III, BMI 40-49.9 (MORBID OBESITY) (HCC): ICD-10-CM

## 2022-05-04 DIAGNOSIS — E55.9 HYPOVITAMINOSIS D: ICD-10-CM

## 2022-05-04 DIAGNOSIS — E11.65 TYPE 2 DIABETES MELLITUS WITH HYPERGLYCEMIA, UNSPECIFIED WHETHER LONG TERM INSULIN USE (HCC): ICD-10-CM

## 2022-05-04 DIAGNOSIS — I10 ESSENTIAL HYPERTENSION: ICD-10-CM

## 2022-05-04 DIAGNOSIS — E11.65 TYPE 2 DIABETES MELLITUS WITH HYPERGLYCEMIA, UNSPECIFIED WHETHER LONG TERM INSULIN USE (HCC): Primary | ICD-10-CM

## 2022-05-04 LAB
25(OH)D3 SERPL-MCNC: 40.8 NG/ML (ref 30–100)
ALBUMIN SERPL-MCNC: 4.2 G/DL (ref 3.4–5)
ALBUMIN/GLOB SERPL: 1.1 {RATIO} (ref 0.8–1.7)
ALP SERPL-CCNC: 60 U/L (ref 45–117)
ALT SERPL-CCNC: 45 U/L (ref 16–61)
ANION GAP SERPL CALC-SCNC: 6 MMOL/L (ref 3–18)
AST SERPL-CCNC: 32 U/L (ref 10–38)
BILIRUB SERPL-MCNC: 0.4 MG/DL (ref 0.2–1)
BUN SERPL-MCNC: 37 MG/DL (ref 7–18)
BUN/CREAT SERPL: 20 (ref 12–20)
CALCIUM SERPL-MCNC: 9.6 MG/DL (ref 8.5–10.1)
CHLORIDE SERPL-SCNC: 98 MMOL/L (ref 100–111)
CHOLEST SERPL-MCNC: 158 MG/DL
CO2 SERPL-SCNC: 30 MMOL/L (ref 21–32)
CREAT SERPL-MCNC: 1.89 MG/DL (ref 0.6–1.3)
EST. AVERAGE GLUCOSE BLD GHB EST-MCNC: 212 MG/DL
GLOBULIN SER CALC-MCNC: 3.7 G/DL (ref 2–4)
GLUCOSE SERPL-MCNC: 169 MG/DL (ref 74–99)
HBA1C MFR BLD: 9 % (ref 4.2–5.6)
HDLC SERPL-MCNC: 32 MG/DL (ref 40–60)
HDLC SERPL: 4.9 {RATIO} (ref 0–5)
LDLC SERPL CALC-MCNC: 51.8 MG/DL (ref 0–100)
LIPID PROFILE,FLP: ABNORMAL
POTASSIUM SERPL-SCNC: 4.4 MMOL/L (ref 3.5–5.5)
PROT SERPL-MCNC: 7.9 G/DL (ref 6.4–8.2)
SODIUM SERPL-SCNC: 134 MMOL/L (ref 136–145)
TRIGL SERPL-MCNC: 371 MG/DL (ref ?–150)
VLDLC SERPL CALC-MCNC: 74.2 MG/DL

## 2022-05-04 PROCEDURE — 80061 LIPID PANEL: CPT

## 2022-05-04 PROCEDURE — 83036 HEMOGLOBIN GLYCOSYLATED A1C: CPT

## 2022-05-04 PROCEDURE — 36415 COLL VENOUS BLD VENIPUNCTURE: CPT

## 2022-05-04 PROCEDURE — 80053 COMPREHEN METABOLIC PANEL: CPT

## 2022-05-04 PROCEDURE — 82306 VITAMIN D 25 HYDROXY: CPT

## 2022-05-04 PROCEDURE — 99214 OFFICE O/P EST MOD 30 MIN: CPT | Performed by: NURSE PRACTITIONER

## 2022-05-04 RX ORDER — LISINOPRIL AND HYDROCHLOROTHIAZIDE 20; 25 MG/1; MG/1
1 TABLET ORAL DAILY
Qty: 90 TABLET | Refills: 2 | Status: SHIPPED | OUTPATIENT
Start: 2022-05-04

## 2022-05-04 RX ORDER — DULAGLUTIDE 3 MG/.5ML
3 INJECTION, SOLUTION SUBCUTANEOUS
Qty: 12 EACH | Refills: 3 | Status: SHIPPED | OUTPATIENT
Start: 2022-05-04

## 2022-05-04 RX ORDER — SILDENAFIL 100 MG/1
100 TABLET, FILM COATED ORAL AS NEEDED
Qty: 10 TABLET | Refills: 1 | Status: SHIPPED | OUTPATIENT
Start: 2022-05-04

## 2022-05-04 RX ORDER — INSULIN GLARGINE 100 [IU]/ML
65 INJECTION, SOLUTION SUBCUTANEOUS
COMMUNITY
Start: 2022-03-31 | End: 2022-09-22

## 2022-05-04 NOTE — PROGRESS NOTES
1. \"Have you been to the ER, urgent care clinic since your last visit? Hospitalized since your last visit? \" No    2. \"Have you seen or consulted any other health care providers outside of the [address] [place] [address] since your last visit? \" No     3. For patients aged 39-70: Has the patient had a colonoscopy / FIT/ Cologuard?  No 36.9

## 2022-05-04 NOTE — PATIENT INSTRUCTIONS
Diabetes Foot Health: Care Instructions  Your Care Instructions     When you have diabetes, your feet need extra care and attention. Diabetes can damage the nerve endings and blood vessels in your feet, making you less likely to notice when your feet are injured. Diabetes also limits your body's ability to fight infection and get blood to areas that need it. If you get a minor foot injury, it could become an ulcer or a serious infection. With good foot care, you can prevent most of these problems. Caring for your feet can be quick and easy. Most of the care can be done when you are bathing or getting ready for bed. Follow-up care is a key part of your treatment and safety. Be sure to make and go to all appointments, and call your doctor if you are having problems. It's also a good idea to know your test results and keep a list of the medicines you take. How can you care for yourself at home? · Keep your blood sugar close to normal by watching what and how much you eat, monitoring blood sugar, taking medicines if prescribed, and getting regular exercise. · Do not smoke. Smoking affects blood flow and can make foot problems worse. If you need help quitting, talk to your doctor about stop-smoking programs and medicines. These can increase your chances of quitting for good. · Eat a diet that is low in fats. High fat intake can cause fat to build up in your blood vessels and decrease blood flow. · Inspect your feet daily for blisters, cuts, cracks, or sores. If you cannot see well, use a mirror or have someone help you. · Take care of your feet:  ? Wash your feet every day. Use warm (not hot) water. Check the water temperature with your wrists or other part of your body, not your feet. ? Dry your feet well. Pat them dry. Do not rub the skin on your feet too hard. Dry well between your toes. If the skin on your feet stays moist, bacteria or a fungus can grow, which can lead to infection. ?  Keep your skin soft. Use moisturizing skin cream to keep the skin on your feet soft and prevent calluses and cracks. But do not put the cream between your toes, and stop using any cream that causes a rash. ? Clean underneath your toenails carefully. Do not use a sharp object to clean underneath your toenails. Use the blunt end of a nail file or other rounded tool. ? Trim and file your toenails straight across to prevent ingrown toenails. Use a nail clipper, not scissors. Use an emery board to smooth the edges. · Change socks daily. Socks without seams are best, because seams often rub the feet. You can find socks for people with diabetes from specialty catalogs. · Look inside your shoes every day for things like gravel or torn linings, which could cause blisters or sores. · Buy shoes that fit well:  ? Look for shoes that have plenty of space around the toes. This helps prevent bunions and blisters. ? Try on shoes while wearing the kind of socks you will usually wear with the shoes. ? Avoid plastic shoes. They may rub your feet and cause blisters. Good shoes should be made of materials that are flexible and breathable, such as leather or cloth. ? Break in new shoes slowly by wearing them for no more than an hour a day for several days. Take extra time to check your feet for red areas, blisters, or other problems after you wear new shoes. · Do not go barefoot. Do not wear sandals, and do not wear shoes with very thin soles. Thin soles are easy to puncture. They also do not protect your feet from hot pavement or cold weather. · Have your doctor check your feet during each visit. If you have a foot problem, see your doctor. Do not try to treat an early foot problem at home. Home remedies or treatments that you can buy without a prescription (such as corn removers) can be harmful. · Always get early treatment for foot problems. A minor irritation can lead to a major problem if not properly cared for early.   When should you call for help? Call your doctor now or seek immediate medical care if:    · You have a foot sore, an ulcer or break in the skin that is not healing after 4 days, bleeding corns or calluses, or an ingrown toenail.     · You have blue or black areas, which can mean bruising or blood flow problems.     · You have peeling skin or tiny blisters between your toes or cracking or oozing of the skin.     · You have a fever for more than 24 hours and a foot sore.     · You have new numbness or tingling in your feet that does not go away after you move your feet or change positions.     · You have unexplained or unusual swelling of the foot or ankle. Watch closely for changes in your health, and be sure to contact your doctor if:    · You cannot do proper foot care. Where can you learn more? Go to http://www.gray.com/  Enter A739 in the search box to learn more about \"Diabetes Foot Health: Care Instructions. \"  Current as of: July 28, 2021               Content Version: 13.2  © 2006-2022 [employer]. Care instructions adapted under license by [employer] (which disclaims liability or warranty for this information). If you have questions about a medical condition or this instruction, always ask your healthcare professional. [address] any warranty or liability for your use of this information.

## 2022-05-04 NOTE — PROGRESS NOTES
Subjective:    Foreign Ross is a 47y.o. year old male seen for follow up of diabetes. He also has hypertension and hyperlipidemia. Diabetic Review of Systems - medication compliance: compliant all of the time, taking Basaglar 60 units daily, diabetic diet compliance: states he does continue to eat smaller portions, home glucose monitoring: is performed regularly, 100-200 wearing amanda, further diabetic ROS: no polyuria or polydipsia, no chest pain, dyspnea or TIA's, no numbness, tingling or pain in extremities, last eye exam approximately Jan/Feb 2022. Other symptoms and concerns: patient states he has difficulty loosing weight. Inquiring if weight loss surgery would be a good idea for him. Patient states he started having upper abd cramping only with sneezing when his 'stomach tightens'. Reports today during exam when going from laying to sitting position he felt his stomach fluttering, denies actual cramping at this time. 2/3/2022 virtual visit  Subjective:   Prediabetic Review of Systems - medication compliance: compliant all of the time, diet compliance: patient states his portions are smaller, home glucose monitoring: is performed sporadically, further ROS: no polyuria or polydipsia, no chest pain, dyspnea or TIA's, no numbness, tingling or pain in extremities. Patient states he has not had a amanda sensor on in 3 weeks, reports the last time his checked his glucose it was 150-180. Patient states he was started on namenda by neurologist Dr. Caitlyn Causey. States he was sent to another provider for 'memory' testing. Assessment & Plan:   Diagnoses and all orders for this visit:     1. Type 2 diabetes mellitus with hyperglycemia, unspecified whether long term insulin use (HCC)     Reinforced importance of checking glucose as advised. Congratulated on decreasing portion sizes.   Encouraged to adhere to ADA diet as previously advised.     Follow-up and Dispositions    · Return in about 3 months (around 5/3/2022) for DM/HTN/HLD, in office follow up, fasting labs 1 wk prior. 12/22/2021   Subjective:  Joselin Calderon is a 49y.o. year old male seen for follow up of diabetes. He also has hypertension and hyperlipidemia. Diabetic Review of Systems - medication compliance: compliant all of the time, states he has been taking 60-70 units, has been taking this range for awhile for the last several months, diabetic diet compliance: noncompliant some of the time, home glucose monitoring: is performed regularly, nonfasting values range 110-160/180, 200 after eating, further diabetic ROS: no polyuria or polydipsia, no chest pain, dyspnea or TIA's, no numbness, tingling or pain in extremities. Other symptoms and concerns: patient states since he began taking his medication after getting off of work in the morning between 7830-8315 and has noticed urinary urgency has improved. 9/22/2021  Diabetic Review of Systems - medication compliance: compliant most of the time, diabetic diet compliance: noncompliant some of the time, home glucose monitoring: is performed sporadically, fasting values range mid 100s, further diabetic ROS: no polyuria or polydipsia, no chest pain, dyspnea or TIA's, no numbness, tingling or pain in extremities, last eye exam 2/8/2021. Patient states he has been eating more sweets as of late. Other symptoms and concerns: patients he has been having urinary at night. Reports has not been taking flomax as prescribed. Subjective:  Joselin Calderon is a 49y.o. year old male seen for follow up of diabetes. He also has hypertension and hyperlipidemia.   Diabetic Review of Systems - medication compliance: compliant all of the time, states he has been taking 60-70 units, has been taking this range for awhile for the last several months, diabetic diet compliance: noncompliant some of the time, home glucose monitoring: is performed regularly, nonfasting values range 110-160/180, 200 after eating, further diabetic ROS: no polyuria or polydipsia, no chest pain, dyspnea or TIA's, no numbness, tingling or pain in extremities. Other symptoms and concerns: patient states since he began taking his medication after getting off of work in the morning between 1329-1777 and has noticed urinary urgency has improved. 9/22/2021  Diabetic Review of Systems - medication compliance: compliant most of the time, diabetic diet compliance: noncompliant some of the time, home glucose monitoring: is performed sporadically, fasting values range mid 100s, further diabetic ROS: no polyuria or polydipsia, no chest pain, dyspnea or TIA's, no numbness, tingling or pain in extremities, last eye exam 2/8/2021. Patient states he has been eating more sweets as of late. Other symptoms and concerns: patients he has been having urinary at night. Reports has not been taking flomax as prescribed. Patient Active Problem List   Diagnosis Code    Obesity, morbid ([facility]) E66.01     Current Outpatient Medications   Medication Sig Dispense Refill    Basaglar KwikPen U-100 Insulin 100 unit/mL (3 mL) inpn INJECT 75 UNITS BY SUBCUTANEOUS ROUTE NIGHTLY FOR 90 DAYS.  ergocalciferol (ERGOCALCIFEROL) 1,250 mcg (50,000 unit) capsule TAKE 1 CAPSULE BY MOUTH EVERY SEVEN (7) DAYS. 12 Capsule 1    rosuvastatin (CRESTOR) 20 mg tablet TAKE 1 TABLET BY MOUTH EVERY DAY AT NIGHT 90 Tablet 1    lisinopril-hydroCHLOROthiazide (PRINZIDE, ZESTORETIC) 20-25 mg per tablet TAKE 1 TABLET BY MOUTH EVERY DAY 90 Tablet 2    memantine (NAMENDA) 5 mg tablet Take 5 mg by mouth two (2) times a day.  Jardiance 25 mg tablet TAKE 1 TABLET BY MOUTH EVERY DAY 90 Tablet 1    sildenafil citrate (VIAGRA) 100 mg tablet Take 1 Tablet by mouth as needed (45 mins prior to intercourse). 10 Tablet 1    dulaglutide (Trulicity) 3 XL/5.2 mL pnij 3 mg by SubCUTAneous route every seven (7) days.  12 Each 1    flash glucose sensor (FreeStyle Jarad 14 Day Sensor) kit Apply new sensor every 14 days to monitor blood sugar as directed. Please dispense 2 sensors. 2 Kit 11    Insulin Needles, Disposable, (BD Ultra-Fine Short Pen Needle) 31 gauge x 5/16\" ndle Inject 80 units by subcutaneous route every night. 1 Package 11    flash glucose scanning reader (SimpleTherapySTYLE GERALD 14 DAY READER) [facility] Use to monitor blood sugar at least 3 times daily 1 Each 0    multivitamin (ONE A DAY) tablet Take 1 Tab by mouth daily.  tamsulosin (Flomax) 0.4 mg capsule Take 1 Capsule by mouth daily. (Patient not taking: Reported on 2/3/2022) 900 Capsule 1    Blood Pressure Kit-Extra Large kit Take blood pressure as directed (Patient not taking: Reported on 5/4/2022) 1 Each 0      Allergies   Allergen Reactions    Pcn [Penicillins] Other (comments)     Unknown was told when a child     Past Surgical History:   Procedure Laterality Date    HX APPENDECTOMY       Family History   Problem Relation Age of Onset    Cancer Mother         unknown ?  breast    Heart Disease Father     No Known Problems Sister     Cancer Brother     No Known Problems Sister     No Known Problems Brother     No Known Problems Brother     Diabetes Daughter     Diabetes Daughter       Lab Results   Component Value Date/Time    Cholesterol, total 176 12/15/2021 08:15 AM    HDL Cholesterol 36 (L) 12/15/2021 08:15 AM    LDL, calculated 83.8 12/15/2021 08:15 AM    VLDL, calculated 56.2 12/15/2021 08:15 AM    Triglyceride 281 (H) 12/15/2021 08:15 AM    CHOL/HDL Ratio 4.9 12/15/2021 08:15 AM     Lab Results   Component Value Date/Time    Sodium 137 12/15/2021 08:15 AM    Potassium 4.8 12/15/2021 08:15 AM    Chloride 100 12/15/2021 08:15 AM    CO2 31 12/15/2021 08:15 AM    Anion gap 6 12/15/2021 08:15 AM    Glucose 168 (H) 12/15/2021 08:15 AM    BUN 36 (H) 12/15/2021 08:15 AM    Creatinine 1.88 (H) 12/15/2021 08:15 AM    BUN/Creatinine ratio 19 12/15/2021 08:15 AM    GFR est AA 46 (L) 12/15/2021 08:15 AM    GFR est non-AA 38 (L) 12/15/2021 08:15 AM    Calcium 9.4 12/15/2021 08:15 AM    Bilirubin, total 0.4 12/15/2021 08:15 AM    Alk. phosphatase 71 12/15/2021 08:15 AM    Protein, total 8.0 12/15/2021 08:15 AM    Albumin 4.3 12/15/2021 08:15 AM    Globulin 3.7 12/15/2021 08:15 AM    A-G Ratio 1.2 12/15/2021 08:15 AM    ALT (SGPT) 46 12/15/2021 08:15 AM    AST (SGOT) 28 12/15/2021 08:15 AM     Lab Results   Component Value Date/Time    WBC 5.4 11/06/2021 03:35 AM    HGB 14.4 11/06/2021 03:35 AM    HCT 44.6 11/06/2021 03:35 AM    PLATELET 317 42/88/4505 03:35 AM    MCV 84.5 11/06/2021 03:35 AM     Lab Results   Component Value Date/Time    Hemoglobin A1c 9.8 (H) 12/15/2021 08:15 AM    Hemoglobin A1c (POC) 8.9 03/10/2020 07:30 AM     Lab Results   Component Value Date/Time    Microalbumin/Creat ratio (mg/g creat) 406 (H) 06/22/2021 02:20 PM    Microalbumin,urine random 33.30 (H) 06/22/2021 02:20 PM     Wt Readings from Last 3 Encounters:   12/22/21 305 lb (138.3 kg)   11/06/21 310 lb (140.6 kg)   09/22/21 299 lb (135.6 kg)     Last Point of Care HGB A1C  Hemoglobin A1c (POC)   Date Value Ref Range Status   03/10/2020 8.9 % Final      BP Readings from Last 3 Encounters:   12/22/21 120/69   11/06/21 131/71   09/22/21 112/68     Results for orders placed or performed during the hospital encounter of 02/02/22   [facility] SPECIMEN COL   Result Value Ref Range    [facility] SPECIM[facility]LL[facility]. Specimens collected/sent to [facility]. Please direct inquiries to [phone]).      Last Diabetic Foot Exam on:   Diabetic Foot and Eye Exam HM Status   Topic Date Due    Diabetic Foot Care  06/22/2022    Eye Exam  02/21/2024     Objective:  Visit Vitals  BP 99/66 (BP 1 Location: Right upper arm, BP Patient Position: Sitting, BP Cuff Size: Large adult)   Pulse 83   Temp 97.1 °F (36.2 °C) (Temporal)   Resp 18   Ht 5' 10\" (1.778 m)   Wt 301 lb (136.5 kg)   SpO2 98%   BMI 43.19 kg/m²     Awake and alert in no acute distress   Neck supple without lymphadenopathy, no carotid artery bruits auscultated bilaterally. No thyromegaly   Lungs clear throughout   S1 S2 RRR without ectopy or murmur auscultated. Extremities: no clubbing, cyanosis, peripheral edema   Abdomen - soft, nontender, nondistended, no masses or organomegaly  Integumentary: no rashes   Reviewed vital signs       Assessment/Plan:    ICD-10-CM ICD-9-CM    1. Type 2 diabetes mellitus with hyperglycemia, unspecified whether long term insulin use (HCC)  E11.65 250.00 HEMOGLOBIN A1C WITH EAG      LIPID PANEL      METABOLIC PANEL, COMPREHENSIVE   2. Essential hypertension  I10 401.9 LIPID PANEL      METABOLIC PANEL, COMPREHENSIVE   3. Hypertriglyceridemia  E78.1 272.1    4. Hypovitaminosis D  E55.9 268.9 VITAMIN D, 25 HYDROXY     Orders Placed This Encounter    HEMOGLOBIN A1C WITH EAG    LIPID PANEL    METABOLIC PANEL, COMPREHENSIVE    VITAMIN D, 25 HYDROXY    EMPL [place] [facility] Ref [id number]    Basaglar KwikPen U-100 Insulin 100 unit/mL (3 mL) inpn    lisinopril-hydroCHLOROthiazide (PRINZIDE, ZESTORETIC) 20-25 mg per tablet    empagliflozin (Jardiance) 25 mg tablet    dulaglutide (Trulicity) 3 FE/8.5 mL pnij    sildenafil citrate (VIAGRA) 100 mg tablet      requested medication refills sent to pharmacy. Issues reviewed with him: low cholesterol diet, weight control and daily exercise discussed and home glucose monitoring emphasized. I have discussed the diagnosis with the patient and the intended plan as seen in the above orders. The patient has received an after-visit summary and questions were answered concerning future plans. I have discussed medication side effects and warnings with the patient as well. Patient agreeable with above plan and verbalizes understanding. Follow-up and Dispositions    · Return in about 4 months (around 9/4/2022) for DM/HTN/HLD, fasting labs 1 wk prior, in office follow up.

## 2022-06-21 ENCOUNTER — APPOINTMENT (OUTPATIENT)
Age: 52
End: 2022-06-21
Attending: PHYSICIAN ASSISTANT
Payer: COMMERCIAL

## 2022-06-21 ENCOUNTER — APPOINTMENT (OUTPATIENT)
Dept: CT IMAGING | Age: 52
End: 2022-06-21
Attending: PHYSICIAN ASSISTANT
Payer: COMMERCIAL

## 2022-06-21 ENCOUNTER — HOSPITAL ENCOUNTER (EMERGENCY)
Age: 52
Discharge: HOME | End: 2022-06-21
Attending: EMERGENCY MEDICINE
Payer: COMMERCIAL

## 2022-06-21 VITALS
BODY MASS INDEX: 42.37 KG/M2 | DIASTOLIC BLOOD PRESSURE: 72 MMHG | TEMPERATURE: 98.8 F | RESPIRATION RATE: 20 BRPM | OXYGEN SATURATION: 100 % | HEIGHT: 70 IN | SYSTOLIC BLOOD PRESSURE: 124 MMHG | HEART RATE: 74 BPM | WEIGHT: 296 LBS

## 2022-06-21 DIAGNOSIS — M54.9 BACK PAIN, UNSPECIFIED BACK LOCATION, UNSPECIFIED BACK PAIN LATERALITY, UNSPECIFIED CHRONICITY: ICD-10-CM

## 2022-06-21 DIAGNOSIS — R51.9 NONINTRACTABLE HEADACHE, UNSPECIFIED CHRONICITY PATTERN, UNSPECIFIED HEADACHE TYPE: Primary | ICD-10-CM

## 2022-06-21 LAB — SARS-COV-2, COV2: NORMAL

## 2022-06-21 PROCEDURE — 96374 THER/PROPH/DIAG INJ IV PUSH: CPT

## 2022-06-21 PROCEDURE — U0003 INFECTIOUS AGENT DETECTION BY NUCLEIC ACID (DNA OR RNA); SEVERE ACUTE RESPIRATORY SYNDROME CORONAVIRUS 2 (SARS-COV-2) (CORONAVIRUS DISEASE [COVID-19]), AMPLIFIED PROBE TECHNIQUE, MAKING USE OF HIGH THROUGHPUT TECHNOLOGIES AS DESCRIBED BY CMS-2020-01-R: HCPCS

## 2022-06-21 PROCEDURE — 70551 MRI BRAIN STEM W/O DYE: CPT

## 2022-06-21 PROCEDURE — 96375 TX/PRO/DX INJ NEW DRUG ADDON: CPT

## 2022-06-21 PROCEDURE — 99284 EMERGENCY DEPT VISIT MOD MDM: CPT

## 2022-06-21 PROCEDURE — 74011250636 HC RX REV CODE- 250/636: Performed by: PHYSICIAN ASSISTANT

## 2022-06-21 PROCEDURE — 70450 CT HEAD/BRAIN W/O DYE: CPT

## 2022-06-21 RX ORDER — DIPHENHYDRAMINE HYDROCHLORIDE 50 MG/ML
25 INJECTION, SOLUTION INTRAMUSCULAR; INTRAVENOUS
Status: COMPLETED | OUTPATIENT
Start: 2022-06-21 | End: 2022-06-21

## 2022-06-21 RX ORDER — LIDOCAINE 4 G/100G
PATCH TOPICAL
Qty: 30 EACH | Refills: 0 | Status: SHIPPED | OUTPATIENT
Start: 2022-06-21

## 2022-06-21 RX ORDER — KETOROLAC TROMETHAMINE 15 MG/ML
15 INJECTION, SOLUTION INTRAMUSCULAR; INTRAVENOUS
Status: COMPLETED | OUTPATIENT
Start: 2022-06-21 | End: 2022-06-21

## 2022-06-21 RX ORDER — METOCLOPRAMIDE HYDROCHLORIDE 5 MG/ML
5 INJECTION INTRAMUSCULAR; INTRAVENOUS
Status: COMPLETED | OUTPATIENT
Start: 2022-06-21 | End: 2022-06-21

## 2022-06-21 RX ADMIN — SODIUM CHLORIDE 1000 ML: 900 INJECTION, SOLUTION INTRAVENOUS at 17:24

## 2022-06-21 RX ADMIN — METOCLOPRAMIDE HYDROCHLORIDE 5 MG: 5 INJECTION INTRAMUSCULAR; INTRAVENOUS at 20:59

## 2022-06-21 RX ADMIN — DIPHENHYDRAMINE HYDROCHLORIDE 25 MG: 50 INJECTION, SOLUTION INTRAMUSCULAR; INTRAVENOUS at 20:57

## 2022-06-21 RX ADMIN — KETOROLAC TROMETHAMINE 15 MG: 15 INJECTION, SOLUTION INTRAMUSCULAR; INTRAVENOUS at 21:01

## 2022-06-21 NOTE — ED NOTES
I have introduced myself to the patient and discussed anticipated plan of care. Patient resting quietly on stretcher in low and locked position. Female visitor at bedside. Call bell in reach. Side rail up x1. Room darkened for comfort. Warm blanket brought for comfort.

## 2022-06-21 NOTE — ED TRIAGE NOTES
Patient c/o frontal headache and back pain that extends from shoulders to waist.  Denies fall, trauma or known injury. Denies any anticoagulant use.

## 2022-06-21 NOTE — ED PROVIDER NOTES
EMERGENCY DEPARTMENT HISTORY AND PHYSICAL EXAM      Date: 6/21/2022  Patient Name: Raffi Lujan. History of Presenting Illness     Chief Complaint   Patient presents with    Headache    Back Pain       History Provided By: Patient    HPI: Raffi Lujan., 47 y.o. male PMHx significant for DM, htn, hypercholesterolemia,  presents ambulatory to the ED. Patient reports constant throbbing headache to the top of his head and to the frontal area that began yesterday afternoon. Patient reports headache was worse when it first began and has since improved. Rates pain 7 out of 10 currently. She reports photophobia and intermittent blurred vision. Patient also reports intermittent dizziness. Denies neck pain. Patient reports mid and low back pain that is new as well. Denies numbness/tingling, radiating pain, saddle anesthesia, loss of bowel or bladder function, weakness. There are no other complaints, changes, or physical findings at this time. PCP: Ernestina Cloud NP    No current facility-administered medications on file prior to encounter. Current Outpatient Medications on File Prior to Encounter   Medication Sig Dispense Refill    Basaglar KwikPen U-100 Insulin 100 unit/mL (3 mL) inpn 65 Units.  lisinopril-hydroCHLOROthiazide (PRINZIDE, ZESTORETIC) 20-25 mg per tablet Take 1 Tablet by mouth daily. 90 Tablet 2    empagliflozin (Jardiance) 25 mg tablet Take 1 Tablet by mouth daily. 90 Tablet 2    dulaglutide (Trulicity) 3 SE/7.0 mL pnij 3 mg by SubCUTAneous route every seven (7) days. 12 Each 3    sildenafil citrate (VIAGRA) 100 mg tablet Take 1 Tablet by mouth as needed (45 mins prior to intercourse). 10 Tablet 1    ergocalciferol (ERGOCALCIFEROL) 1,250 mcg (50,000 unit) capsule TAKE 1 CAPSULE BY MOUTH EVERY SEVEN (7) DAYS.  12 Capsule 1    rosuvastatin (CRESTOR) 20 mg tablet TAKE 1 TABLET BY MOUTH EVERY DAY AT NIGHT 90 Tablet 1    multivitamin (ONE A DAY) tablet Take 1 Tab by mouth daily.      [DISCONTINUED] memantine (NAMENDA) 5 mg tablet Take 5 mg by mouth two (2) times a day.  [DISCONTINUED] tamsulosin (Flomax) 0.4 mg capsule Take 1 Capsule by mouth daily. (Patient not taking: Reported on 2/3/2022) 900 Capsule 1    flash glucose sensor (FreeStyle Jarad 14 Day Sensor) kit Apply new sensor every 14 days to monitor blood sugar as directed. Please dispense 2 sensors. 2 Kit 11    Insulin Needles, Disposable, (BD Ultra-Fine Short Pen Needle) 31 gauge x 5/16\" ndle Inject 80 units by subcutaneous route every night. 1 Package 11    Blood Pressure Kit-Extra Large kit Take blood pressure as directed (Patient not taking: Reported on 5/4/2022) 1 Each 0    flash glucose scanning reader (FREESTYLE JARAD 14 DAY READER) misc Use to monitor blood sugar at least 3 times daily 1 Each 0       Past History     Past Medical History:  Past Medical History:   Diagnosis Date    Diabetes ([address]     Diabetic gangrene ([address]     Hypercholesterolemia     Hypertension     Intracranial hemorrhage (HCC)        Past Surgical History:  Past Surgical History:   Procedure Laterality Date    HX APPENDECTOMY         Family History:  Family History   Problem Relation Age of Onset    Cancer Mother         unknown ? breast    Heart Disease Father     No Known Problems Sister     Cancer Brother     No Known Problems Sister     No Known Problems Brother     No Known Problems Brother     Diabetes Daughter     Diabetes Daughter        Social History:  Social History     Tobacco Use    Smoking status: Current Some Day Smoker    Smokeless tobacco: Never Used   Substance Use Topics    Alcohol use: Yes     Comment: Occassional use    Drug use: Yes     Types: Marijuana     Comment: 2-3 times per month       Allergies: Allergies   Allergen Reactions    Pcn [Penicillins] Other (comments)     Unknown was told when a child         Review of Systems   Review of Systems   Constitutional: Negative for chills and fever. HENT: Negative for facial swelling. Eyes: Negative for photophobia and visual disturbance. Respiratory: Negative for shortness of breath. Cardiovascular: Negative for chest pain. Gastrointestinal: Negative for abdominal pain, nausea and vomiting. Genitourinary: Negative for flank pain. Musculoskeletal: Positive for back pain. Skin: Negative for color change, pallor, rash and wound. Neurological: Positive for headaches. Negative for dizziness, weakness and light-headedness. All other systems reviewed and are negative. Physical Exam   Physical Exam  Vitals and nursing note reviewed. Constitutional:       General: He is not in acute distress. Appearance: He is well-developed. Comments: Pt in NAD   HENT:      Head: Normocephalic and atraumatic. Eyes:      Conjunctiva/sclera: Conjunctivae normal.      Comments: PERRL  EOM intact   Neck:      Comments: No nuchal ridigidty  Cardiovascular:      Rate and Rhythm: Normal rate and regular rhythm. Heart sounds: Normal heart sounds. Pulmonary:      Effort: Pulmonary effort is normal. No respiratory distress. Breath sounds: Normal breath sounds. Abdominal:      General: Bowel sounds are normal.      Palpations: Abdomen is soft. Tenderness: There is no abdominal tenderness. Musculoskeletal:         General: Normal range of motion. Thoracic back: Normal.      Lumbar back: Normal.      Comments: No midline tenderness   Skin:     General: Skin is warm. Findings: No rash. Neurological:      Mental Status: He is alert and oriented to person, place, and time. Cranial Nerves: No cranial nerve deficit.       Comments: A&Ox4  Speech clear  Gait stable  Facial muscles equal and intact  Strength 5/5 in all extremities  Sensation intact in all extremities  (-) pronator drift  Left sided finger to nose dysmetria  No dysdiadochokinesia   Psychiatric:         Behavior: Behavior normal.         Diagnostic Study Results Labs -   No results found for this or any previous visit (from the past 12 hour(s)). Radiologic Studies -   CT HEAD WO CONT    (Results Pending)   MRI BRAIN WO CONT    (Results Pending)     CT Results  (Last 48 hours)    None        CXR Results  (Last 48 hours)    None          Medical Decision Making   I am the first provider for this patient. I reviewed the vital signs, available nursing notes, past medical history, past surgical history, family history and social history. Vital Signs-Reviewed the patient's vital signs. Patient Vitals for the past 12 hrs:   Temp Pulse Resp BP SpO2   06/21/22 1638 98.8 °F (37.1 °C) 77 22 113/67 97 %       Records Reviewed: Nursing Notes, Old Medical Records, Previous Radiology Studies and Previous Laboratory Studies    Provider Notes (Medical Decision Making):   DDx: Migraine vs cluster vs tension headache, ICH, CVA    48 yo M who presents with headache x 1 day with intermittent photophobia. On exam, intermittent abnormal finger to nose with nystagmus. ED Course:   Initial assessment performed. The patients presenting problems have been discussed, and they are in agreement with the care plan formulated and outlined with them. I have encouraged them to ask questions as they arise throughout their visit. 1720: Dr Reyes Galvan evaluated pt at bedside and recommended MRI to evaluate for posterior stroke. 2030: Pt re-evaluated for headache. Pt reports sx continue. Discussed discussed CT scan and MRI findings with patient. 2040: Transfer of care to oncoming attending, Dr Can Reid at shift change. Plan: Awaiting migraine cocktail. Attestations:    STARR Salazar    Please note that this dictation was completed with [employer], the [employer] voice recognition software. Quite often unanticipated grammatical, syntax, homophones, and other interpretive errors are inadvertently transcribed by the computer software. Please disregard these errors.   Please excuse any errors that have escaped final proofreading. Thank you.

## 2022-06-22 LAB — SARS-COV-2, NAA: NOT DETECTED

## 2022-06-22 NOTE — DISCHARGE INSTRUCTIONS
Follow-up primary care doctor. Make sure you are staying hydrated with plenty of water. Return to the emergency department for any new or worsening symptoms.

## 2022-06-22 NOTE — ED NOTES
Patient care assumed from [place], [place]. Refer to original note for more details. This is a 27-year-old male who presents with headache. Neuroimaging performed which is negative for any acute findings. Patient has received migraine cocktail and is feeling improved. No meningitic signs. He will be discharged at this time. Reassured and instructed on primary care follow-up. Return precautions advised. Verbalizes understanding and agreement with plan.

## 2022-06-23 ENCOUNTER — TRANSCRIBE ORDER (OUTPATIENT)
Dept: REGISTRATION | Age: 52
End: 2022-06-23

## 2022-06-23 ENCOUNTER — HOSPITAL ENCOUNTER (OUTPATIENT)
Dept: LAB | Age: 52
Discharge: HOME | End: 2022-06-23
Payer: COMMERCIAL

## 2022-06-23 ENCOUNTER — HOSPITAL ENCOUNTER (OUTPATIENT)
Dept: GENERAL RADIOLOGY | Age: 52
Discharge: HOME | End: 2022-06-23
Payer: COMMERCIAL

## 2022-06-23 ENCOUNTER — VIRTUAL VISIT (OUTPATIENT)
Dept: FAMILY MEDICINE CLINIC | Age: 52
End: 2022-06-23
Payer: COMMERCIAL

## 2022-06-23 DIAGNOSIS — M54.9 DORSALGIA: Primary | ICD-10-CM

## 2022-06-23 DIAGNOSIS — M54.9 BACK PAIN, UNSPECIFIED BACK LOCATION, UNSPECIFIED BACK PAIN LATERALITY, UNSPECIFIED CHRONICITY: ICD-10-CM

## 2022-06-23 DIAGNOSIS — M54.9 BACK PAIN, UNSPECIFIED BACK LOCATION, UNSPECIFIED BACK PAIN LATERALITY, UNSPECIFIED CHRONICITY: Primary | ICD-10-CM

## 2022-06-23 DIAGNOSIS — M54.9 DORSALGIA: ICD-10-CM

## 2022-06-23 LAB
ANION GAP SERPL CALC-SCNC: 5 MMOL/L (ref 3–18)
BUN SERPL-MCNC: 30 MG/DL (ref 7–18)
BUN/CREAT SERPL: 19 (ref 12–20)
CALCIUM SERPL-MCNC: 9.8 MG/DL (ref 8.5–10.1)
CHLORIDE SERPL-SCNC: 98 MMOL/L (ref 100–111)
CO2 SERPL-SCNC: 31 MMOL/L (ref 21–32)
CREAT SERPL-MCNC: 1.6 MG/DL (ref 0.6–1.3)
GLUCOSE SERPL-MCNC: 170 MG/DL (ref 74–99)
POTASSIUM SERPL-SCNC: 4.9 MMOL/L (ref 3.5–5.5)
SODIUM SERPL-SCNC: 134 MMOL/L (ref 136–145)

## 2022-06-23 PROCEDURE — 99214 OFFICE O/P EST MOD 30 MIN: CPT | Performed by: NURSE PRACTITIONER

## 2022-06-23 PROCEDURE — 72072 X-RAY EXAM THORAC SPINE 3VWS: CPT

## 2022-06-23 PROCEDURE — 36415 COLL VENOUS BLD VENIPUNCTURE: CPT

## 2022-06-23 PROCEDURE — 80048 BASIC METABOLIC PNL TOTAL CA: CPT

## 2022-06-23 PROCEDURE — 72052 X-RAY EXAM NECK SPINE 6/>VWS: CPT

## 2022-06-23 PROCEDURE — 72110 X-RAY EXAM L-2 SPINE 4/>VWS: CPT

## 2022-06-23 RX ORDER — CYCLOBENZAPRINE HCL 10 MG
10 TABLET ORAL
Qty: 30 TABLET | Refills: 1 | Status: SHIPPED | OUTPATIENT
Start: 2022-06-23

## 2022-06-23 NOTE — LETTER
NOTIFICATION RETURN TO WORK    6/23/2022 10:44 AM    Mr. Nayely Cardoza [phone]      To Whom It May Concern:    Melodie Leary. [facility]. He will return to work on: 6/24/2022    If there are questions or concerns please have the patient contact our office.         Sincerely,      Jason King NP

## 2022-06-23 NOTE — PROGRESS NOTES
Zoila Beard is a 47 y.o. male who was seen by synchronous (real-time) audio-video technology on 6/23/2022 for Hospital Follow Up (seen by [facility] ER), Back Pain (x's 2-3 days ), and Headache    Assessment & Plan:   Diagnoses and all orders for this visit:    1. Back pain, unspecified back location, unspecified back pain laterality, unspecified chronicity  -     XR SPINE LUMB MIN 4 V; Future  -     XR SPINE THORAC 3 V; Future  -     XR SPINE CERV W OBL/FLEX/EXT MIN 6 V COMP; Future  -     METABOLIC PANEL, BASIC; Future      Follow-up and Dispositions    · Return if symptoms worsen or fail to improve, for keep previous scheduled appt. I spent at least 30 minutes on this visit with this established patient. 712  Subjective:   Patient reports he has been experiencing muscle spasms in his back this past weekend. States pain begins in buttocks radiating upward to his shoulders. Headache: patient reports pain begins in the back of his head radiating forward. Denies dizziness. Patient was seen at [facility] ED on 6/21/2022 for the following:  Diagnosis Comment   Nonintractable headache, unspecified chronicity pattern, unspecified headache type    Back pain, unspecified back location, unspecified back pain laterality, unspecified chronicity      History of Presenting Illness          Chief Complaint   Patient presents with    Headache    Back Pain       History Provided By: Patient     HPI: Zoila Beard, 47 y.o. male PMHx significant for DM, htn, hypercholesterolemia,  presents ambulatory to the ED. Patient reports constant throbbing headache to the top of his head and to the frontal area that began yesterday afternoon. Patient reports headache was worse when it first began and has since improved. Rates pain 7 out of 10 currently. She reports photophobia and intermittent blurred vision. Patient also reports intermittent dizziness. Denies neck pain.   Patient reports mid and low back pain that is new as well. Denies numbness/tingling, radiating pain, saddle anesthesia, loss of bowel or bladder function, weakness.     There are no other complaints, changes, or physical findings at this time. Prior to Admission medications    Medication Sig Start Date End Date Taking? Authorizing Provider   lidocaine 4 % patch Apply to back and leave on for 12 hours 6/21/22  Yes [place] Bobby, DO   Basaglar KwikPen U-100 Insulin 100 unit/mL (3 mL) inpn 65 Units. 3/31/22  Yes Provider, Historical   lisinopril-hydroCHLOROthiazide (PRINZIDE, ZESTORETIC) 20-25 mg per tablet Take 1 Tablet by mouth daily. 5/4/22  Yes Jill Sandy NP   empagliflozin (Jardiance) 25 mg tablet Take 1 Tablet by mouth daily. 5/4/22  Yes Jill Sandy NP   dulaglutide (Trulicity) 3 SL/7.4 mL pnij 3 mg by SubCUTAneous route every seven (7) days. 5/4/22  Yes Jill Sandy NP   sildenafil citrate (VIAGRA) 100 mg tablet Take 1 Tablet by mouth as needed (45 mins prior to intercourse). 5/4/22  Yes Jill Sandy NP   ergocalciferol (ERGOCALCIFEROL) 1,250 mcg (50,000 unit) capsule TAKE 1 CAPSULE BY MOUTH EVERY SEVEN (7) DAYS. 4/13/22  Yes Jill Sandy NP   rosuvastatin (CRESTOR) 20 mg tablet TAKE 1 TABLET BY MOUTH EVERY DAY AT NIGHT 4/13/22  Yes Jill Sandy NP   flash glucose sensor (FreeStyle Jarad 14 Day Sensor) kit Apply new sensor every 14 days to monitor blood sugar as directed. Please dispense 2 sensors. 6/22/21  Yes Jill Sandy NP   Insulin Needles, Disposable, (BD Ultra-Fine Short Pen Needle) 31 gauge x 5/16\" ndle Inject 80 units by subcutaneous route every night. 6/22/20  Yes Jill Sandy NP   flash glucose scanning reader (FREESTYLE JARAD 14 DAY READER) [facility] Use to monitor blood sugar at least 3 times daily 10/29/19  Yes Jill Sandy NP   multivitamin (ONE A DAY) tablet Take 1 Tab by mouth daily.    Yes Provider, Historical   Blood Pressure Kit-Extra Large kit Take blood pressure as directed  Patient not taking: Reported on 5/4/2022 5/11/20   Malka Velasquez NP     Patient Active Problem List   Diagnosis Code    Obesity, morbid [facility]) E66.01     Patient Active Problem List    Diagnosis Date Noted    Obesity, morbid ([facility] 08/14/2018     Current Outpatient Medications   Medication Sig Dispense Refill    lidocaine 4 % patch Apply to back and leave on for 12 hours 30 Each 0    Basaglar KwikPen U-100 Insulin 100 unit/mL (3 mL) inpn 65 Units.  lisinopril-hydroCHLOROthiazide (PRINZIDE, ZESTORETIC) 20-25 mg per tablet Take 1 Tablet by mouth daily. 90 Tablet 2    empagliflozin (Jardiance) 25 mg tablet Take 1 Tablet by mouth daily. 90 Tablet 2    dulaglutide (Trulicity) 3 TE/7.9 mL pnij 3 mg by SubCUTAneous route every seven (7) days. 12 Each 3    sildenafil citrate (VIAGRA) 100 mg tablet Take 1 Tablet by mouth as needed (45 mins prior to intercourse). 10 Tablet 1    ergocalciferol (ERGOCALCIFEROL) 1,250 mcg (50,000 unit) capsule TAKE 1 CAPSULE BY MOUTH EVERY SEVEN (7) DAYS. 12 Capsule 1    rosuvastatin (CRESTOR) 20 mg tablet TAKE 1 TABLET BY MOUTH EVERY DAY AT NIGHT 90 Tablet 1    flash glucose sensor (FreeStyle Jarad 14 Day Sensor) kit Apply new sensor every 14 days to monitor blood sugar as directed. Please dispense 2 sensors. 2 Kit 11    Insulin Needles, Disposable, (BD Ultra-Fine Short Pen Needle) 31 gauge x 5/16\" ndle Inject 80 units by subcutaneous route every night. 1 Package 11    flash glucose scanning reader (FREESTYLE JARAD 14 DAY READER) [facility] Use to monitor blood sugar at least 3 times daily 1 Each 0    multivitamin (ONE A DAY) tablet Take 1 Tab by mouth daily.       Blood Pressure Kit-Extra Large kit Take blood pressure as directed (Patient not taking: Reported on 5/4/2022) 1 Each 0     Allergies   Allergen Reactions    Pcn [Penicillins] Other (comments)     Unknown was told when a child     Past Medical History:   Diagnosis Date    Diabetes ([facility]     Diabetic gangrene ([facility]     Hypercholesterolemia     Hypertension     Intracranial hemorrhage (HCC)      Past Surgical History:   Procedure Laterality Date    HX APPENDECTOMY       Family History   Problem Relation Age of Onset    Cancer Mother         unknown ? breast    Heart Disease Father     No Known Problems Sister     Cancer Brother     No Known Problems Sister     No Known Problems Brother     No Known Problems Brother     Diabetes Daughter     Diabetes Daughter      Social History     Tobacco Use    Smoking status: Current Some Day Smoker    Smokeless tobacco: Never Used   Substance Use Topics    Alcohol use: Yes     Comment: Occassional use     ROS    Objective:   No flowsheet data found. General: alert, cooperative, no distress   Mental  status: normal mood, behavior, speech, dress, motor activity, and thought processes, able to follow commands   HENT: NCAT   Neck: no visualized mass   Resp: no respiratory distress   Neuro: no gross deficits   Skin: no discoloration or lesions of concern on visible areas   Psychiatric: normal affect, consistent with stated mood, no evidence of hallucinations     Additional exam findings: We discussed the expected course, resolution and complications of the diagnosis(es) in detail. Medication risks, benefits, costs, interactions, and alternatives were discussed as indicated. I advised him to contact the office if his condition worsens, changes or fails to improve as anticipated. He expressed understanding with the diagnosis(es) and plan. Vin [place] Real, was evaluated through a synchronous (real-time) audio-video encounter. The patient (or guardian if applicable) is aware that this is a billable service, which includes applicable co-pays. This Virtual Visit was conducted with patient's (and/or legal guardian's) consent.  The visit was conducted pursuant to the emergency declaration under the [facility], [facility]5 waiver authority and the Coronavirus Preparedness and Response Supplemental Appropriations Act. Patient identification was verified, and a caregiver was present when appropriate. The patient was located at: Home: [place] [phone]  The provider was located at:  Facility (Appt Department): [address],

## 2022-06-23 NOTE — LETTER
Mr. Elver Barbour [phone]      To Whom It May Concern:    Zoila Beard is [facility]. He will return to work on: 6/27/2022    If there are questions or concerns please have the patient contact our office.         Sincerely,      Jill Ba NP

## 2022-06-23 NOTE — PROGRESS NOTES
Vincent Pichardo (: 1970) is a 47 y.o. male, established patient, here for evaluation of the following chief complaint(s):   Hospital Follow Up (seen by [facility] ER), Back Pain (x's 2-3 days ), and Headache            Vincent Pichardo, was evaluated through a synchronous (real-time) audio-video encounter. The patient (or guardian if applicable) is aware that this is a billable service, which includes applicable co-pays. This Virtual Visit was conducted with patient's (and/or legal guardian's) consent. The visit was conducted pursuant to the emergency declaration under the [address] [place] [address] authority and the [employer] Act. Patient identification was verified, and a caregiver was present when appropriate. The patient was located at: [place] [phone]  The provider was located at: Facility (Appt Department): [address]  [address]        [facility] was used to authenticate this note.   -- Gentry Walden

## 2022-06-24 ENCOUNTER — TELEPHONE (OUTPATIENT)
Dept: FAMILY MEDICINE CLINIC | Age: 52
End: 2022-06-24

## 2022-06-24 NOTE — TELEPHONE ENCOUNTER
Pt sabas from [facility] requesting message sent to provider to see if she would extend his return to work note date form 6/24 to 6/27.  Please adv [phone]

## 2022-07-28 ENCOUNTER — TELEPHONE (OUTPATIENT)
Dept: FAMILY MEDICINE CLINIC | Age: 52
End: 2022-07-28

## 2022-08-31 ENCOUNTER — OFFICE VISIT (OUTPATIENT)
Dept: FAMILY MEDICINE CLINIC | Age: 52
End: 2022-08-31
Payer: COMMERCIAL

## 2022-08-31 VITALS
WEIGHT: 291.4 LBS | TEMPERATURE: 98.4 F | HEIGHT: 70 IN | OXYGEN SATURATION: 96 % | HEART RATE: 92 BPM | SYSTOLIC BLOOD PRESSURE: 107 MMHG | RESPIRATION RATE: 20 BRPM | BODY MASS INDEX: 41.72 KG/M2 | DIASTOLIC BLOOD PRESSURE: 60 MMHG

## 2022-08-31 DIAGNOSIS — E78.1 HYPERTRIGLYCERIDEMIA: ICD-10-CM

## 2022-08-31 DIAGNOSIS — I10 ESSENTIAL HYPERTENSION: ICD-10-CM

## 2022-08-31 DIAGNOSIS — E55.9 HYPOVITAMINOSIS D: ICD-10-CM

## 2022-08-31 DIAGNOSIS — E11.65 TYPE 2 DIABETES MELLITUS WITH HYPERGLYCEMIA, UNSPECIFIED WHETHER LONG TERM INSULIN USE (HCC): Primary | ICD-10-CM

## 2022-08-31 DIAGNOSIS — M79.661 PAIN IN RIGHT LOWER LEG: ICD-10-CM

## 2022-08-31 DIAGNOSIS — M79.671 RIGHT FOOT PAIN: ICD-10-CM

## 2022-08-31 PROCEDURE — 99214 OFFICE O/P EST MOD 30 MIN: CPT | Performed by: NURSE PRACTITIONER

## 2022-08-31 PROCEDURE — 3046F HEMOGLOBIN A1C LEVEL >9.0%: CPT | Performed by: NURSE PRACTITIONER

## 2022-08-31 RX ORDER — DICLOFENAC SODIUM 10 MG/G
GEL TOPICAL
Qty: 100 G | Refills: 1 | Status: SHIPPED | OUTPATIENT
Start: 2022-08-31

## 2022-08-31 RX ORDER — TRIAMCINOLONE ACETONIDE 1 MG/G
CREAM TOPICAL 2 TIMES DAILY
Qty: 30 G | Refills: 1 | Status: SHIPPED | OUTPATIENT
Start: 2022-08-31

## 2022-08-31 RX ORDER — FLASH GLUCOSE SENSOR
KIT MISCELLANEOUS
Qty: 2 KIT | Refills: 11 | Status: SHIPPED | OUTPATIENT
Start: 2022-08-31

## 2022-08-31 RX ORDER — NYSTATIN 100000 U/G
CREAM TOPICAL 2 TIMES DAILY
Qty: 30 G | Refills: 1 | Status: SHIPPED | OUTPATIENT
Start: 2022-08-31

## 2022-08-31 NOTE — PROGRESS NOTES
1. \"Have you been to the ER, urgent care clinic since your last visit? Hospitalized since your last visit? \" No    2. \"Have you seen or consulted any other health care providers outside of the [address] [place] [address] since your last visit? \" No     3. For patients aged 39-70: Has the patient had a colonoscopy / FIT/ Cologuard?  No

## 2022-08-31 NOTE — PROGRESS NOTES
diclSubjective:    Hyacinth Conley is a 47y.o. year old male seen for follow up of diabetes. He also has hypertension and hyperlipidemia. Diabetic Review of Systems - medication compliance: compliant all of the time, diabetic diet compliance: noncompliant some of the time, home glucose monitoring: is performed regularly with freestyle amanda 140-200s, mostly in the 240-260, further diabetic ROS: no polyuria or polydipsia, no chest pain, dyspnea or TIA's, no numbness, tingling or pain in extremities. Other symptoms and concerns: patient states he was informed by podiatrist Dr. Nas Lux he has arthritis in his right great toe. Comments pain has been getting worse. Reports podiatrist is considering surgery. Reports pain begins in his toe and radiates upward. Has been taking tylenol with mild relief. 5/4/2022  Subjective:     Hyacinth Conley is a 47y.o. year old male seen for follow up of diabetes. He also has hypertension and hyperlipidemia. Diabetic Review of Systems - medication compliance: compliant all of the time, taking Basaglar 60 units daily, diabetic diet compliance: states he does continue to eat smaller portions, home glucose monitoring: is performed regularly, 100-200 wearing amanda, further diabetic ROS: no polyuria or polydipsia, no chest pain, dyspnea or TIA's, no numbness, tingling or pain in extremities, last eye exam approximately Jan/Feb 2022. Other symptoms and concerns: patient states he has difficulty loosing weight. Inquiring if weight loss surgery would be a good idea for him. Patient states he started having upper abd cramping only with sneezing when his 'stomach tightens'. Reports today during exam when going from laying to sitting position he felt his stomach fluttering, denies actual cramping at this time.      2/3/2022 virtual visit  Subjective:   Prediabetic Review of Systems - medication compliance: compliant all of the time, diet compliance: patient states his portions are smaller, home glucose monitoring: is performed sporadically, further ROS: no polyuria or polydipsia, no chest pain, dyspnea or TIA's, no numbness, tingling or pain in extremities. Patient states he has not had a amanda sensor on in 3 weeks, reports the last time his checked his glucose it was 150-180. Patient states he was started on namenda by neurologist Dr. Tawanda Pérez. States he was sent to another provider for 'memory' testing. Assessment & Plan:   Diagnoses and all orders for this visit:     1. Type 2 diabetes mellitus with hyperglycemia, unspecified whether long term insulin use (HCC)     Reinforced importance of checking glucose as advised. Congratulated on decreasing portion sizes. Encouraged to adhere to ADA diet as previously advised. Follow-up and Dispositions    Return in about 3 months (around 5/3/2022) for DM/HTN/HLD, in office follow up, fasting labs 1 wk prior. 12/22/2021   Subjective:  Eva Landaverde is a 49y.o. year old male seen for follow up of diabetes. He also has hypertension and hyperlipidemia. Diabetic Review of Systems - medication compliance: compliant all of the time, states he has been taking 60-70 units, has been taking this range for awhile for the last several months, diabetic diet compliance: noncompliant some of the time, home glucose monitoring: is performed regularly, nonfasting values range 110-160/180, 200 after eating, further diabetic ROS: no polyuria or polydipsia, no chest pain, dyspnea or TIA's, no numbness, tingling or pain in extremities. Other symptoms and concerns: patient states since he began taking his medication after getting off of work in the morning between [id number] and has noticed urinary urgency has improved.       9/22/2021  Diabetic Review of Systems - medication compliance: compliant most of the time, diabetic diet compliance: noncompliant some of the time, home glucose monitoring: is performed sporadically, fasting values range mid 100s, further diabetic ROS: no polyuria or polydipsia, no chest pain, dyspnea or TIA's, no numbness, tingling or pain in extremities, last eye exam 2/8/2021. Patient states he has been eating more sweets as of late. Other symptoms and concerns: patients he has been having urinary at night. Reports has not been taking flomax as prescribed. Subjective:  Derrell Brito is a 49y.o. year old male seen for follow up of diabetes. He also has hypertension and hyperlipidemia. Diabetic Review of Systems - medication compliance: compliant all of the time, states he has been taking 60-70 units, has been taking this range for awhile for the last several months, diabetic diet compliance: noncompliant some of the time, home glucose monitoring: is performed regularly, nonfasting values range 110-160/180, 200 after eating, further diabetic ROS: no polyuria or polydipsia, no chest pain, dyspnea or TIA's, no numbness, tingling or pain in extremities. Other symptoms and concerns: patient states since he began taking his medication after getting off of work in the morning between [id number] and has noticed urinary urgency has improved. Patient Active Problem List   Diagnosis Code    [facility], mor[facility] E66.01     Current Outpatient Medications   Medication Sig Dispense Refill    cyclobenzaprine (FLEXERIL) 10 mg tablet Take 1 Tablet by mouth nightly as needed for Muscle Spasm(s). 30 Tablet 1    lidocaine 4 % patch Apply to back and leave on for 12 hours 30 Each 0    Basaglar KwikPen U-100 Insulin 100 unit/mL (3 mL) inpn 65 Units. lisinopril-hydroCHLOROthiazide (PRINZIDE, ZESTORETIC) 20-25 mg per tablet Take 1 Tablet by mouth daily. 90 Tablet 2    empagliflozin (Jardiance) 25 mg tablet Take 1 Tablet by mouth daily. 90 Tablet 2    dulaglutide (Trulicity) 3 MA/0.7 mL pnij 3 mg by SubCUTAneous route every seven (7) days.  12 Each 3    sildenafil citrate (VIAGRA) 100 mg tablet Take 1 Tablet by mouth as needed (45 mins prior to intercourse). 10 Tablet 1    ergocalciferol (ERGOCALCIFEROL) 1,250 mcg (50,000 unit) capsule TAKE 1 CAPSULE BY MOUTH EVERY SEVEN (7) DAYS. 12 Capsule 1    rosuvastatin (CRESTOR) 20 mg tablet TAKE 1 TABLET BY MOUTH EVERY DAY AT NIGHT 90 Tablet 1    flash glucose sensor (FreeStyle Jarad 14 Day Sensor) kit Apply new sensor every 14 days to monitor blood sugar as directed. Please dispense 2 sensors. 2 Kit 11    Insulin Needles, Disposable, (BD Ultra-Fine Short Pen Needle) 31 gauge x 5/16\" ndle Inject 80 units by subcutaneous route every night. 1 Package 11    Blood Pressure Kit-Extra Large kit Take blood pressure as directed (Patient not taking: Reported on 5/4/2022) 1 Each 0    flash glucose scanning reader (FREESTYLE JARAD 14 DAY READER) misc Use to monitor blood sugar at least 3 times daily 1 Each 0    multivitamin (ONE A DAY) tablet Take 1 Tab by mouth daily. Allergies   Allergen Reactions    Pcn [Penicillins] Other (comments)     Unknown was told when a child     Past Surgical History:   Procedure Laterality Date    HX APPENDECTOMY       Family History   Problem Relation Age of Onset    Cancer Mother         unknown ?  breast    Heart Disease Father     No Known Problems Sister     Cancer Brother     No Known Problems Sister     No Known Problems Brother     No Known Problems Brother     Diabetes Daughter     Diabetes Daughter       Lab Results   Component Value Date/Time    Cholesterol, total 158 05/04/2022 09:34 AM    HDL Cholesterol 32 (L) 05/04/2022 09:34 AM    LDL, calculated 51.8 05/04/2022 09:34 AM    VLDL, calculated 74.2 05/04/2022 09:34 AM    Triglyceride 371 (H) 05/04/2022 09:34 AM    CHOL/HDL Ratio 4.9 05/04/2022 09:34 AM     Lab Results   Component Value Date/Time    Sodium 134 (L) 06/23/2022 12:12 PM    Potassium 4.9 06/23/2022 12:12 PM    Chloride 98 (L) 06/23/2022 12:12 PM    CO2 31 06/23/2022 12:12 PM    Anion gap 5 06/23/2022 12:12 PM    Glucose 170 (H) 06/23/2022 12:12 PM    BUN 30 (H) 06/23/2022 12:12 PM    Creatinine 1.60 (H) 06/23/2022 12:12 PM    BUN/Creatinine ratio 19 06/23/2022 12:12 PM    GFR est AA 56 (L) 06/23/2022 12:12 PM    GFR est non-AA 46 (L) 06/23/2022 12:12 PM    Calcium 9.8 06/23/2022 12:12 PM    Bilirubin, total 0.4 05/04/2022 09:34 AM    Alk.  phosphatase 60 05/04/2022 09:34 AM    Protein, total 7.9 05/04/2022 09:34 AM    Albumin 4.2 05/04/2022 09:34 AM    Globulin 3.7 05/04/2022 09:34 AM    A-G Ratio 1.1 05/04/2022 09:34 AM    ALT (SGPT) 45 05/04/2022 09:34 AM    AST (SGOT) 32 05/04/2022 09:34 AM     Lab Results   Component Value Date/Time    WBC 5.4 11/06/2021 03:35 AM    HGB 14.4 11/06/2021 03:35 AM    HCT 44.6 11/06/2021 03:35 AM    PLATELET 783 35/14/5083 03:35 AM    MCV 84.5 11/06/2021 03:35 AM     Lab Results   Component Value Date/Time    Hemoglobin A1c 9.0 (H) 05/04/2022 09:34 AM    Hemoglobin A1c (POC) 8.9 03/10/2020 07:30 AM     Lab Results   Component Value Date/Time    Microalbumin/Creat ratio (mg/g creat) 406 (H) 06/22/2021 02:20 PM    Microalbumin,urine random 33.30 (H) 06/22/2021 02:20 PM     Wt Readings from Last 3 Encounters:   06/21/22 296 lb (134.3 kg)   05/04/22 301 lb (136.5 kg)   12/22/21 305 lb (138.3 kg)     Last Point of Care HGB A1C  Hemoglobin A1c (POC)   Date Value Ref Range Status   03/10/2020 8.9 % Final      BP Readings from Last 3 Encounters:   06/21/22 124/72   05/04/22 99/66   12/22/21 120/69     Results for orders placed or performed during the hospital encounter of 17/05/19   METABOLIC PANEL, BASIC   Result Value Ref Range    Sodium 134 (L) 136 - 145 mmol/L    Potassium 4.9 3.5 - 5.5 mmol/L    Chloride 98 (L) 100 - 111 mmol/L    CO2 31 21 - 32 mmol/L    Anion gap 5 3.0 - 18 mmol/L    Glucose 170 (H) 74 - 99 mg/dL    BUN 30 (H) 7.0 - 18 MG/DL    Creatinine 1.60 (H) 0.6 - 1.3 MG/DL    BUN/Creatinine ratio 19 12 - 20      GFR est AA 56 (L) >60 ml/min/1.73m2    GFR est non-AA 46 (L) >60 ml/min/1.73m2    Calcium 9.8 8.5 - 10.1 MG/DL       Last Diabetic Foot Exam on:   Diabetic Foot and Eye Exam  Status   Topic Date Due    Diabetic Foot Care  06/22/2022    Eye Exam  02/21/2024       Objective:  Visit Vitals  /60 (BP 1 Location: Left upper arm, BP Patient Position: Sitting, BP Cuff Size: Large adult)   Pulse 92   Temp 98.4 °F (36.9 °C) (Temporal)   Resp 20   Ht 5' 10\" (1.778 m)   Wt 291 lb 6.4 oz (132.2 kg)   SpO2 96%   BMI 41.81 kg/m²     Awake and alert in no acute distress   Neck supple without lymphadenopathy, no carotid artery bruits auscultated bilaterally. No thyromegaly   Lungs clear throughout   S1 S2 RRR without ectopy or murmur auscultated. Extremities: no clubbing, cyanosis, peripheral edema   Abdomen - soft, nontender, nondistended, no masses or organomegaly  Integumentary: no rashes  Right lower medial leg tender to palpation. Right great toe and anterior foot warm to touch. Reviewed vital signs       Diabetic foot exam:     Left Foot:   Visual Exam: normal    Pulse DP: 2+ (normal)   Filament test: 3/6   Vibratory sensation: normal      Right Foot:   Visual Exam: normal    Pulse DP: 2+ (normal)   Filament test: 3/6   Vibratory sensation: normal      Assessment/Plan:    ICD-10-CM ICD-9-CM    1. Type 2 diabetes mellitus with hyperglycemia, unspecified whether long term insulin use ([facility])  E11.65 250.00 HEMOGLOBIN A1C WITH EAG      LIPID PANEL      METABOLIC PANEL, COMPREHENSIVE      MICROALBUMIN, UR, RAND W/ MICROALB/CREAT RATIO       DIABETES FOOT EXAM      2. Essential hypertension  I10 401.9       3. Hypertriglyceridemia  E78.1 272.1       4. Hypovitaminosis D  E55.9 268.9 VITAMIN D, 25 HYDROXY      5. Right foot pain  M79.671 729.5 URIC ACID      6.  Pain in right lower leg  M79.661 729.5 DUPLEX LOWER EXT VENOUS RIGHT        Orders Placed This Encounter    HEMOGLOBIN A1C WITH EAG    LIPID PANEL    METABOLIC PANEL, COMPREHENSIVE    MICROALBUMIN, UR, RAND W/ MICROALB/CREAT RATIO    URIC ACID VITAMIN D, 25 HYDROXY    METABOLIC PANEL, COMPREHENSIVE    LIPID PANEL    MICROALBUMIN, UR, RAND W/ MICROALB/CREAT RATIO    HEMOGLOBIN A1C WITH EAG    VITAMIN D, 25 HYDROXY    URIC ACID    nystatin (MYCOSTATIN) topical cream    triamcinolone acetonide (KENALOG) 0.1 % topical cream    diclofenac (VOLTAREN) 1 % gel    flash glucose sensor (FreeStyle Jarad 2 Sensor) kit     needs improvement, needs to follow diet more regularly  Issues reviewed with him: low cholesterol diet, weight control and daily exercise discussed, all medications, side effects and compliance discussed carefully, and annual eye examinations at Ophthalmology discussed. Increase lantus to 75 units daily  Complete current supply of trulicity approx 8. Once complete will begin ozempic 1 mg weekly. I have discussed the diagnosis with the patient and the intended plan as seen in the above orders. The patient has received an after-visit summary and questions were answered concerning future plans. I have discussed medication side effects and warnings with the patient as well. Patient agreeable with above plan and verbalizes understanding. Follow-up and Dispositions    Return in about 4 weeks (around 9/28/2022) for foot/leg pain, DM, virtual follow up.

## 2022-09-01 LAB
25(OH)D3+25(OH)D2 SERPL-MCNC: 39.7 NG/ML (ref 30–100)
ALBUMIN SERPL-MCNC: 4.5 G/DL (ref 3.8–4.9)
ALBUMIN/CREAT UR: 6 MG/G CREAT (ref 0–29)
ALBUMIN/GLOB SERPL: 1.5 {RATIO} (ref 1.2–2.2)
ALP SERPL-CCNC: 77 IU/L (ref 44–121)
ALT SERPL-CCNC: 39 IU/L (ref 0–44)
AST SERPL-CCNC: 28 IU/L (ref 0–40)
BILIRUB SERPL-MCNC: 0.3 MG/DL (ref 0–1.2)
BUN SERPL-MCNC: 38 MG/DL (ref 6–24)
BUN/CREAT SERPL: 22 (ref 9–20)
CALCIUM SERPL-MCNC: 9.1 MG/DL (ref 8.7–10.2)
CHLORIDE SERPL-SCNC: 97 MMOL/L (ref 96–106)
CHOLEST SERPL-MCNC: 160 MG/DL (ref 100–199)
CO2 SERPL-SCNC: 22 MMOL/L (ref 20–29)
CREAT SERPL-MCNC: 1.72 MG/DL (ref 0.76–1.27)
CREAT UR-MCNC: 122 MG/DL
EGFR: 48 ML/MIN/1.73
EST. AVERAGE GLUCOSE BLD GHB EST-MCNC: 237 MG/DL
GLOBULIN SER CALC-MCNC: 3.1 G/DL (ref 1.5–4.5)
GLUCOSE SERPL-MCNC: 177 MG/DL (ref 65–99)
HBA1C MFR BLD: 9.9 % (ref 4.8–5.6)
HDLC SERPL-MCNC: 32 MG/DL
LDLC SERPL CALC-MCNC: 81 MG/DL (ref 0–99)
MICROALBUMIN UR-MCNC: 7.7 UG/ML
POTASSIUM SERPL-SCNC: 4.3 MMOL/L (ref 3.5–5.2)
PROT SERPL-MCNC: 7.6 G/DL (ref 6–8.5)
SODIUM SERPL-SCNC: 138 MMOL/L (ref 134–144)
TRIGL SERPL-MCNC: 287 MG/DL (ref 0–149)
URATE SERPL-MCNC: 8.6 MG/DL (ref 3.8–8.4)
VLDLC SERPL CALC-MCNC: 47 MG/DL (ref 5–40)

## 2022-09-01 PROCEDURE — 99283 EMERGENCY DEPT VISIT LOW MDM: CPT

## 2022-09-02 ENCOUNTER — HOSPITAL ENCOUNTER (EMERGENCY)
Age: 52
Discharge: HOME | End: 2022-09-02
Attending: EMERGENCY MEDICINE
Payer: COMMERCIAL

## 2022-09-02 VITALS
OXYGEN SATURATION: 98 % | HEART RATE: 87 BPM | SYSTOLIC BLOOD PRESSURE: 118 MMHG | BODY MASS INDEX: 41.52 KG/M2 | WEIGHT: 290 LBS | RESPIRATION RATE: 18 BRPM | DIASTOLIC BLOOD PRESSURE: 66 MMHG | TEMPERATURE: 98.5 F | HEIGHT: 70 IN

## 2022-09-02 DIAGNOSIS — L02.611 ABSCESS OF GREAT TOE OF RIGHT FOOT: Primary | ICD-10-CM

## 2022-09-02 DIAGNOSIS — E66.01 OBESITY, MORBID (HCC): ICD-10-CM

## 2022-09-02 PROCEDURE — 74011250637 HC RX REV CODE- 250/637: Performed by: EMERGENCY MEDICINE

## 2022-09-02 RX ORDER — CLINDAMYCIN HYDROCHLORIDE 300 MG/1
300 CAPSULE ORAL 4 TIMES DAILY
Qty: 40 CAPSULE | Refills: 0 | Status: SHIPPED | OUTPATIENT
Start: 2022-09-02 | End: 2022-09-02 | Stop reason: CLARIF

## 2022-09-02 RX ORDER — OXYCODONE AND ACETAMINOPHEN 5; 325 MG/1; MG/1
1 TABLET ORAL
Qty: 12 TABLET | Refills: 0 | Status: SHIPPED | OUTPATIENT
Start: 2022-09-02 | End: 2022-09-07

## 2022-09-02 RX ORDER — CLINDAMYCIN HYDROCHLORIDE 300 MG/1
300 CAPSULE ORAL 4 TIMES DAILY
Qty: 40 CAPSULE | Refills: 0 | Status: SHIPPED | OUTPATIENT
Start: 2022-09-02 | End: 2022-09-02

## 2022-09-02 RX ORDER — CLINDAMYCIN HYDROCHLORIDE 150 MG/1
300 CAPSULE ORAL
Status: COMPLETED | OUTPATIENT
Start: 2022-09-02 | End: 2022-09-02

## 2022-09-02 RX ORDER — CLINDAMYCIN HYDROCHLORIDE 300 MG/1
300 CAPSULE ORAL 4 TIMES DAILY
Qty: 40 CAPSULE | Refills: 0 | Status: SHIPPED | OUTPATIENT
Start: 2022-09-02 | End: 2022-09-12

## 2022-09-02 RX ORDER — OXYCODONE AND ACETAMINOPHEN 5; 325 MG/1; MG/1
2 TABLET ORAL
Status: COMPLETED | OUTPATIENT
Start: 2022-09-02 | End: 2022-09-02

## 2022-09-02 RX ORDER — DOXYCYCLINE 100 MG/1
100 CAPSULE ORAL 2 TIMES DAILY
Qty: 20 CAPSULE | Refills: 0 | Status: SHIPPED | OUTPATIENT
Start: 2022-09-02 | End: 2022-09-02

## 2022-09-02 RX ADMIN — CLINDAMYCIN HYDROCHLORIDE 300 MG: 150 CAPSULE ORAL at 00:40

## 2022-09-02 RX ADMIN — OXYCODONE HYDROCHLORIDE AND ACETAMINOPHEN 2 TABLET: 5; 325 TABLET ORAL at 00:40

## 2022-09-02 NOTE — ED PROVIDER NOTES
To ER with complaint having pain in her right big toe x2 to 3 days. Patient that he is diabetic but he has no fever chills shortness of breath or pain and pulmonologist so that was pulled this morning. 60-year-old male diabetic who presents to ER with edema on the lateral side of his right great toe. He complained having mild pain in the toe. No nausea vomiting diarrhea. 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  breast    Heart Disease Father     No Known Problems Sister     Cancer Brother     No Known Problems Sister     No Known Problems Brother     No Known Problems Brother     Diabetes Daughter     Diabetes Daughter        Social History     Socioeconomic History    Marital status:      Spouse name: Not on file    Number of children: Not on file    Years of education: Not on file    Highest education level: Not on file   Occupational History    Not on file   Tobacco Use    Smoking status: Some Days    Smokeless tobacco: Never   Vaping Use    Vaping Use: Never used   Substance and Sexual Activity    Alcohol use: Yes     Comment: Occassional use    Drug use: Yes     Types: Marijuana     Comment: 2-3 times per month    Sexual activity: Yes     Partners: Female     Birth control/protection: None   Other Topics Concern    Not on file   Social History Narrative    Not on file     Social Determinants of Health     Financial Resource Strain: Medium Risk    Difficulty of Paying Living Expenses: Somewhat hard   Food Insecurity: No Food Insecurity    Worried About Running Out of Food in the Last Year: Never true    Ran Out of Food in the Last Year: Never true   Transportation Needs: Not on file   Physical Activity: Not on file   Stress: Not on file   Social Connections: Not on file   Intimate Partner Violence: Not on file   Housing Stability: Not on file     ALLERGIES: Pcn [penicillins]    Review of Systems   Constitutional: Negative. HENT: Negative. Eyes: Negative. Respiratory: Negative. Cardiovascular: Negative. Gastrointestinal: Negative. Musculoskeletal: Negative. Vitals:    09/02/22 0002   BP: 118/66   Pulse: 87   Resp: 18   Temp: 98.5 °F (36.9 °C)   SpO2: 98%   Weight: 131.5 kg (290 lb)   Height: 5' 10\" (1.778 m)            Physical Exam  Vitals and nursing note reviewed. Constitutional:       General: He is not in acute distress. Appearance: He is well-developed. HENT:      Head: Normocephalic. Eyes:      Conjunctiva/sclera: Conjunctivae normal.      Pupils: Pupils are equal, round, and reactive to light. Cardiovascular:      Rate and Rhythm: Normal rate and regular rhythm. Heart sounds: Normal heart sounds. No murmur heard. Pulmonary:      Effort: Pulmonary effort is normal. No respiratory distress. Breath sounds: Normal breath sounds. No wheezing or rales. Chest:      Chest wall: No tenderness. Abdominal:      General: Bowel sounds are normal. There is no distension. Palpations: Abdomen is soft. Tenderness: There is no abdominal tenderness. There is no rebound. Musculoskeletal:         General: No tenderness. Normal range of motion. Cervical back: Normal range of motion and neck supple. Comments: RIGHT TOE: (+) mild edema and erythema over lateral surface   Skin:     General: Skin is warm and dry. Findings: No rash. Neurological:      Mental Status: He is alert and oriented to person, place, and time. Cranial Nerves: No cranial nerve deficit. Motor: No abnormal muscle tone. Coordination: Coordination normal.   Psychiatric:         Behavior: Behavior normal.         Thought Content:  Thought content normal.         Judgment: Judgment normal.        MDM         Procedures    Dx: infected toe    Disp: Clindamycin and percocet. F/U PCP in 2 to 3 days. Return to ER prn. Dictation disclaimer:  Please note that this dictation was completed with [employer], the computer voice recognition software. Quite often unanticipated grammatical, syntax, homophones, and other interpretive errors are inadvertently transcribed by the computer software. Please disregard these errors. Please excuse any errors that have escaped final proofreading.

## 2022-09-02 NOTE — Clinical Note
[address] [facility] EMERGENCY DEPT  1981 [address] [id number] [phone]    Work/School Note    Date: 9/1/2022    To Whom It May concern:    Rayne ShoreLeona was seen and treated today in the emergency room by the following provider(s):  Attending Provider: Hina Lepe MD.      Rayne Shore. is excused from work/school on 9/2/2022 through 9/4/2022. He is medically clear to return to work/school on 9/5/2022.          Sincerely,          Se Ly MD

## 2022-09-02 NOTE — ED NOTES
I have reviewed discharge instructions with the patient. The patient verbalized understanding. Patient armband removed and given to patient to take home. Patient was informed of the privacy risks if armband lost or stolen  Current Discharge Medication List        START taking these medications    Details   oxyCODONE-acetaminophen (Percocet) 5-325 mg per tablet Take 1 Tablet by mouth every six (6) hours as needed for Pain for up to 5 days. Max Daily Amount: 4 Tablets. Take 1 tablet every 6 hours as needed for pain control. If you were instructed to try over the counter ibuprofen or tylenol, only take the percocet for pain not controlled with the over the counter medication. Qty: 12 Tablet, Refills: 0  Start date: 9/2/2022, End date: 9/7/2022    Associated Diagnoses: Abscess of great toe of right foot; Obesity, morbid (HCC)      clindamycin (CLEOCIN) 300 mg capsule Take 1 Capsule by mouth four (4) times daily for 10 days.  Indications: an infection of the skin and the tissue below the skin  Qty: 40 Capsule, Refills: 0  Start date: 9/2/2022, End date: 9/12/2022          Patient accompanied by significant other

## 2022-09-07 RX ORDER — FLASH GLUCOSE SENSOR
KIT MISCELLANEOUS
Qty: 6 KIT | Refills: 3 | Status: SHIPPED | OUTPATIENT
Start: 2022-09-07

## 2022-09-07 NOTE — TELEPHONE ENCOUNTER
Per pharmacy, patient is requesting a refill on Freestyle Jarad 14 d/s sensors, not the Celaya 2 that was sent. Please sign if appropriate. Last Visit: 8/31/22 with MANDO Perez  Next Appointment: 10/5/22 with MANDO Perez  Previous Refill Encounter(s): 6/22/21 #2 with 11 refills    Requested Prescriptions     Pending Prescriptions Disp Refills    flash glucose sensor (FreeStyle Jarad 14 Day Sensor) kit 6 Kit 3     Sig: Apply new sensor every 14 days to monitor blood sugar as directed. For [address] in place:   Recommendation Provided To:    Intervention Detail: New Rx: 1, reason: Patient Preference  Gap Closed?:   Intervention Accepted By:   Time Spent (min): 5

## 2022-10-05 ENCOUNTER — VIRTUAL VISIT (OUTPATIENT)
Dept: FAMILY MEDICINE CLINIC | Age: 52
End: 2022-10-05
Payer: COMMERCIAL

## 2022-10-05 DIAGNOSIS — F41.9 MODERATE ANXIETY: ICD-10-CM

## 2022-10-05 DIAGNOSIS — F32.1 MODERATE MAJOR DEPRESSION (HCC): ICD-10-CM

## 2022-10-05 DIAGNOSIS — G47.33 OSA (OBSTRUCTIVE SLEEP APNEA): ICD-10-CM

## 2022-10-05 DIAGNOSIS — E29.1 HYPOGONADISM, MALE: ICD-10-CM

## 2022-10-05 DIAGNOSIS — E66.01 OBESITY, CLASS III, BMI 40-49.9 (MORBID OBESITY) (HCC): ICD-10-CM

## 2022-10-05 DIAGNOSIS — E11.65 TYPE 2 DIABETES MELLITUS WITH HYPERGLYCEMIA, UNSPECIFIED WHETHER LONG TERM INSULIN USE (HCC): Primary | ICD-10-CM

## 2022-10-05 PROCEDURE — 3046F HEMOGLOBIN A1C LEVEL >9.0%: CPT | Performed by: NURSE PRACTITIONER

## 2022-10-05 PROCEDURE — 99215 OFFICE O/P EST HI 40 MIN: CPT | Performed by: NURSE PRACTITIONER

## 2022-10-05 RX ORDER — PEN NEEDLE, DIABETIC 30 GX3/16"
NEEDLE, DISPOSABLE MISCELLANEOUS
Qty: 100 EACH | Refills: 2 | Status: SHIPPED | OUTPATIENT
Start: 2022-10-05 | End: 2022-10-17

## 2022-10-05 NOTE — PROGRESS NOTES
Myriam Rebolledo (: 1970) is a 47 y.o. male, established patient, here for evaluation of the following chief complaint(s):   Diabetes, Foot Pain (Patient is taking Tylenol with some relief), and Leg Pain       Myriam Rebolledo, was evaluated through a synchronous (real-time) audio-video encounter. The patient (or guardian if applicable) is aware that this is a billable service, which includes applicable co-pays. This Virtual Visit was conducted with patient's (and/or legal guardian's) consent. The visit was conducted pursuant to the emergency declaration under the [address] [place] [address], [address] [place] [address] authority and the [employer] General Act. Patient identification was verified, and a caregiver was present when appropriate. The patient was located at: Home: [place]  [id number]  The provider was located at: Facility (Appt Department): [address]  [address]        [address] [facility] was used to authenticate this note.   -- Nile Terrell

## 2022-10-05 NOTE — PROGRESS NOTES
Genie Severance. is a 47 y.o. male who was seen by synchronous (real-time) audio-video technology on 10/5/2022 for Diabetes, Foot Pain (Patient is taking Tylenol with some relief), and Leg Pain    Assessment & Plan:   Diagnoses and all orders for this visit:    1. Type 2 diabetes mellitus with hyperglycemia, unspecified whether long term insulin use ([address]    2. Moderate major depression ([address]    3. Moderate anxiety    4. GRAHAM (obstructive sleep apnea)  -     REFERRAL TO PULMONARY DISEASE    5. Hypogonadism, male  -     REFERRAL TO ENDOCRINOLOGY    6. Obesity, Class III, BMI 40-49.9 (morbid obesity) ([address]    Other orders  -     Insulin Needles, Disposable, (BD Ultra-Fine Short Pen Needle) 31 gauge x 5/16\" ndle; Inject 75 units by subcutaneous route every night. Reinforced taking medication the same time every day. Instructed to take his medication everyday once he gets off in the morning. Patient is agreeable with plan. Patient was able to verbally contract for safety today. Referred to medical weight loss provider to assist with weight loss. Over 50% of the 45 minutes face to face with Genie Severance. consisted of counseling and/or discussing treatment plans in reference to his DM/fatigue/anxiety/depression. Follow-up and Dispositions    Return for keep previous scheduled appt. Subjective:   Diabetic Review of Systems - medication compliance: compliant most of the time, diet compliance: noncompliant some of the time, home glucose monitoring: is performed regularly, with freestyle amanda, further ROS: no polyuria or polydipsia, no chest pain, dyspnea or TIA's  Patient states he doesn't take his medication at the same time every day. States he tries to take insulin after work at [employer] which is 3 times per week. Current glucose is 228-hasn't taken insulin today. Patient states he has been using insulin syringes to draw insulin out of his Basaglar KwikPen.   Comments he finds this easier than using a pen needles. Requesting Basaglar vials with insulin syringes. Patient states he just received a 3 month supply of Barbara Humptulips. Informed patient will send over Basaglar vials and insulin syringes(31g, length 6mm, 1mL/100 units, ultra fine insulin syringes) once he is due for a refill. Patient agreeable. Patient reports he is constantly fatigued. Reports he is unsure if this related to his work schedule or possibly other causes. Patient comments he hasn't been by endocrinology in approximately a year. Further reports [facility] states he does need a new CPAP mask. Anxiety/depression: states he has had these symptoms for as while. Reports he has never spoke to anyone regarding his depression. States symptoms have worsened over the last 3 months due to personal stressors.     3 most recent [facility] 36 Screens 10/5/2022 8/31/2022 6/23/2022   PHQ Not Done - Patient refuses -   Madi Trevino interest or pleasure in doing things Several days - Several days   Feeling down, depressed, irritable, or hopeless Several days - Several days   Total Score PHQ 2 2 - 2   Trouble falling or staying asleep, or sleeping too much Several days - Several days   Feeling tired or having little energy More than half the days - Several days   Poor appetite, weight loss, or overeating Not at all - Not at all   Feeling bad about yourself - or that you are a failure or have let yourself or your family down Nearly every day - Several days   Trouble concentrating on things such as school, work, reading, or watching TV More than half the days - Not at all   Moving or speaking so slowly that other people could have noticed; or the opposite being so fidgety that others notice Not at all - Not at all   Thoughts of being better off dead, or hurting yourself in some way Several days - Several days   PHQ 9 Score 11 - 6   How difficult have these problems made it for you to do your work, take care of your home and get along with others Somewhat difficult - Somewhat difficult     SEGUNDO 2/7 10/5/2022 6/23/2022   Feeling nervous, anxious or on edge? 2 -   Not being able to stop or control worrying? 2 -   SEGUNDO-2 Subtotal 4 -   Worrying too much about different things? 3 -   Trouble relaxing? 1 -   Being so restless that it is hard to sit still? 1 -   Becoming easily annoyed or irritable? 2 -   Feeling afraid as if something awful might happen? 3 -   SEGUNDO-7 Total Score 14 -   SEGUNDO-7 Result Moderate Anxiety -   If you checked off any problems, how difficult have these problems made it for you to do your work, take care of thinks at home, or get along with other people? Very difficult -   Feeling nervous, anxious, or on edge - 1   Not being able to stop or control worrying - 3   Worrying too much about different things - 3   Trouble relaxing - 2   Being so restless that it is hard to sit still - 0   Becoming easily annoyed or irritable - 2   Feeling afraid as if something awful might happen - 3   SEGUNDO-7 Total Score - 14     Depression Severity:   0-4 None, 5-9 mild, 10-14 moderate, 15-19 moderately severe, 20-27 severe. Anxiety Severity:   0-4 None/minimal, 5-9 mild, 10-14 moderate, 15-21 severe. 8/31/2022  Subjective:    Skye Schaefer. is a 47y.o. year old male seen for follow up of diabetes. He also has hypertension and hyperlipidemia. Diabetic Review of Systems - medication compliance: compliant all of the time, diabetic diet compliance: noncompliant some of the time, home glucose monitoring: is performed regularly with freestyle amanda 140-200s, mostly in the 240-260, further diabetic ROS: no polyuria or polydipsia, no chest pain, dyspnea or TIA's, no numbness, tingling or pain in extremities. Other symptoms and concerns: patient states he was informed by podiatrist Dr. Kristina Sampson he has arthritis in his right great toe. Comments pain has been getting worse.   Reports podiatrist is considering surgery. Reports pain begins in his toe and radiates upward. Has been taking tylenol with mild relief. Assessment/Plan:    ICD-10-CM ICD-9-CM    1. Type 2 diabetes mellitus with hyperglycemia, unspecified whether long term insulin use ([facility])  E11.65 250.00 HEMOGLOBIN A1C WITH EAG      LIPID PANEL      METABOLIC PANEL, COMPREHENSIVE      MICROALBUMIN, UR, RAND W/ MICROALB/CREAT RATIO       DIABETES FOOT EXAM      2. Essential hypertension  I10 401.9       3. Hypertriglyceridemia  E78.1 272.1       4. Hypovitaminosis D  E55.9 268.9 VITAMIN D, 25 HYDROXY      5. Right foot pain  M79.671 729.5 URIC ACID      6. Pain in right lower leg  M79.661 729.5 DUPLEX LOWER EXT VENOUS RIGHT        Orders Placed This Encounter    HEMOGLOBIN A1C WITH EAG    LIPID PANEL    METABOLIC PANEL, COMPREHENSIVE    MICROALBUMIN, UR, RAND W/ MICROALB/CREAT RATIO    URIC ACID    VITAMIN D, 25 HYDROXY    METABOLIC PANEL, COMPREHENSIVE    LIPID PANEL    MICROALBUMIN, UR, RAND W/ MICROALB/CREAT RATIO    HEMOGLOBIN A1C WITH EAG    VITAMIN D, 25 HYDROXY    URIC ACID    nystatin (MYCOSTATIN) topical cream    triamcinolone acetonide (KENALOG) 0.1 % topical cream    diclofenac (VOLTAREN) 1 % gel    flash glucose sensor (FreeStyle Jarad 2 Sensor) kit     needs improvement, needs to follow diet more regularly  Issues reviewed with him: low cholesterol diet, weight control and daily exercise discussed, all medications, side effects and compliance discussed carefully, and annual eye examinations at Ophthalmology discussed. Increase lantus to 75 units daily  Complete current supply of trulicity approx 8. Once complete will begin ozempic 1 mg weekly. I have discussed the diagnosis with the patient and the intended plan as seen in the above orders. The patient has received an after-visit summary and questions were answered concerning future plans. I have discussed medication side effects and warnings with the patient as well.   Patient agreeable with above plan and verbalizes understanding. Follow-up and Dispositions    Return in about 4 weeks (around 9/28/2022) for foot/leg pain, DM, virtual follow up. 5/4/2022  Subjective:     Mayank Kincaid is a 47y.o. year old male seen for follow up of diabetes. He also has hypertension and hyperlipidemia. Diabetic Review of Systems - medication compliance: compliant all of the time, taking Basaglar 60 units daily, diabetic diet compliance: states he does continue to eat smaller portions, home glucose monitoring: is performed regularly, 100-200 wearing amanda, further diabetic ROS: no polyuria or polydipsia, no chest pain, dyspnea or TIA's, no numbness, tingling or pain in extremities, last eye exam approximately Jan/Feb 2022. Other symptoms and concerns: patient states he has difficulty loosing weight. Inquiring if weight loss surgery would be a good idea for him. Patient states he started having upper abd cramping only with sneezing when his 'stomach tightens'. Reports today during exam when going from laying to sitting position he felt his stomach fluttering, denies actual cramping at this time. 2/3/2022 virtual visit  Subjective:   Prediabetic Review of Systems - medication compliance: compliant all of the time, diet compliance: patient states his portions are smaller, home glucose monitoring: is performed sporadically, further ROS: no polyuria or polydipsia, no chest pain, dyspnea or TIA's, no numbness, tingling or pain in extremities. Patient states he has not had a amanda sensor on in 3 weeks, reports the last time his checked his glucose it was 150-180. Patient states he was started on namenda by neurologist Dr. Genny Fraga. States he was sent to another provider for 'memory' testing. Assessment & Plan:   Diagnoses and all orders for this visit:     1.  Type 2 diabetes mellitus with hyperglycemia, unspecified whether long term insulin use ([address]     Reinforced importance of checking glucose as advised. Congratulated on decreasing portion sizes. Encouraged to adhere to ADA diet as previously advised. Follow-up and Dispositions    Return in about 3 months (around 5/3/2022) for DM/HTN/HLD, in office follow up, fasting labs 1 wk prior. 12/22/2021   Subjective:  Laine Ferrera is a 49y.o. year old male seen for follow up of diabetes. He also has hypertension and hyperlipidemia. Diabetic Review of Systems - medication compliance: compliant all of the time, states he has been taking 60-70 units, has been taking this range for awhile for the last several months, diabetic diet compliance: noncompliant some of the time, home glucose monitoring: is performed regularly, nonfasting values range 110-160/180, 200 after eating, further diabetic ROS: no polyuria or polydipsia, no chest pain, dyspnea or TIA's, no numbness, tingling or pain in extremities. Other symptoms and concerns: patient states since he began taking his medication after getting off of work in the morning between 3041-9000 and has noticed urinary urgency has improved. 9/22/2021  Diabetic Review of Systems - medication compliance: compliant most of the time, diabetic diet compliance: noncompliant some of the time, home glucose monitoring: is performed sporadically, fasting values range mid 100s, further diabetic ROS: no polyuria or polydipsia, no chest pain, dyspnea or TIA's, no numbness, tingling or pain in extremities, last eye exam 2/8/2021. Patient states he has been eating more sweets as of late. Other symptoms and concerns: patients he has been having urinary at night. Reports has not been taking flomax as prescribed. Subjective:  Laine Ferrera is a 49y.o. year old male seen for follow up of diabetes. He also has hypertension and hyperlipidemia.   Diabetic Review of Systems - medication compliance: compliant all of the time, states he has been taking 60-70 units, has been taking this range for awhile for the last several months, diabetic diet compliance: noncompliant some of the time, home glucose monitoring: is performed regularly, nonfasting values range 110-160/180, 200 after eating, further diabetic ROS: no polyuria or polydipsia, no chest pain, dyspnea or TIA's, no numbness, tingling or pain in extremities. Other symptoms and concerns: patient states since he began taking his medication after getting off of work in the morning between 1721-7991 and has noticed urinary urgency has improved. Prior to Admission medications    Medication Sig Start Date End Date Taking? Authorizing Provider   insulin glargine (Basaglar KwikPen U-100 Insulin) 100 unit/mL (3 mL) inpn 75 Units by SubCUTAneous route nightly for 90 days. 9/22/22 12/21/22 Yes Omar Campo NP   ergocalciferol (ERGOCALCIFEROL) 1,250 mcg (50,000 unit) capsule TAKE 1 CAPSULE BY MOUTH EVERY SEVEN (7) DAYS. 9/22/22  Yes Omar Campo NP   flash glucose sensor (FreeStyle Jarad 14 Day Sensor) kit Apply new sensor every 14 days to monitor blood sugar as directed. 9/7/22  Yes Omar Campo NP   nystatin (MYCOSTATIN) topical cream Apply  to affected area two (2) times a day. 8/31/22  Yes Omar Campo NP   lidocaine 4 % patch Apply to back and leave on for 12 hours 6/21/22  Yes Kodi Stanford,    lisinopril-hydroCHLOROthiazide (PRINZIDE, ZESTORETIC) 20-25 mg per tablet Take 1 Tablet by mouth daily. 5/4/22  Yes Omar Campo NP   empagliflozin (Jardiance) 25 mg tablet Take 1 Tablet by mouth daily. 5/4/22  Yes Omar Campo NP   dulaglutide (Trulicity) 3 BM/3.9 mL pnij 3 mg by SubCUTAneous route every seven (7) days. 5/4/22  Yes Omar Campo NP   sildenafil citrate (VIAGRA) 100 mg tablet Take 1 Tablet by mouth as needed (45 mins prior to intercourse).  5/4/22  Yes Omar Campo NP   rosuvastatin (CRESTOR) 20 mg tablet TAKE 1 TABLET BY MOUTH EVERY DAY AT NIGHT 4/13/22  Yes Omar Campo NP   Insulin Needles, Disposable, (BD Ultra-Fine Short Pen Needle) 31 gauge x 5/16\" ndle Inject 80 units by subcutaneous route every night. 6/22/20  Yes Gwen Zeng NP   multivitamin (ONE A DAY) tablet Take 1 Tab by mouth daily. Yes Provider, Historical   triamcinolone acetonide (KENALOG) 0.1 % topical cream Apply  to affected area two (2) times a day. use thin layer  Patient not taking: Reported on 10/5/2022 8/31/22   Naila PENDLETON NP   diclofenac (VOLTAREN) 1 % gel Apply  to affected area four (4) times daily as needed for Pain. Patient not taking: Reported on 10/5/2022 8/31/22   Naila PENDLETON NP   flash glucose sensor (FreeStyle [place] 2 Sensor) kit Monitor glucose as directed. Dispense 2 sensors  Patient not taking: Reported on 10/5/2022 8/31/22   Naila PENDLETON NP   cyclobenzaprine (FLEXERIL) 10 mg tablet Take 1 Tablet by mouth nightly as needed for Muscle Spasm(s). Patient not taking: No sig reported 6/23/22   Gwen Zeng NP   Blood Pressure Kit-Extra Large kit Take blood pressure as directed  Patient not taking: No sig reported 5/11/20   Gwen Zeng NP     Patient Active Problem List   Diagnosis Code    Obesity, morbid [facility]) E66.01     Patient Active Problem List    Diagnosis Date Noted    Obesity, morbid ([facility] 08/14/2018     Allergies   Allergen Reactions    Pcn [Penicillins] Other (comments)     Unknown was told when a child     Past Medical History:   Diagnosis Date    Diabetes ([address]     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  breast    Heart Disease Father     No Known Problems Sister     Cancer Brother     No Known Problems Sister     No Known Problems Brother     No Known Problems Brother     Diabetes Daughter     Diabetes Daughter      Social History     Tobacco Use    Smoking status: Some Days    Smokeless tobacco: Never   Substance Use Topics    Alcohol use: Yes     Comment: Occassional use       ROS    Objective:   No flowsheet data found. General: alert, cooperative, no distress   Mental  status: normal mood, behavior, speech, dress, motor activity, and thought processes, able to follow commands   HENT: NCAT   Neck: no visualized mass   Resp: no respiratory distress   Neuro: no gross deficits   Skin: no discoloration or lesions of concern on visible areas   Psychiatric: normal affect, consistent with stated mood, no evidence of hallucinations     Additional exam findings: We discussed the expected course, resolution and complications of the diagnosis(es) in detail. Medication risks, benefits, costs, interactions, and alternatives were discussed as indicated. I advised him to contact the office if his condition worsens, changes or fails to improve as anticipated. He expressed understanding with the diagnosis(es) and plan. Roger Lagunas, was evaluated through a synchronous (real-time) audio-video encounter. The patient (or guardian if applicable) is aware that this is a billable service, which includes applicable co-pays. This Virtual Visit was conducted with patient's (and/or legal guardian's) consent. The visit was conducted pursuant to the emergency declaration under the [address] [place] [address] authority and the [employer] and [employer] Act. Patient identification was verified, and a caregiver was present when appropriate. The patient was located at: Home: [place]  [phone]  The provider was located at:  Facility (Appt Department): [address] [place] [address] MANDO Velazquez

## 2022-10-18 ENCOUNTER — DOCUMENTATION ONLY (OUTPATIENT)
Dept: FAMILY MEDICINE CLINIC | Age: 52
End: 2022-10-18

## 2022-10-18 RX ORDER — SERTRALINE HYDROCHLORIDE 50 MG/1
50 TABLET, FILM COATED ORAL DAILY
Qty: 30 TABLET | Refills: 2 | Status: SHIPPED | OUTPATIENT
Start: 2022-10-18

## 2022-10-27 ENCOUNTER — TRANSCRIBE ORDER (OUTPATIENT)
Dept: REGISTRATION | Age: 52
End: 2022-10-27

## 2022-10-27 ENCOUNTER — HOSPITAL ENCOUNTER (OUTPATIENT)
Dept: GENERAL RADIOLOGY | Age: 52
Discharge: HOME | End: 2022-10-27
Payer: COMMERCIAL

## 2022-10-27 ENCOUNTER — HOSPITAL ENCOUNTER (OUTPATIENT)
Dept: PREADMISSION TESTING | Age: 52
Discharge: HOME | End: 2022-10-27
Payer: COMMERCIAL

## 2022-10-27 DIAGNOSIS — L97.509 ULCER OF FOOT DUE TO SECONDARY DIABETES MELLITUS (HCC): ICD-10-CM

## 2022-10-27 DIAGNOSIS — L97.509 ULCER OF FOOT DUE TO SECONDARY DIABETES MELLITUS (HCC): Primary | ICD-10-CM

## 2022-10-27 DIAGNOSIS — L97.522 ULCER OF LEFT FOOT, WITH FAT LAYER EXPOSED (HCC): ICD-10-CM

## 2022-10-27 DIAGNOSIS — M20.21 HALLUX RIGIDUS OF RIGHT FOOT: ICD-10-CM

## 2022-10-27 DIAGNOSIS — E13.621 ULCER OF FOOT DUE TO SECONDARY DIABETES MELLITUS (HCC): ICD-10-CM

## 2022-10-27 DIAGNOSIS — M14.671 CHARCOT'S JOINT OF ANKLE, RIGHT: ICD-10-CM

## 2022-10-27 DIAGNOSIS — E13.621 ULCER OF FOOT DUE TO SECONDARY DIABETES MELLITUS (HCC): Primary | ICD-10-CM

## 2022-10-27 LAB
ANION GAP SERPL CALC-SCNC: 5 MMOL/L (ref 3–18)
APTT PPP: 24 SEC (ref 23–36.4)
BASOPHILS # BLD: 0 K/UL (ref 0–0.1)
BASOPHILS NFR BLD: 0 % (ref 0–2)
BUN SERPL-MCNC: 29 MG/DL (ref 7–18)
BUN/CREAT SERPL: 17 (ref 12–20)
CALCIUM SERPL-MCNC: 9.3 MG/DL (ref 8.5–10.1)
CHLORIDE SERPL-SCNC: 100 MMOL/L (ref 100–111)
CO2 SERPL-SCNC: 30 MMOL/L (ref 21–32)
CREAT SERPL-MCNC: 1.71 MG/DL (ref 0.6–1.3)
DIFFERENTIAL METHOD BLD: NORMAL
EOSINOPHIL # BLD: 0.1 K/UL (ref 0–0.4)
EOSINOPHIL NFR BLD: 2 % (ref 0–5)
ERYTHROCYTE [DISTWIDTH] IN BLOOD BY AUTOMATED COUNT: 13.1 % (ref 11.6–14.5)
GLUCOSE SERPL-MCNC: 213 MG/DL (ref 74–99)
HCT VFR BLD AUTO: 46 % (ref 36–48)
HGB BLD-MCNC: 14.7 G/DL (ref 13–16)
IMM GRANULOCYTES # BLD AUTO: 0 K/UL (ref 0–0.04)
IMM GRANULOCYTES NFR BLD AUTO: 0 % (ref 0–0.5)
INR PPP: 0.9 (ref 0.8–1.2)
LYMPHOCYTES # BLD: 1.8 K/UL (ref 0.9–3.6)
LYMPHOCYTES NFR BLD: 36 % (ref 21–52)
MCH RBC QN AUTO: 27.3 PG (ref 24–34)
MCHC RBC AUTO-ENTMCNC: 32 G/DL (ref 31–37)
MCV RBC AUTO: 85.3 FL (ref 78–100)
MONOCYTES # BLD: 0.4 K/UL (ref 0.05–1.2)
MONOCYTES NFR BLD: 9 % (ref 3–10)
NEUTS SEG # BLD: 2.6 K/UL (ref 1.8–8)
NEUTS SEG NFR BLD: 53 % (ref 40–73)
NRBC # BLD: 0 K/UL (ref 0–0.01)
NRBC BLD-RTO: 0 PER 100 WBC
PLATELET # BLD AUTO: 228 K/UL (ref 135–420)
PMV BLD AUTO: 10.5 FL (ref 9.2–11.8)
POTASSIUM SERPL-SCNC: 4.6 MMOL/L (ref 3.5–5.5)
PROTHROMBIN TIME: 12.8 SEC (ref 11.5–15.2)
RBC # BLD AUTO: 5.39 M/UL (ref 4.35–5.65)
SODIUM SERPL-SCNC: 135 MMOL/L (ref 136–145)
WBC # BLD AUTO: 5 K/UL (ref 4.6–13.2)

## 2022-10-27 PROCEDURE — 93005 ELECTROCARDIOGRAM TRACING: CPT

## 2022-10-27 PROCEDURE — 85610 PROTHROMBIN TIME: CPT

## 2022-10-27 PROCEDURE — 85025 COMPLETE CBC W/AUTO DIFF WBC: CPT

## 2022-10-27 PROCEDURE — 85730 THROMBOPLASTIN TIME PARTIAL: CPT

## 2022-10-27 PROCEDURE — 71046 X-RAY EXAM CHEST 2 VIEWS: CPT

## 2022-10-27 PROCEDURE — 36415 COLL VENOUS BLD VENIPUNCTURE: CPT

## 2022-10-27 PROCEDURE — 80048 BASIC METABOLIC PNL TOTAL CA: CPT

## 2022-10-28 LAB
ATRIAL RATE: 85 BPM
CALCULATED P AXIS, ECG09: 59 DEGREES
CALCULATED R AXIS, ECG10: 48 DEGREES
CALCULATED T AXIS, ECG11: 27 DEGREES
DIAGNOSIS, 93000: NORMAL
P-R INTERVAL, ECG05: 164 MS
Q-T INTERVAL, ECG07: 358 MS
QRS DURATION, ECG06: 72 MS
QTC CALCULATION (BEZET), ECG08: 426 MS
VENTRICULAR RATE, ECG03: 85 BPM

## 2022-11-04 RX ORDER — SERTRALINE HYDROCHLORIDE 50 MG/1
50 TABLET, FILM COATED ORAL DAILY
Qty: 30 TABLET | Refills: 2 | OUTPATIENT
Start: 2022-11-04

## 2022-11-10 ENCOUNTER — VIRTUAL VISIT (OUTPATIENT)
Dept: FAMILY MEDICINE CLINIC | Age: 52
End: 2022-11-10
Payer: COMMERCIAL

## 2022-11-10 DIAGNOSIS — F41.9 MILD ANXIETY: Primary | ICD-10-CM

## 2022-11-10 DIAGNOSIS — Z86.59 HISTORY OF DEPRESSION: ICD-10-CM

## 2022-11-10 PROCEDURE — 99212 OFFICE O/P EST SF 10 MIN: CPT | Performed by: NURSE PRACTITIONER

## 2022-11-10 NOTE — PROGRESS NOTES
Radha Tamez. is a 47 y.o. male who was seen by synchronous (real-time) audio-video technology on 11/10/2022 for Anxiety, Depression, and Foot Swelling (Right X's 3 day Patient is having foot surgery 11/28/22)    Assessment & Plan:   Diagnoses and all orders for this visit:    1. Mild anxiety    2. History of depression     Above stable at this time  Follow-up and Dispositions    Return for keep previous scheduled appt. 712  Subjective:   Patient has been seen by psych NP Francis Arndt and was referred to a counselor. States he was scheduled for initial appt however, comments he is going to reschedule due to foot pain he is experiencing. Glucose has been running between 140-240. Patient reports he is currently experiencing right foot pain/swelling. Reports he going to reach out to his podiatrist today. 3 most recent PHQ Screens 11/10/2022   PHQ Not Done -   Little interest or pleasure in doing things Not at all   Feeling down, depressed, irritable, or hopeless Not at all   Total Score PHQ 2 0   Trouble falling or staying asleep, or sleeping too much Not at all   Feeling tired or having little energy Several days   Poor appetite, weight loss, or overeating Not at all   Feeling bad about yourself - or that you are a failure or have let yourself or your family down Not at all   Trouble concentrating on things such as school, work, reading, or watching TV Not at all   Moving or speaking so slowly that other people could have noticed; or the opposite being so fidgety that others notice Not at all   Thoughts of being better off dead, or hurting yourself in some way Not at all   PHQ 9 Score 1   How difficult have these problems made it for you to do your work, take care of your home and get along with others Not difficult at all     SEGUNDO 2/7 11/10/2022 10/5/2022 6/23/2022   Feeling nervous, anxious or on edge? 0 2 -   Not being able to stop or control worrying?  1 2 -   SEGUNDO-2 Subtotal 1 4 -   Worrying too much about different things? 3 3 -   Trouble relaxing? 1 1 -   Being so restless that it is hard to sit still? 0 1 -   Becoming easily annoyed or irritable? 0 2 -   Feeling afraid as if something awful might happen? 1 3 -   SEGUNDO-7 Total Score 6 14 -   SEGUNDO-7 Result - Moderate Anxiety -   If you checked off any problems, how difficult have these problems made it for you to do your work, take care of thinks at home, or get along with other people? Not at all difficult Very difficult -   Feeling nervous, anxious, or on edge - - 1   Not being able to stop or control worrying - - 3   Worrying too much about different things - - 3   Trouble relaxing - - 2   Being so restless that it is hard to sit still - - 0   Becoming easily annoyed or irritable - - 2   Feeling afraid as if something awful might happen - - 3   SEGUNDO-7 Total Score - - 14     10/5/2022 virtual visit  Raquel Deleon is a 47 y.o. male who was seen by synchronous (real-time) audio-video technology on 10/5/2022 for Diabetes, Foot Pain (Patient is taking Tylenol with some relief), and Leg Pain    Assessment & Plan:   Diagnoses and all orders for this visit:    1. Type 2 diabetes mellitus with hyperglycemia, unspecified whether long term insulin use ([address]    2. Moderate major depression ([address]    3. Moderate anxiety    4. GRAHAM (obstructive sleep apnea)  -     REFERRAL TO PULMONARY DISEASE    5. Hypogonadism, male  -     REFERRAL TO ENDOCRINOLOGY    6. Obesity, Class III, BMI 40-49.9 (morbid obesity) ([address]    Other orders  -     Insulin Needles, Disposable, (BD Ultra-Fine Short Pen Needle) 31 gauge x 5/16\" ndle; Inject 75 units by subcutaneous route every night. Reinforced taking medication the same time every day. Instructed to take his medication everyday once he gets off in the morning. Patient is agreeable with plan. Patient was able to verbally contract for safety today. Referred to medical weight loss provider to assist with weight loss.     Over 50% of the 45 minutes face to face with Barb Chen. consisted of counseling and/or discussing treatment plans in reference to his DM/fatigue/anxiety/depression. Follow-up and Dispositions    Return for keep previous scheduled appt. Subjective:   Diabetic Review of Systems - medication compliance: compliant most of the time, diet compliance: noncompliant some of the time, home glucose monitoring: is performed regularly, with freestyle amanda, further ROS: no polyuria or polydipsia, no chest pain, dyspnea or TIA's  Patient states he doesn't take his medication at the same time every day. States he tries to take insulin after work at [employer] which is 3 times per week. Current glucose is 228-hasn't taken insulin today. Patient states he has been using insulin syringes to draw insulin out of his Basaglar KwikPen. Comments he finds this easier than using a pen needles. Requesting Basaglar vials with insulin syringes. Patient states he just received a 3 month supply of [employer] Daubs. Informed patient will send over Basaglar vials and insulin syringes(31g, length 6mm, 1mL/100 units, ultra fine insulin syringes) once he is due for a refill. Patient agreeable. Patient reports he is constantly fatigued. Reports he is unsure if this related to his work schedule or possibly other causes. Patient comments he hasn't been by endocrinology in approximately a year. Further reports he hasn't been seen for his sleep apnea in 'awhile', states he does need a new CPAP mask. Anxiety/depression: states he has had these symptoms for as while. Reports he has never spoke to anyone regarding his depression. States symptoms have worsened over the last 3 months due to personal stressors. Prior to Admission medications    Medication Sig Start Date End Date Taking? Authorizing Provider   insulin glargine (Basaglar KwikPen U-100 Insulin) 100 unit/mL (3 mL) inpn 75 Units by SubCUTAneous route nightly for 90 days. 9/22/22 12/21/22 Yes Jermain Pan NP   ergocalciferol (ERGOCALCIFEROL) 1,250 mcg (50,000 unit) capsule TAKE 1 CAPSULE BY MOUTH EVERY SEVEN (7) DAYS. 9/22/22  Yes Jermain Pan NP   flash glucose sensor (FreeStyle Jarad 14 Day Sensor) kit Apply new sensor every 14 days to monitor blood sugar as directed. 9/7/22  Yes Jermain Pan NP   nystatin (MYCOSTATIN) topical cream Apply  to affected area two (2) times a day. 8/31/22  Yes Jermain Pan NP   flash glucose sensor (FreeStyle Jarad 2 Sensor) kit Monitor glucose as directed. Dispense 2 sensors  Patient taking differently: Monitor glucose as directed. Dispense 2 sensors 8/31/22  Yes Jermain Pan NP   lidocaine 4 % patch Apply to back and leave on for 12 hours 6/21/22  Yes Jessica Stanford,    lisinopril-hydroCHLOROthiazide (PRINZIDE, ZESTORETIC) 20-25 mg per tablet Take 1 Tablet by mouth daily. 5/4/22  Yes Jermain Pan NP   empagliflozin (Jardiance) 25 mg tablet Take 1 Tablet by mouth daily. 5/4/22  Yes Jermain Pan NP   dulaglutide (Trulicity) 3 EX/1.3 mL pnij 3 mg by SubCUTAneous route every seven (7) days. 5/4/22  Yes Jermain Pan NP   sildenafil citrate (VIAGRA) 100 mg tablet Take 1 Tablet by mouth as needed (45 mins prior to intercourse). 5/4/22  Yes Jermain Pan NP   rosuvastatin (CRESTOR) 20 mg tablet TAKE 1 TABLET BY MOUTH EVERY DAY AT NIGHT 4/13/22  Yes Jermain Pan NP   multivitamin (ONE A DAY) tablet Take 1 Tab by mouth daily. Yes Provider, Historical   sertraline (ZOLOFT) 50 mg tablet Take 1 Tablet by mouth daily. Patient not taking: Reported on 11/10/2022 10/18/22   Sonia PENDLETON NP   triamcinolone acetonide (KENALOG) 0.1 % topical cream Apply  to affected area two (2) times a day. use thin layer  Patient not taking: No sig reported 8/31/22   Sonia Soho T, NP   diclofenac (VOLTAREN) 1 % gel Apply  to affected area four (4) times daily as needed for Pain.   Patient not taking: No sig reported 8/31/22   Jermain Pan NP cyclobenzaprine (FLEXERIL) 10 mg tablet Take 1 Tablet by mouth nightly as needed for Muscle Spasm(s). Patient not taking: No sig reported 6/23/22   Hortencia Parker NP   Blood Pressure Kit-Extra Large kit Take blood pressure as directed  Patient not taking: No sig reported 5/11/20   Hortencia Parker NP     Patient Active Problem List   Diagnosis Code    Obesity, morbid [facility]) E66.01     Patient Active Problem List    Diagnosis Date Noted    Obesity, morbid ([facility] 08/14/2018     Current Outpatient Medications   Medication Sig Dispense Refill    insulin glargine (Basaglar KwikPen U-100 Insulin) 100 unit/mL (3 mL) inpn 75 Units by SubCUTAneous route nightly for 90 days. 67.5 mL 2    ergocalciferol (ERGOCALCIFEROL) 1,250 mcg (50,000 unit) capsule TAKE 1 CAPSULE BY MOUTH EVERY SEVEN (7) DAYS. 12 Capsule 1    flash glucose sensor (FreeStyle Jarad 14 Day Sensor) kit Apply new sensor every 14 days to monitor blood sugar as directed. 6 Kit 3    nystatin (MYCOSTATIN) topical cream Apply  to affected area two (2) times a day. 30 g 1    flash glucose sensor (FreeStyle Jarad 2 Sensor) kit Monitor glucose as directed. Dispense 2 sensors (Patient taking differently: Monitor glucose as directed. Dispense 2 sensors) 2 Kit 11    lidocaine 4 % patch Apply to back and leave on for 12 hours 30 Each 0    lisinopril-hydroCHLOROthiazide (PRINZIDE, ZESTORETIC) 20-25 mg per tablet Take 1 Tablet by mouth daily. 90 Tablet 2    empagliflozin (Jardiance) 25 mg tablet Take 1 Tablet by mouth daily. 90 Tablet 2    dulaglutide (Trulicity) 3 QQ/7.0 mL pnij 3 mg by SubCUTAneous route every seven (7) days. 12 Each 3    sildenafil citrate (VIAGRA) 100 mg tablet Take 1 Tablet by mouth as needed (45 mins prior to intercourse). 10 Tablet 1    rosuvastatin (CRESTOR) 20 mg tablet TAKE 1 TABLET BY MOUTH EVERY DAY AT NIGHT 90 Tablet 1    multivitamin (ONE A DAY) tablet Take 1 Tab by mouth daily.       sertraline (ZOLOFT) 50 mg tablet Take 1 Tablet by mouth daily. (Patient not taking: Reported on 11/10/2022) 30 Tablet 2    triamcinolone acetonide (KENALOG) 0.1 % topical cream Apply  to affected area two (2) times a day. use thin layer (Patient not taking: No sig reported) 30 g 1    diclofenac (VOLTAREN) 1 % gel Apply  to affected area four (4) times daily as needed for Pain. (Patient not taking: No sig reported) 100 g 1    cyclobenzaprine (FLEXERIL) 10 mg tablet Take 1 Tablet by mouth nightly as needed for Muscle Spasm(s). (Patient not taking: No sig reported) 30 Tablet 1    Blood Pressure Kit-Extra Large kit Take blood pressure as directed (Patient not taking: No sig reported) 1 Each 0     Allergies   Allergen Reactions    Pcn [Penicillins] Other (comments)     Unknown was told when a child     Past Medical History:   Diagnosis Date    Diabetes ([facility]     Diabetic gangrene ([facility]     Hypercholesterolemia     Hypertension     Intracranial hemorrhage ([facility]      Past Surgical History:   Procedure Laterality Date    HX APPENDECTOMY       Family History   Problem Relation Age of Onset    Cancer Mother         unknown ? breast    Heart Disease Father     No Known Problems Sister     Cancer Brother     No Known Problems Sister     No Known Problems Brother     No Known Problems Brother     Diabetes Daughter     Diabetes Daughter      Social History     Tobacco Use    Smoking status: Some Days    Smokeless tobacco: Never   Substance Use Topics    Alcohol use: Yes     Comment: Occassional use       ROS    Objective:   No flowsheet data found.    General: alert, cooperative, no distress   Mental  status: normal mood, behavior, speech, dress, motor activity, and thought processes, able to follow commands   HENT: NCAT   Neck: no visualized mass   Resp: no respiratory distress   Neuro: no gross deficits   Skin: no discoloration or lesions of concern on visible areas   Psychiatric: normal affect, consistent with stated mood, no evidence of hallucinations     Additional exam findings: We discussed the expected course, resolution and complications of the diagnosis(es) in detail. Medication risks, benefits, costs, interactions, and alternatives were discussed as indicated. I advised him to contact the office if his condition worsens, changes or fails to improve as anticipated. He expressed understanding with the diagnosis(es) and plan. Myriam Martinez., was evaluated through a synchronous (real-time) audio-video encounter. The patient (or guardian if applicable) is aware that this is a billable service, which includes applicable co-pays. This Virtual Visit was conducted with patient's (and/or legal guardian's) consent. The visit was conducted pursuant to the emergency declaration under the [address] [place] [address] authority and the [employer] Act. Patient identification was verified, and a caregiver was present when appropriate. The patient was located at: Home: [place] [id number]  The provider was located at:  Facility (Appt Department): [address] [place] [address] MANDO Velazquez

## 2022-11-10 NOTE — PROGRESS NOTES
Ignacio Castañeda (: 1970) is a 47 y.o. male, established patient, here for evaluation of the following chief complaint(s): Anxiety and Depression           Ignacio Castañeda, was evaluated through a synchronous (real-time) audio-video encounter. The patient (or guardian if applicable) is aware that this is a billable service, which includes applicable co-pays. This Virtual Visit was conducted with patient's (and/or legal guardian's) consent. The visit was conducted pursuant to the emergency declaration under the [address] [place] [address], [address] [place] [address] authority and the [employer] and [employer] Act. Patient identification was verified, and a caregiver was present when appropriate. The patient was located at: Home: [place]  [phone]  The provider was located at: Facility (Appt Department): [address] [place] [address] [address]  [address]       An [address] [facility] was used to authenticate this note.   -- Taye Molina

## 2022-11-16 ENCOUNTER — OFFICE VISIT (OUTPATIENT)
Dept: FAMILY MEDICINE CLINIC | Age: 52
End: 2022-11-16
Payer: COMMERCIAL

## 2022-11-16 VITALS
HEART RATE: 91 BPM | TEMPERATURE: 97.5 F | DIASTOLIC BLOOD PRESSURE: 52 MMHG | SYSTOLIC BLOOD PRESSURE: 90 MMHG | OXYGEN SATURATION: 98 % | HEIGHT: 70 IN | RESPIRATION RATE: 20 BRPM | BODY MASS INDEX: 43.38 KG/M2 | WEIGHT: 303 LBS

## 2022-11-16 DIAGNOSIS — E55.9 HYPOVITAMINOSIS D: ICD-10-CM

## 2022-11-16 DIAGNOSIS — I10 ESSENTIAL HYPERTENSION: ICD-10-CM

## 2022-11-16 DIAGNOSIS — E78.1 HYPERTRIGLYCERIDEMIA: ICD-10-CM

## 2022-11-16 DIAGNOSIS — Z01.818 PREOPERATIVE GENERAL PHYSICAL EXAMINATION: Primary | ICD-10-CM

## 2022-11-16 DIAGNOSIS — M21.611 BUNION OF RIGHT FOOT: ICD-10-CM

## 2022-11-16 DIAGNOSIS — E11.65 TYPE 2 DIABETES MELLITUS WITH HYPERGLYCEMIA, UNSPECIFIED WHETHER LONG TERM INSULIN USE (HCC): ICD-10-CM

## 2022-11-16 DIAGNOSIS — E66.01 OBESITY, CLASS III, BMI 40-49.9 (MORBID OBESITY) (HCC): ICD-10-CM

## 2022-11-16 PROCEDURE — 3046F HEMOGLOBIN A1C LEVEL >9.0%: CPT | Performed by: NURSE PRACTITIONER

## 2022-11-16 PROCEDURE — 3078F DIAST BP <80 MM HG: CPT | Performed by: NURSE PRACTITIONER

## 2022-11-16 PROCEDURE — 3074F SYST BP LT 130 MM HG: CPT | Performed by: NURSE PRACTITIONER

## 2022-11-16 PROCEDURE — 99214 OFFICE O/P EST MOD 30 MIN: CPT | Performed by: NURSE PRACTITIONER

## 2022-11-16 NOTE — PROGRESS NOTES
Preoperative Evaluation    Date of Exam: 2022    Skye Brown is a 47 y.o. male (:1970) who presents for preoperative evaluation. Procedure/Surgery:Partial removal bunion joint, cutting and relocation with metal pin screw, removal bone great toe, flap closure ulcer all right foot   Date of Procedure/Surgery: 22  Surgeon: [facility] Facility:  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 ergocalciferol (ERGOCALCIFEROL) 1,250 mcg (50,000 unit) capsule TAKE 1 CAPSULE BY MOUTH EVERY SEVEN (7) DAYS. flash glucose sensor ([employer] Jarad 2 Sensor) kit Monitor glucose as directed. Dispense 2 sensors (Patient taking differently: Monitor glucose as directed. Dispense 2 sensors)    lidocaine 4 % patch Apply to back and leave on for 12 hours    lisinopril-hydroCHLOROthiazide (PRINZIDE, ZESTORETIC) 20-25 mg per tablet Take 1 Tablet by mouth daily. empagliflozin (Jardiance) 25 mg tablet Take 1 Tablet by mouth daily. dulaglutide (Trulicity) 3 QX/7.9 mL pnij 3 mg by SubCUTAneous route every seven (7) days. sildenafil citrate (VIAGRA) 100 mg tablet Take 1 Tablet by mouth as needed (45 mins prior to intercourse).     rosuvastatin (CRESTOR) 20 mg tablet TAKE 1 TABLET BY MOUTH EVERY DAY AT NIGHT    multivitamin (ONE A DAY) tablet Take 1 Tab by mouth daily. tamsulosin (FLOMAX) 0.4 mg capsule TAKE 1 CAPSULE BY MOUTH EVERY DAY (Patient not taking: Reported on 11/16/2022)    flash glucose sensor (FreeStyle Jarad 14 Day Sensor) kit Apply new sensor every 14 days to monitor blood sugar as directed. (Patient not taking: Reported on 11/16/2022)    nystatin (MYCOSTATIN) topical cream Apply  to affected area two (2) times a day. (Patient not taking: Reported on 11/16/2022)     No current facility-administered medications for this visit. Surgical History:     Past Surgical History:   Procedure Laterality Date    HX APPENDECTOMY       Social History:     Social History     Socioeconomic History    Marital status:    Tobacco Use    Smoking status: Some Days    Smokeless tobacco: Never   Vaping Use    Vaping Use: Never used   Substance and Sexual Activity    Alcohol use: Yes     Comment: Occassional use    Drug use: Yes     Types: Marijuana     Comment: 2-3 times per month    Sexual activity: Yes     Partners: Female     Birth control/protection: None     Social Determinants of Health     Financial Resource Strain: Medium Risk    Difficulty of Paying Living Expenses: Somewhat hard   Food Insecurity: No Food Insecurity    Worried About Running Out of Food in the Last Year: Never true    Ran Out of Food in the Last Year: Never true       Recent use of: Naproxen, taking BID, instructed to stop medication on 11/22/2022    Tetanus up to date: last tetanus booster 11/19/2015    Anesthesia Complications: None  History of abnormal bleeding : None  History of Blood Transfusions: no  Health Care Directive or Living Will: no    REVIEW OF SYSTEMS:  Review of Systems   Constitutional:  Negative for chills and fever. Respiratory:  Negative for shortness of breath. Cardiovascular:  Negative for chest pain and palpitations. Neurological:  Negative for dizziness and headaches.      EXAM:   Visit Vitals  BP (!) 90/52 (BP 1 Location: Left upper arm, BP Patient Position: Sitting, BP Cuff Size: Large adult)   Pulse 91   Temp 97.5 °F (36.4 °C) (Temporal)   Resp 20   Ht 5' 10\" (1.778 m)   Wt 303 lb (137.4 kg)   SpO2 98%   BMI 43.48 kg/m²     Physical Exam  Constitutional:       General: He is not in acute distress. Appearance: He is well-developed. He is not diaphoretic. HENT:      Head: Normocephalic and atraumatic. Neck:      Vascular: No carotid bruit. Cardiovascular:      Rate and Rhythm: Normal rate and regular rhythm. Heart sounds: Normal heart sounds. No murmur heard. No friction rub. No gallop. Pulmonary:      Effort: Pulmonary effort is normal.      Breath sounds: Normal breath sounds. No stridor. No wheezing or rales. Abdominal:      General: Bowel sounds are normal.      Palpations: Abdomen is soft. Tenderness: There is no abdominal tenderness. Musculoskeletal:      Cervical back: Normal range of motion and neck supple. Lymphadenopathy:      Cervical: No cervical adenopathy. Skin:     General: Skin is warm and dry. Neurological:      Mental Status: He is alert and oriented to person, place, and time.            DIAGNOSTICS:   Hospital Outpatient Visit on 10/27/2022   Component Date Value Ref Range Status    WBC 10/27/2022 5.0  4.6 - 13.2 K/uL Final    RBC 10/27/2022 5.39  4.35 - 5.65 M/uL Final    HGB 10/27/2022 14.7  13.0 - 16.0 g/dL Final    HCT 10/27/2022 46.0  36.0 - 48.0 % Final    MCV 10/27/2022 85.3  78.0 - 100.0 FL Final    MCH 10/27/2022 27.3  24.0 - 34.0 PG Final    MCHC 10/27/2022 32.0  31.0 - 37.0 g/dL Final    RDW 10/27/2022 13.1  11.6 - 14.5 % Final    PLATELET 81/64/9390 257  135 - 420 K/uL Final    MPV 10/27/2022 10.5  9.2 - 11.8 FL Final    NRBC 10/27/2022 0.0  0  WBC Final    ABSOLUTE NRBC 10/27/2022 0.00  0.00 - 0.01 K/uL Final    NEUTROPHILS 10/27/2022 53  40 - 73 % Final    LYMPHOCYTES 10/27/2022 36  21 - 52 % Final    MONOCYTES 10/27/2022 9  3 - 10 % Final    EOSINOPHILS 10/27/2022 2  0 - 5 % Final    BASOPHILS 10/27/2022 0  0 - 2 % Final    IMMATURE GRANULOCYTES 10/27/2022 0  0.0 - 0.5 % Final    ABS. NEUTROPHILS 10/27/2022 2.6  1.8 - 8.0 K/UL Final    ABS. LYMPHOCYTES 10/27/2022 1.8  0.9 - 3.6 K/UL Final    ABS. MONOCYTES 10/27/2022 0.4  0.05 - 1.2 K/UL Final    ABS. EOSINOPHILS 10/27/2022 0.1  0.0 - 0.4 K/UL Final    ABS. BASOPHILS 10/27/2022 0.0  0.0 - 0.1 K/UL Final    ABS. IMM. GRANS. 10/27/2022 0.0  0.00 - 0.04 K/UL Final    DF 10/27/2022 AUTOMATED    Final    Sodium 10/27/2022 135 (A)  136 - 145 mmol/L Final    Potassium 10/27/2022 4.6  3.5 - 5.5 mmol/L Final    Chloride 10/27/2022 100  100 - 111 mmol/L Final    CO2 10/27/2022 30  21 - 32 mmol/L Final    Anion gap 10/27/2022 5  3.0 - 18 mmol/L Final    Glucose 10/27/2022 213 (A)  74 - 99 mg/dL Final    BUN 10/27/2022 29 (A)  7.0 - 18 MG/DL Final    Creatinine 10/27/2022 1.71 (A)  0.6 - 1.3 MG/DL Final    BUN/Creatinine ratio 10/27/2022 17  12 - 20   Final    eGFR 10/27/2022 48 (A)  >60 ml/min/1.73m2 Final    Comment:      Pediatric calculator link: [facility].at. org/professionals/kdoqi/gfr_calculatorped       Effective Oct 3, 2022       These results are not intended for use in patients <25years of age. eGFR results are calculated without a race factor using  the 2021 CKD-EPI equation. Careful clinical correlation is recommended, particularly when comparing to results calculated using previous equations. The CKD-EPI equation is less accurate in patients with extremes of muscle mass, extra-renal metabolism of creatinine, excessive creatine ingestion, or following therapy that affects renal tubular secretion.       Calcium 10/27/2022 9.3  8.5 - 10.1 MG/DL Final    aPTT 10/27/2022 24.0  23.0 - 36.4 SEC Final    Prothrombin time 10/27/2022 12.8  11.5 - 15.2 sec Final    INR 10/27/2022 0.9  0.8 - 1.2   Final    Comment:            INR Therapeutic Ranges         (on stable oral anticoagulant):     INDICATION INR  DVT/PE/Atrial Fib          2.0-3.0  MI/Mechanical Heart Valve  2.5-3.5      Ventricular Rate 10/27/2022 85  BPM Final    Atrial Rate 10/27/2022 85  BPM Final    P-R Interval 10/27/2022 164  ms Final    QRS Duration 10/27/2022 72  ms Final    Q-T Interval 10/27/2022 358  ms Final    QTC Calculation (Bezet) 10/27/2022 426  ms Final    Calculated P Axis 10/27/2022 59  degrees Final    Calculated R Axis 10/27/2022 48  degrees Final    Calculated T Axis 10/27/2022 27  degrees Final    Diagnosis 10/27/2022    Final                    Value:Normal sinus rhythm  Low voltage QRS  Cannot rule out Anterior infarct , age undetermined  Abnormal ECG  When compared with ECG of 11-DEC-2013 15:55,  Minimal criteria for Anterior infarct are now present  Confirmed by Natalie Ngo MD, ----- ([id number]) on 10/28/2022 3:49:18 PM     XR Results (most recent):  Results from [place] encounter on 10/27/22    XR CHEST PA LAT    Narrative  EXAM: XR CHEST PA LAT    CLINICAL INDICATION/HISTORY : .    COMPARISON: None    TECHNIQUE: 2 VIEWS    FINDINGS:    Hypoinflation without focal lung consolidation. The heart and mediastinum are unremarkable. No evidence of pleural effusion. Bones and soft tissues appear unremarkable    Impression  1. No active cardiopulmonary disease      IMPRESSION:     ICD-10-CM ICD-9-CM    1. Preoperative general physical examination  Z01.818 V72.83 EKG, 12 LEAD, INITIAL      2. Type 2 diabetes mellitus with hyperglycemia, unspecified whether long term insulin use ([facility])  E11.65 250.00 MICROALBUMIN, UR, RAND W/ MICROALB/CREAT RATIO      3. Obesity, Class III, BMI 40-49.9 (morbid obesity) ([facility])  E66.01 278.01       4. Essential hypertension  I10 401.9       5. Hypertriglyceridemia  E78.1 272.1       6. Hypovitaminosis D  E55.9 268.9       7.  Bunion of right foot  M21.611 727.1         No contraindications to planned surgery, if repeat EKG is negative        Julio Dos Santos NP   11/16/2022        Ramirez Calvin Becky Jimenez MD

## 2022-11-16 NOTE — PROGRESS NOTES
1. \"Have you been to the ER, urgent care clinic since your last visit? Hospitalized since your last visit? \" No    2. \"Have you seen or consulted any other health care providers outside of the [address] [place] [address] since your last visit? \" No     3. For patients aged 39-70: Has the patient had a colonoscopy / FIT/ Cologuard?  No

## 2022-11-17 ENCOUNTER — HOSPITAL ENCOUNTER (OUTPATIENT)
Dept: LAB | Age: 52
Discharge: HOME | End: 2022-11-17
Payer: COMMERCIAL

## 2022-11-17 LAB
CREAT UR-MCNC: 142 MG/DL (ref 30–125)
MICROALBUMIN UR-MCNC: 2.14 MG/DL (ref 0–3)
MICROALBUMIN/CREAT UR-RTO: 15 MG/G (ref 0–30)

## 2022-11-17 PROCEDURE — 82043 UR ALBUMIN QUANTITATIVE: CPT

## 2022-11-18 RX ORDER — CLINDAMYCIN HYDROCHLORIDE 300 MG/1
300 CAPSULE ORAL
COMMUNITY
Start: 2022-11-11

## 2022-11-18 RX ORDER — CHOLECALCIFEROL (VITAMIN D3) 125 MCG
100 CAPSULE ORAL DAILY
COMMUNITY

## 2022-11-18 RX ORDER — NAPROXEN 500 MG/1
500 TABLET ORAL
COMMUNITY
Start: 2022-11-10

## 2022-11-18 NOTE — PERIOP NOTES
PRE-SURGICAL INSTRUCTIONS        Patient's Name:  Deon Miguel. Today's Date:  11/18/2022              Surgery Date:  11/28/2022                Do NOT eat or drink anything, including candy, gum, or ice chips after midnight on 11/28/2022, unless you have specific instructions from your surgeon or anesthesia provider to do so. You may brush your teeth before coming to the hospital.  No smoking 24 hours prior to the day of surgery. No alcohol 24 hours prior to the day of surgery. No recreational drugs for one week prior to the day of surgery. Leave all valuables, including money/purse, at home. Remove all jewelry, nail polish, acrylic nails, and makeup (including mascara); no lotions powders, deodorant, or perfume/cologne/after shave on the skin. Follow instruction for Hibiclens washes and CHG wipes from surgeon's office. Glasses/contact lenses and dentures may be worn to the hospital.  They will be removed prior to surgery. Call your doctor if symptoms of a cold or illness develop within 24-48 hours prior to your surgery. 11.  If you are having an outpatient procedure, please make arrangements for a responsible ADULT TO [address] [place] [address] and stay with you for 24 hours after your surgery. 12. ONE VISITOR in the hospital at this time for outpatient procedures. Exceptions may be made for surgical admissions, per nursing unit guidelines      Special Instructions:      Bring list of CURRENT medications. Bring any pertinent legal medical records. Take these medications the morning of surgery with a sip of water:  per surgeon  Follow physician instructions about insulin. Follow physician instructions about stopping anticoagulants. On the day of surgery, come in the main entrance of [facility]. Let the  at the desk know you are there for surgery. A staff member will come escort you to the surgical area on the second floor.     If you have any questions or concerns, please do not hesitate to call:     (Prior to the day of surgery) PAT department:  [phone]   (Day of surgery) Pre-Op department:  [phone]    These surgical instructions were reviewed with Isaac De La Rosa during the [facility] phone call.

## 2022-11-21 RX ORDER — FLASH GLUCOSE SENSOR
KIT MISCELLANEOUS
Qty: 2 KIT | Refills: 11 | Status: SHIPPED | OUTPATIENT
Start: 2022-11-21

## 2022-11-23 ENCOUNTER — HOSPITAL ENCOUNTER (OUTPATIENT)
Dept: PREADMISSION TESTING | Age: 52
Discharge: HOME | End: 2022-11-23
Payer: COMMERCIAL

## 2022-11-23 ENCOUNTER — TELEPHONE (OUTPATIENT)
Dept: FAMILY MEDICINE CLINIC | Age: 52
End: 2022-11-23

## 2022-11-23 DIAGNOSIS — Z01.818 PREOPERATIVE GENERAL PHYSICAL EXAMINATION: ICD-10-CM

## 2022-11-23 PROCEDURE — 93005 ELECTROCARDIOGRAM TRACING: CPT

## 2022-11-23 NOTE — TELEPHONE ENCOUNTER
Called patient to inform him that MANDO Rashard Fuentes is still waiting on him to go to have his EKG to be cleared for surgery on 11/28/22. Patient stated he forgot about going to get his EKG but he would go over to [facility] shortly to have it done. Patient was informed he can not be cleared for surgery without it. Patient verbalized an understanding of instructions.

## 2022-11-23 NOTE — TELEPHONE ENCOUNTER
Dr. Gwen Trotter office called requesting to speak with  regarding pre-op notes for patient to be cleared for surgery I advised Dr. Gwen Trotter office that provider is waiting for patient to have EkG done prior to being cleared for surgery. Kaitlin Morfin

## 2022-11-24 LAB
ATRIAL RATE: 77 BPM
CALCULATED P AXIS, ECG09: 64 DEGREES
CALCULATED R AXIS, ECG10: 69 DEGREES
CALCULATED T AXIS, ECG11: 30 DEGREES
DIAGNOSIS, 93000: NORMAL
P-R INTERVAL, ECG05: 176 MS
Q-T INTERVAL, ECG07: 362 MS
QRS DURATION, ECG06: 72 MS
QTC CALCULATION (BEZET), ECG08: 409 MS
VENTRICULAR RATE, ECG03: 77 BPM

## 2022-11-25 ENCOUNTER — ANESTHESIA EVENT (OUTPATIENT)
Dept: SURGERY | Age: 52
End: 2022-11-25
Payer: COMMERCIAL

## 2022-11-28 ENCOUNTER — HOSPITAL ENCOUNTER (OUTPATIENT)
Age: 52
Setting detail: OUTPATIENT SURGERY
Discharge: HOME | End: 2022-11-28
Attending: PODIATRIST | Admitting: PODIATRIST
Payer: COMMERCIAL

## 2022-11-28 ENCOUNTER — ANESTHESIA (OUTPATIENT)
Dept: SURGERY | Age: 52
End: 2022-11-28
Payer: COMMERCIAL

## 2022-11-28 VITALS
WEIGHT: 300 LBS | OXYGEN SATURATION: 97 % | TEMPERATURE: 97.8 F | SYSTOLIC BLOOD PRESSURE: 131 MMHG | RESPIRATION RATE: 17 BRPM | DIASTOLIC BLOOD PRESSURE: 93 MMHG | BODY MASS INDEX: 42.95 KG/M2 | HEIGHT: 70 IN | HEART RATE: 80 BPM

## 2022-11-28 LAB
AMPHET UR QL SCN: NEGATIVE
BARBITURATES UR QL SCN: NEGATIVE
BENZODIAZ UR QL: NEGATIVE
CANNABINOIDS UR QL SCN: POSITIVE
COCAINE UR QL SCN: NEGATIVE
GLUCOSE BLD STRIP.AUTO-MCNC: 172 MG/DL (ref 70–110)
GLUCOSE BLD STRIP.AUTO-MCNC: 180 MG/DL (ref 70–110)
HBA1C MFR BLD: 9.3 % (ref 4.2–5.6)
HDSCOM,HDSCOM: ABNORMAL
METHADONE UR QL: NEGATIVE
OPIATES UR QL: NEGATIVE
PCP UR QL: NEGATIVE

## 2022-11-28 PROCEDURE — 76060000033 HC ANESTHESIA 1 TO 1.5 HR: Performed by: PODIATRIST

## 2022-11-28 PROCEDURE — 74011250636 HC RX REV CODE- 250/636: Performed by: NURSE ANESTHETIST, CERTIFIED REGISTERED

## 2022-11-28 PROCEDURE — 87176 TISSUE HOMOGENIZATION CULTR: CPT

## 2022-11-28 PROCEDURE — 77030040361 HC SLV COMPR DVT MDII -B: Performed by: PODIATRIST

## 2022-11-28 PROCEDURE — 77030018836 HC SOL IRR NACL ICUM -A: Performed by: PODIATRIST

## 2022-11-28 PROCEDURE — 77030040922 HC BLNKT HYPOTHRM STRY -A: Performed by: PODIATRIST

## 2022-11-28 PROCEDURE — 77030002933 HC SUT MCRYL J&J -A: Performed by: PODIATRIST

## 2022-11-28 PROCEDURE — 76210000016 HC OR PH I REC 1 TO 1.5 HR: Performed by: PODIATRIST

## 2022-11-28 PROCEDURE — 74011250636 HC RX REV CODE- 250/636: Performed by: PODIATRIST

## 2022-11-28 PROCEDURE — 82962 GLUCOSE BLOOD TEST: CPT

## 2022-11-28 PROCEDURE — 88305 TISSUE EXAM BY PATHOLOGIST: CPT

## 2022-11-28 PROCEDURE — 2709999900 HC NON-CHARGEABLE SUPPLY: Performed by: PODIATRIST

## 2022-11-28 PROCEDURE — 74011250637 HC RX REV CODE- 250/637: Performed by: NURSE ANESTHETIST, CERTIFIED REGISTERED

## 2022-11-28 PROCEDURE — 74011000250 HC RX REV CODE- 250: Performed by: NURSE ANESTHETIST, CERTIFIED REGISTERED

## 2022-11-28 PROCEDURE — 80307 DRUG TEST PRSMV CHEM ANLYZR: CPT

## 2022-11-28 PROCEDURE — 77030006773 HC BLD SAW OSC BRSM -A: Performed by: PODIATRIST

## 2022-11-28 PROCEDURE — 74011000250 HC RX REV CODE- 250: Performed by: PODIATRIST

## 2022-11-28 PROCEDURE — 87205 SMEAR GRAM STAIN: CPT

## 2022-11-28 PROCEDURE — C9290 INJ, BUPIVACAINE LIPOSOME: HCPCS | Performed by: PODIATRIST

## 2022-11-28 PROCEDURE — 77030031139 HC SUT VCRL2 J&J -A: Performed by: PODIATRIST

## 2022-11-28 PROCEDURE — 87075 CULTR BACTERIA EXCEPT BLOOD: CPT

## 2022-11-28 PROCEDURE — 74011636637 HC RX REV CODE- 636/637: Performed by: NURSE ANESTHETIST, CERTIFIED REGISTERED

## 2022-11-28 PROCEDURE — 88311 DECALCIFY TISSUE: CPT

## 2022-11-28 PROCEDURE — 76010000149 HC OR TIME 1 TO 1.5 HR: Performed by: PODIATRIST

## 2022-11-28 PROCEDURE — 83036 HEMOGLOBIN GLYCOSYLATED A1C: CPT

## 2022-11-28 PROCEDURE — 76210000021 HC REC RM PH II 0.5 TO 1 HR: Performed by: PODIATRIST

## 2022-11-28 RX ORDER — LIDOCAINE HYDROCHLORIDE 20 MG/ML
INJECTION, SOLUTION EPIDURAL; INFILTRATION; INTRACAUDAL; PERINEURAL AS NEEDED
Status: DISCONTINUED | OUTPATIENT
Start: 2022-11-28 | End: 2022-11-28 | Stop reason: HOSPADM

## 2022-11-28 RX ORDER — SODIUM CHLORIDE 0.9 % (FLUSH) 0.9 %
5-40 SYRINGE (ML) INJECTION AS NEEDED
Status: DISCONTINUED | OUTPATIENT
Start: 2022-11-28 | End: 2022-11-28 | Stop reason: HOSPADM

## 2022-11-28 RX ORDER — PROPOFOL 10 MG/ML
INJECTION, EMULSION INTRAVENOUS AS NEEDED
Status: DISCONTINUED | OUTPATIENT
Start: 2022-11-28 | End: 2022-11-28 | Stop reason: HOSPADM

## 2022-11-28 RX ORDER — SODIUM CHLORIDE, SODIUM LACTATE, POTASSIUM CHLORIDE, CALCIUM CHLORIDE 600; 310; 30; 20 MG/100ML; MG/100ML; MG/100ML; MG/100ML
50 INJECTION, SOLUTION INTRAVENOUS CONTINUOUS
Status: DISCONTINUED | OUTPATIENT
Start: 2022-11-28 | End: 2022-11-28 | Stop reason: HOSPADM

## 2022-11-28 RX ORDER — SODIUM CHLORIDE, SODIUM LACTATE, POTASSIUM CHLORIDE, CALCIUM CHLORIDE 600; 310; 30; 20 MG/100ML; MG/100ML; MG/100ML; MG/100ML
75 INJECTION, SOLUTION INTRAVENOUS CONTINUOUS
Status: DISCONTINUED | OUTPATIENT
Start: 2022-11-28 | End: 2022-11-28 | Stop reason: HOSPADM

## 2022-11-28 RX ORDER — HUMIDIFIER
EACH MISCELLANEOUS
Qty: 2 EACH | Refills: 0 | Status: SHIPPED | OUTPATIENT
Start: 2022-11-28

## 2022-11-28 RX ORDER — MIDAZOLAM HYDROCHLORIDE 1 MG/ML
INJECTION, SOLUTION INTRAMUSCULAR; INTRAVENOUS AS NEEDED
Status: DISCONTINUED | OUTPATIENT
Start: 2022-11-28 | End: 2022-11-28 | Stop reason: HOSPADM

## 2022-11-28 RX ORDER — ONDANSETRON 2 MG/ML
4 INJECTION INTRAMUSCULAR; INTRAVENOUS ONCE
Status: DISCONTINUED | OUTPATIENT
Start: 2022-11-28 | End: 2022-11-28 | Stop reason: HOSPADM

## 2022-11-28 RX ORDER — HYDROMORPHONE HYDROCHLORIDE 2 MG/ML
0.5 INJECTION, SOLUTION INTRAMUSCULAR; INTRAVENOUS; SUBCUTANEOUS
Status: DISCONTINUED | OUTPATIENT
Start: 2022-11-28 | End: 2022-11-28 | Stop reason: HOSPADM

## 2022-11-28 RX ORDER — PROPOFOL 10 MG/ML
VIAL (ML) INTRAVENOUS
Status: DISCONTINUED | OUTPATIENT
Start: 2022-11-28 | End: 2022-11-28 | Stop reason: HOSPADM

## 2022-11-28 RX ORDER — MAGNESIUM SULFATE 100 %
4 CRYSTALS MISCELLANEOUS AS NEEDED
Status: DISCONTINUED | OUTPATIENT
Start: 2022-11-28 | End: 2022-11-28 | Stop reason: HOSPADM

## 2022-11-28 RX ORDER — FENTANYL CITRATE 50 UG/ML
INJECTION, SOLUTION INTRAMUSCULAR; INTRAVENOUS AS NEEDED
Status: DISCONTINUED | OUTPATIENT
Start: 2022-11-28 | End: 2022-11-28 | Stop reason: HOSPADM

## 2022-11-28 RX ORDER — SODIUM CHLORIDE 0.9 % (FLUSH) 0.9 %
5-40 SYRINGE (ML) INJECTION EVERY 8 HOURS
Status: DISCONTINUED | OUTPATIENT
Start: 2022-11-28 | End: 2022-11-28 | Stop reason: HOSPADM

## 2022-11-28 RX ORDER — INSULIN LISPRO 100 [IU]/ML
INJECTION, SOLUTION INTRAVENOUS; SUBCUTANEOUS ONCE
Status: DISCONTINUED | OUTPATIENT
Start: 2022-11-28 | End: 2022-11-28 | Stop reason: HOSPADM

## 2022-11-28 RX ORDER — FAMOTIDINE 20 MG/1
20 TABLET, FILM COATED ORAL ONCE
Status: COMPLETED | OUTPATIENT
Start: 2022-11-28 | End: 2022-11-28

## 2022-11-28 RX ORDER — GLYCOPYRROLATE 0.2 MG/ML
INJECTION INTRAMUSCULAR; INTRAVENOUS AS NEEDED
Status: DISCONTINUED | OUTPATIENT
Start: 2022-11-28 | End: 2022-11-28 | Stop reason: HOSPADM

## 2022-11-28 RX ORDER — CLINDAMYCIN PHOSPHATE 900 MG/50ML
900 INJECTION, SOLUTION INTRAVENOUS ONCE
Status: COMPLETED | OUTPATIENT
Start: 2022-11-28 | End: 2022-11-28

## 2022-11-28 RX ORDER — NEOMYCIN AND POLYMYXIN B SULFATES 40; 200000 MG/ML; [USP'U]/ML
SOLUTION IRRIGATION AS NEEDED
Status: DISCONTINUED | OUTPATIENT
Start: 2022-11-28 | End: 2022-11-28 | Stop reason: HOSPADM

## 2022-11-28 RX ORDER — DEXTROSE MONOHYDRATE 100 MG/ML
0-250 INJECTION, SOLUTION INTRAVENOUS AS NEEDED
Status: DISCONTINUED | OUTPATIENT
Start: 2022-11-28 | End: 2022-11-28 | Stop reason: HOSPADM

## 2022-11-28 RX ORDER — FENTANYL CITRATE 50 UG/ML
50 INJECTION, SOLUTION INTRAMUSCULAR; INTRAVENOUS AS NEEDED
Status: DISCONTINUED | OUTPATIENT
Start: 2022-11-28 | End: 2022-11-28 | Stop reason: HOSPADM

## 2022-11-28 RX ORDER — INSULIN LISPRO 100 [IU]/ML
INJECTION, SOLUTION INTRAVENOUS; SUBCUTANEOUS ONCE
Status: COMPLETED | OUTPATIENT
Start: 2022-11-28 | End: 2022-11-28

## 2022-11-28 RX ADMIN — FENTANYL CITRATE 25 MCG: 50 INJECTION, SOLUTION INTRAMUSCULAR; INTRAVENOUS at 07:36

## 2022-11-28 RX ADMIN — INSULIN LISPRO 3 UNITS: 100 INJECTION, SOLUTION INTRAVENOUS; SUBCUTANEOUS at 06:41

## 2022-11-28 RX ADMIN — GLYCOPYRROLATE 0.1 MG: 0.2 INJECTION INTRAMUSCULAR; INTRAVENOUS at 07:29

## 2022-11-28 RX ADMIN — PROPOFOL 20 MG: 10 INJECTION, EMULSION INTRAVENOUS at 07:38

## 2022-11-28 RX ADMIN — FENTANYL CITRATE 25 MCG: 50 INJECTION, SOLUTION INTRAMUSCULAR; INTRAVENOUS at 07:59

## 2022-11-28 RX ADMIN — FENTANYL CITRATE 25 MCG: 50 INJECTION, SOLUTION INTRAMUSCULAR; INTRAVENOUS at 07:46

## 2022-11-28 RX ADMIN — LIDOCAINE HYDROCHLORIDE 100 MG: 20 INJECTION, SOLUTION EPIDURAL; INFILTRATION; INTRACAUDAL; PERINEURAL at 07:36

## 2022-11-28 RX ADMIN — CLINDAMYCIN PHOSPHATE 900 MG: 900 INJECTION, SOLUTION INTRAVENOUS at 07:35

## 2022-11-28 RX ADMIN — FAMOTIDINE 20 MG: 20 TABLET ORAL at 06:44

## 2022-11-28 RX ADMIN — SODIUM CHLORIDE, SODIUM LACTATE, POTASSIUM CHLORIDE, AND CALCIUM CHLORIDE 75 ML/HR: 600; 310; 30; 20 INJECTION, SOLUTION INTRAVENOUS at 06:41

## 2022-11-28 RX ADMIN — PROPOFOL 140 MCG/KG/MIN: 10 INJECTION, EMULSION INTRAVENOUS at 07:36

## 2022-11-28 RX ADMIN — MIDAZOLAM HYDROCHLORIDE 2 MG: 2 INJECTION, SOLUTION INTRAMUSCULAR; INTRAVENOUS at 07:29

## 2022-11-28 RX ADMIN — FENTANYL CITRATE 25 MCG: 50 INJECTION, SOLUTION INTRAMUSCULAR; INTRAVENOUS at 08:41

## 2022-11-28 RX ADMIN — GLYCOPYRROLATE 0.1 MG: 0.2 INJECTION INTRAMUSCULAR; INTRAVENOUS at 07:28

## 2022-11-28 NOTE — OP NOTES
[address] [place] [address]   OPERATIVE REPORT    Name:  Bal Iyer  MR#:   [id number]  :  1970  ACCOUNT #:  [de-identified]  DATE OF SERVICE:  2022    PREOPERATIVE DIAGNOSIS:  Charcot deformity with hallux limitus and neuropathic ulcer of great toe joint. POSTOPERATIVE DIAGNOSIS:  Charcot deformity with hallux limitus and neuropathic ulcer of great toe joint. PROCEDURES PERFORMED:  Corrective V osteotomy with internal fixation, Velez arthroplasty with internal fixation, excision and flap closure of ulcer, all right great toe joint. SURGEON:  Merla Romberg, DPM    ASSISTANT:  student    ANESTHESIA:  MAC.    COMPLICATIONS:  0    SPECIMENS REMOVED:  tissue    IMPLANTS:  0    ESTIMATED BLOOD LOSS:  0    PROCEDURE:  On 10/28/2022, the patient was placed on the operating room table in the supine position. After adequate induction of MAC anesthesia, the right lower extremity was prepped and draped in usual sterile fashion under sterile ankle tourniquet hemostasis. Attention was directed to the right great toe joint. There was limited range of motion. Clean superficial ulceration of the plantar medial aspect of the hallux was noted with granular tissue. No deep penetration. No swelling or inflammation. Ulcer was thoroughly debrided back to healthy bleeding tissue, and using a fresh skin blade, linear incision was accomplished dorsally over the great toe joint. Dissection was carried down to the level of the joint. There was severe arthrosis of the joint with gouty tophi formation. Joint was carefully freed with McGlamry elevator and medial release. Hypertrophic medial eminence was resected. Corrective short wing V osteotomy was accomplished transposing the metatarsal head laterally excising hypertrophic medial wedge of bone and then fixating with 0.062 K-wire. Wounds were copiously irrigated with antibiotic solution.   Two similar elliptical incisions were accomplished ellipsing out the ulcer and creating medial and lateral flaps for closure. These were thoroughly irrigated with copious amounts of antibiotic solution. Dissection was carried distally and the base of the proximal phalanx was isolated and released. It was severely degenerated with limited range of motion noted. Base of the proximal phalanx was resected with bone cutting instruments. Wound was thoroughly irrigated. I retrograded 0.062 K-wire across the interval.  Flap closure was, of the ulcer, with Prolene suture and skin staples. The dorsal incision was closed with Vicryl and Prolene. A Betadine compressive dressing was applied. The patient tolerated the procedure and anesthesia well with vital signs stable throughout. The patient was transported to the recovery room in stable condition.       Sol Mak DPM      [id number]  D:  11/28/2022 8:49  T:  11/28/2022 12:20  JOB #:  [id number]  CC:  Mala Tafoya DPM

## 2022-11-28 NOTE — ANESTHESIA POSTPROCEDURE EVALUATION
Procedure(s):  RIGHT FOOT FLAP CLOSURE, EXCISION BONE, JOINT ARTHROPLASTY, BUNION REPAIR/C-ARM. MAC    Anesthesia Post Evaluation      Multimodal analgesia: multimodal analgesia used between 6 hours prior to anesthesia start to PACU discharge  Patient location during evaluation: PACU  Patient participation: complete - patient participated  Level of consciousness: sleepy but conscious  Pain management: adequate  Airway patency: patent  Anesthetic complications: no  Cardiovascular status: acceptable  Respiratory status: acceptable  Hydration status: acceptable  Post anesthesia nausea and vomiting:  controlled  Final Post Anesthesia Temperature Assessment:  Normothermia (36.0-37.5 degrees C)      INITIAL Post-op Vital signs:   Vitals Value Taken Time   /73 11/28/22 0854   Temp 36.3 °C (97.4 °F) 11/28/22 0854   Pulse 81 11/28/22 0901   Resp 12 11/28/22 0901   SpO2 100 % 11/28/22 0901   Vitals shown include unvalidated device data.

## 2022-11-28 NOTE — ANESTHESIA PREPROCEDURE EVALUATION
Relevant Problems   ENDOCRINE   (+) Obesity, morbid ([facility] [address]       Anesthetic History   No history of anesthetic complications            Review of Systems / Medical History  Patient summary reviewed, nursing notes reviewed and pertinent labs reviewed    Pulmonary        Sleep apnea: CPAP           Neuro/Psych   Within defined limits           Cardiovascular    Hypertension: well controlled                   GI/Hepatic/Renal  Within defined limits              Endo/Other    Diabetes: well controlled, type 2    Morbid obesity     Other Findings              Physical Exam    Airway  Mallampati: II  TM Distance: 4 - 6 cm  Neck ROM: normal range of motion   Mouth opening: Normal     Cardiovascular  Regular rate and rhythm,  S1 and S2 normal,  no murmur, click, rub, or gallop  Rhythm: regular  Rate: normal         Dental  No notable dental hx       Pulmonary  Breath sounds clear to auscultation               Abdominal  GI exam deferred       Other Findings            Anesthetic Plan    ASA: 3  Anesthesia type: MAC          Induction: Intravenous  Anesthetic plan and risks discussed with: Patient

## 2022-11-28 NOTE — BRIEF OP NOTE
Brief Postoperative Note    Patient: Radha Tamez. YOB: 1970  MRN: [id number]    Date of Procedure: 11/28/2022     Pre-Op Diagnosis: E13.621 DIABETIC ULCER  L97.522 DIABETIC FOOT  M14.671 CHARCOT RIGHT  M20.21 HALLUX RIGIDUS RIGHT    Post-Op Diagnosis: Same as preoperative diagnosis. Procedure(s):  RIGHT FOOT FLAP CLOSURE, EXCISION BONE, JOINT ARTHROPLASTY, BUNION REPAIR/C-ARM    Surgeon(s):  Amrit Hidalgo DPM    Surgical Assistant: Surg Asst-1: Blanco Doyle    Anesthesia: MAC     Estimated Blood Loss (mL): Minimal    Complications: None    Specimens:   ID Type Source Tests Collected by Time Destination   1 : right foot joint resection [facility]ative [facility], Right  [facility]/28/2022 7422 Pathology   1 : right foot joint resection  Wound Foot, Right CULTURE, ANAEROBIC, CULTURE, WOUND [facility] 11/28/2022 0533 Microbiology        Implants: * No implants in log *    Drains: * No LDAs found *    Findings: Charcot great toe joint with neuropathic ulcer right great toe joint.      Electronically Signed by Hussain Cuba DPM on 11/28/2022 at 8:33 AM

## 2022-11-28 NOTE — DISCHARGE INSTRUCTIONS
Partial Weight Bearing to Right Foot      DISCHARGE SUMMARY from Nurse    PATIENT INSTRUCTIONS:    After general anesthesia or intravenous sedation, for 24 hours or while taking prescription Narcotics:  Limit your activities  Do not drive and operate hazardous machinery  Do not make important personal or business decisions  Do  not drink alcoholic beverages  If you have not urinated within 8 hours after discharge, please contact your surgeon on call. Report the following to your surgeon:  Excessive pain, swelling, redness or odor of or around the surgical area  Temperature over 100.5  Nausea and vomiting lasting longer than 4 hours or if unable to take medications  Any signs of decreased circulation or nerve impairment to extremity: change in color, persistent  numbness, tingling, coldness or increase pain  Any questions      These are general instructions for a healthy lifestyle:    No smoking/ No tobacco products/ Avoid exposure to second hand smoke  Surgeon General's Warning:  Quitting smoking now greatly reduces serious risk to your health. Obesity, smoking, and sedentary lifestyle greatly increases your risk for illness    A healthy diet, regular physical exercise & weight monitoring are important for maintaining a healthy lifestyle    You may be retaining fluid if you have a history of heart failure or if you experience any of the following symptoms:  Weight gain of 3 pounds or more overnight or 5 pounds in a week, increased swelling in our hands or feet or shortness of breath while lying flat in bed. Please call your doctor as soon as you notice any of these symptoms; do not wait until your next office visit. The discharge information has been reviewed with the patient and spouse. The patient and spouse verbalized understanding.   Discharge medications reviewed with the patient and spouse and appropriate educational materials and side effects teaching were provided.   ___________________________________________________________________________________________________________________________________

## 2022-11-28 NOTE — H&P
History and Physical    Patient: Annmarie Schroeder MRN: [id number]  SSN: [id number]    YOB: 1970  Age: 47 y.o. Sex: male      Subjective:      Annmarie Schroeder is a 47 y.o. male who Diabetic with chronic ulceration Right toe. Charcot deformity. Past Medical History:   Diagnosis Date    Diabetes ([address]     Diabetic gangrene ([facility] [address]     Hypercholesterolemia     Hypertension     Intracranial hemorrhage (HCC)     GRAHAM on CPAP      Past Surgical History:   Procedure Laterality Date    HX APPENDECTOMY        Family History   Problem Relation Age of Onset    Cancer Mother         unknown ? breast    Heart Disease Father     No Known Problems Sister     Cancer Brother     No Known Problems Sister     No Known Problems Brother     No Known Problems Brother     Diabetes Daughter     Diabetes Daughter      Social History     Tobacco Use    Smoking status: Some Days    Smokeless tobacco: Never   Substance Use Topics    Alcohol use: Yes     Comment: Occassional use      Prior to Admission medications    Medication Sig Start Date End Date Taking? Authorizing Provider   co-enzyme Q-10 (CoQ-10) 100 mg capsule Take 100 mg by mouth daily. Yes Provider, Historical   clindamycin (CLEOCIN) 300 mg capsule 300 mg. 11/11/22  Yes Provider, Historical   naproxen (NAPROSYN) 500 mg tablet 500 mg. 11/10/22  Yes Provider, Historical   zinc 50 mg tab tablet Take 50 mg by mouth daily. Yes Provider, Historical   insulin glargine (Basaglar KwikPen U-100 Insulin) 100 unit/mL (3 mL) inpn 75 Units by SubCUTAneous route nightly for 90 days. 9/22/22 12/21/22 Yes Peng Gutierrez NP   ergocalciferol (ERGOCALCIFEROL) 1,250 mcg (50,000 unit) capsule TAKE 1 CAPSULE BY MOUTH EVERY SEVEN (7) DAYS. 9/22/22  Yes Peng Gutierrez NP   lidocaine 4 % patch Apply to back and leave on for 12 hours 6/21/22  Yes Jacqueline Stanford,    lisinopril-hydroCHLOROthiazide (PRINZIDE, ZESTORETIC) 20-25 mg per tablet Take 1 Tablet by mouth daily. 5/4/22  Yes Ranny Dark, NP   empagliflozin (Jardiance) 25 mg tablet Take 1 Tablet by mouth daily. 5/4/22  Yes Ranny Dark, NP   dulaglutide (Trulicity) 3 CE/7.5 mL pnij 3 mg by SubCUTAneous route every seven (7) days. 5/4/22  Yes Ranny Dark, NP   sildenafil citrate (VIAGRA) 100 mg tablet Take 1 Tablet by mouth as needed (45 mins prior to intercourse). 5/4/22  Yes Ranny Dark, NP   rosuvastatin (CRESTOR) 20 mg tablet TAKE 1 TABLET BY MOUTH EVERY DAY AT NIGHT 4/13/22  Yes Ranny Dark, NP   multivitamin (ONE A DAY) tablet Take 1 Tab by mouth daily. Yes Provider, Historical   flash glucose sensor (Zep SolarStyle Jarad 2 Sensor) kit Monitor glucose as directed. Dispense 2 sensors 11/21/22   Clydene Beverage T, NP        Allergies   Allergen Reactions    Pcn [Penicillins] Other (comments)     Unknown was told when a child       Review of Systems:  A comprehensive review of systems was negative except for that written in the History of Present Illness. Objective:     Vitals:    11/18/22 1129 11/28/22 0635   BP:  (!) 144/84   Pulse:  76   Resp:  20   Temp:  98.3 °F (36.8 °C)   SpO2:  99%   Weight: 136.1 kg (300 lb) 136.1 kg (300 lb)   Height: 5' 10\" (1.778 m) 5' 10\" (1.778 m)        Physical Exam:  Inspected right foot. Palpable pedal pulses. Loss of protective pain sensation. Limited dorsiflexion right great toe joint,  Superficial ulceration plantar medial right great toe.        Assessment:     Hospital Problems  Date Reviewed: 11/28/2022   None      Plan:     Neuropathic ulcer  with Charcot great toe joint right foot    Signed By: Lo Paul DPM     November 28, 2022

## 2022-12-02 LAB
BACTERIA SPEC CULT: NORMAL
SERVICE CMNT-IMP: NORMAL

## 2022-12-03 LAB
BACTERIA SPEC CULT: ABNORMAL
BACTERIA SPEC CULT: ABNORMAL
GRAM STN SPEC: ABNORMAL
GRAM STN SPEC: ABNORMAL
SERVICE CMNT-IMP: ABNORMAL

## 2022-12-06 ENCOUNTER — VIRTUAL VISIT (OUTPATIENT)
Dept: FAMILY MEDICINE CLINIC | Age: 52
End: 2022-12-06

## 2022-12-06 RX ORDER — ERGOCALCIFEROL 1.25 MG/1
50000 CAPSULE ORAL
Qty: 12 CAPSULE | Refills: 1 | Status: SHIPPED | OUTPATIENT
Start: 2022-12-06

## 2022-12-06 NOTE — PROGRESS NOTES
Navid Bansal (: 1970) is a 47 y.o. male, established patient, here for evaluation of the following chief complaint(s):   No chief complaint on file. Navid Bansal, was evaluated through a synchronous (real-time) audio-video encounter. The patient (or guardian if applicable) is aware that this is a billable service, which includes applicable co-pays. This Virtual Visit was conducted with patient's (and/or legal guardian's) consent. The visit was conducted pursuant to the emergency declaration under the [address] [place] [address], [address] [place] [address] wa[facility] authority and the [employer] and [email or web address] General Act. Patient identification was verified, and a caregiver was present when appropriate. The patient was located at: [place] [phone]  The provider was located at: Facility (Appt Department): [address]  [address]       An [address] [facility] was used to authenticate this note.   -- Zohaib Breaux

## 2022-12-06 NOTE — PROGRESS NOTES
Artis Shelley is a 47 y.o. male who was seen by synchronous (real-time) audio-video technology on 12/6/2022 for Other (Discuss FMLA Paperwork/)    Assessment & Plan:   Diagnoses and all orders for this visit:    1. ERRONEOUS ENCOUNTER--DISREGARD  712  Subjective: Advised will need to have Dr. Ignacio Butt complete FMLA forms given it is related to his surgery. Patient verbalizes understanding and will take forms to his scheduled appt this morning. Prior to Admission medications    Medication Sig Start Date End Date Taking? Authorizing Provider   flash glucose sensor (FreeStyle Jarad 2 Sensor) kit Monitor glucose as directed. Dispense 2 sensors 11/21/22  Yes Natalie Marte NP   co-enzyme Q-10 (CO Q-10) 100 mg capsule Take 100 mg by mouth daily. Yes Provider, Historical   zinc 50 mg tab tablet Take 50 mg by mouth daily. Yes Provider, Historical   insulin glargine (Basaglar KwikPen U-100 Insulin) 100 unit/mL (3 mL) inpn 75 Units by SubCUTAneous route nightly for 90 days. 9/22/22 12/21/22 Yes Natalie Marte NP   ergocalciferol (ERGOCALCIFEROL) 1,250 mcg (50,000 unit) capsule TAKE 1 CAPSULE BY MOUTH EVERY SEVEN (7) DAYS. 9/22/22  Yes Mavis PENDLETON NP   lisinopril-hydroCHLOROthiazide (PRINZIDE, ZESTORETIC) 20-25 mg per tablet Take 1 Tablet by mouth daily. 5/4/22  Yes Natalie Marte NP   empagliflozin (Jardiance) 25 mg tablet Take 1 Tablet by mouth daily. 5/4/22  Yes Natalie Marte NP   sildenafil citrate (VIAGRA) 100 mg tablet Take 1 Tablet by mouth as needed (45 mins prior to intercourse). 5/4/22  Yes Natalie Marte NP   rosuvastatin (CRESTOR) 20 mg tablet TAKE 1 TABLET BY MOUTH EVERY DAY AT NIGHT 4/13/22  Yes Natalie Marte NP   multivitamin (ONE A DAY) tablet Take 1 Tab by mouth daily. Yes Provider, Historical   Crutch misc Partial weight right foot  Patient not taking: Reported on 12/6/2022 11/28/22   Elaine Valdes DPM   clindamycin (CLEOCIN) 300 mg capsule 300 mg.   Patient not taking: Reported on 12/6/2022 11/11/22   Provider, Historical   naproxen (NAPROSYN) 500 mg tablet 500 mg. Patient not taking: Reported on 12/6/2022 11/10/22   Provider, Historical   lidocaine 4 % patch Apply to back and leave on for 12 hours  Patient not taking: Reported on 12/6/2022 6/21/22   Alec Xie, DO   dulaglutide (Trulicity) 3 BM/8.7 mL pnij 3 mg by SubCUTAneous route every seven (7) days. Patient not taking: Reported on 12/6/2022 5/4/22   MANDO Cai    Objective:   No flowsheet data found. General: alert, cooperative, no distress   Mental  status: normal mood, behavior, speech, dress, motor activity, and thought processes, able to follow commands   HENT: NCAT   Neck: no visualized mass   Resp: no respiratory distress   Neuro: no gross deficits   Skin: no discoloration or lesions of concern on visible areas   Psychiatric: normal affect, consistent with stated mood, no evidence of hallucinations     Additional exam findings: We discussed the expected course, resolution and complications of the diagnosis(es) in detail. Medication risks, benefits, costs, interactions, and alternatives were discussed as indicated. I advised him to contact the office if his condition worsens, changes or fails to improve as anticipated. He expressed understanding with the diagnosis(es) and plan. Laine Harley., was evaluated through a synchronous (real-time) audio-video encounter. The patient (or guardian if applicable) is aware that this is a billable service, which includes applicable co-pays. This Virtual Visit was conducted with patient's (and/or legal guardian's) consent. The visit was conducted pursuant to the emergency declaration under the [facility], [address] [place] [address] authority and the [employer] General Act. Patient identification was verified, and a caregiver was present when appropriate.   The patient was located at: Home: [place]  [phone]  The provider was located at:  Facility (Appt Department): [facility]  [address] [facility],

## 2022-12-15 RX ORDER — ERGOCALCIFEROL 1.25 MG/1
50000 CAPSULE ORAL
Qty: 12 CAPSULE | Refills: 1 | Status: CANCELLED | OUTPATIENT
Start: 2022-12-15

## 2023-01-17 ENCOUNTER — DOCUMENTATION ONLY (OUTPATIENT)
Dept: PULMONOLOGY | Age: 53
End: 2023-01-17

## 2023-01-17 NOTE — PROGRESS NOTES
Left message for pt re sleep ref. Has pt had any prior sleep studies/evals in the past? MP has some openings for this week! Ref in system.

## 2023-01-31 DIAGNOSIS — M54.9 DORSALGIA: Primary | ICD-10-CM

## 2023-02-01 DIAGNOSIS — M54.9 DORSALGIA: Primary | ICD-10-CM

## 2023-02-03 DIAGNOSIS — M54.9 DORSALGIA: Primary | ICD-10-CM

## 2023-02-05 DIAGNOSIS — M54.9 DORSALGIA: Primary | ICD-10-CM

## 2023-02-13 ENCOUNTER — HOSPITAL ENCOUNTER (OUTPATIENT)
Facility: HOSPITAL | Age: 53
Setting detail: RECURRING SERIES
Discharge: HOME | End: 2023-02-16
Payer: COMMERCIAL

## 2023-02-13 PROCEDURE — 97140 MANUAL THERAPY 1/> REGIONS: CPT

## 2023-02-13 PROCEDURE — 97162 PT EVAL MOD COMPLEX 30 MIN: CPT

## 2023-02-13 PROCEDURE — 97110 THERAPEUTIC EXERCISES: CPT

## 2023-02-13 NOTE — PROGRESS NOTES
PHYSICAL / OCCUPATIONAL THERAPY - DAILY TREATMENT NOTE (updated )    Patient Name: Gwendolyn Caicedo. Date: 2023    : 1970  Insurance: Payor: L[place] / Plan: [facility] / Product Type: *No Product type* /      Patient  verified Yes     Visit #   Current / Total 1 8   Time   In / Out 1206 1254   Pain   In / Out 3-4 3-4   Subjective Functional Status/Changes: See paper eval   Changes to:  Meds, Allergies, Med Hx, Sx Hx? If yes, update Summary List no       TREATMENT AREA =  Pain in toe of right foot [M79.674]  Hallux limitus of right foot [M20.5X1]    OBJECTIVE    25 min [x]Eval - untimed                        Therapeutic Procedures: Tx Min Billable or 1:1 Min (if diff from Tx Min) Procedure, Rationale, Specifics   13 13 39093 Therapeutic Exercise (timed):  increase ROM, strength, coordination, balance, and proprioception to improve patient's ability to progress to PLOF and address remaining functional goals. (see flow sheet as applicable)     Details if applicable:  explanation, demonstration, performance and distribution of HEP   10 10 06416 Manual Therapy (timed):  decrease edema to improve patient's ability to progress to PLOF and address remaining functional goals. The manual therapy interventions were performed at a separate and distinct time from the therapeutic activities interventions .  (see flow sheet as applicable)     Details if applicable:  decongestive massage to reduce edema          Details if applicable:            Details if applicable:     51 22 [facility] Totals Reminder: bill using total billable min of TIMED therapeutic procedures (example: do not include dry needle or estim unattended, both untimed codes, in totals to left)  8-22 min = 1 unit; 23-37 min = 2 units; 38-52 min = 3 units; 53-67 min = 4 units; 68-82 min = 5 units   Total Total     [x]  Patient Education billed concurrently with other procedures   [x] Review HEP    [] Progressed/Changed HEP, detail:    [] Other detail:       Objective Information/Functional Measures/Assessment    See POC    Patient will continue to benefit from skilled PT / OT services to modify and progress therapeutic interventions, analyze and address functional mobility deficits, analyze and address ROM deficits, analyze and address strength deficits, analyze and address soft tissue restrictions, analyze and cue for proper movement patterns, analyze and modify for postural abnormalities, analyze and address imbalance/dizziness, and instruct in home and community integration to address functional deficits and attain remaining goals. Progress toward goals / Updated goals:  []  See Progress Note/Recertification    Short Term Goals: To be accomplished in 2 weeks  1. Pt will report compliance HEP in order to supplement PT treatment   Eval = established  2. Pt will demonstrate right ankle AROM DF to 5degrees in order to normalize gait and improve ability to bend to pick objects up from the floor   Eval = 0 degrees    Long Term Goals: To be accomplished in 8 treatments  1. Pt will score at least 62 on FOTO in order to improve overall function, decrease pain, and facilitate return to PLOF. Eval = 44  2. Pt will demonstrate right hallux AROM extension to 35degrees in order to facilitate return to PLOF   Eval = 30degrees  3. Pt will demonstrate right ankle IV strength minimum 5-/5 in order to increase ambulation distances and increase community engagement   Eval = 4/5  4.    Pt will demonstrate midfoot circumference of 24.5cm in order to improve his ability to don a shoe for work   Eval = 25.5cm    PLAN  Yes  Continue plan of care  []  Upgrade activities as tolerated  []  Discharge due to :  []  Other:    Chayo Corona, PT    2/13/2023    2:15 PM    Future Appointments   Date Time Provider Arlette Escamilla   2/17/2023  2:30 PM Leah Matos PT [facility]   2/20/2023 11:00 AM [facility]   2/23/2023 11:30 AM [facility] PT [address] [facility]   2/28/2023 11:00 AM Yasmin Farnsworth, [facility]   3/6/2023 11:30 AM Yasmin Farnsworth, [facility]

## 2023-02-13 NOTE — PROGRESS NOTES
[facility] - [facility] [place] [address]  [address] [place], [address] [phone] fax    Plan of Care / Statement of Necessity for Physical Therapy Services     Patient Name: Nikki Laura. 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 Marilee Saldaña is a 46yo male who presents to PT s/p Corrective V osteotomy with internal fixation, Albright arthroplasty with internal fixation, excision and flap closure of ulcer of the right hallux. Pt demonstrates increased edema, decreased ankle strength and ROM, decreased bilateral hip strength, and abnormal gait consistent with post surgical status. Pt scar and ulcer are healing well without acute signs of infection. Pt deficits impair his ability to work, negotiate stairs and the community, and perform ADLs at [facility]. Pt recommended to use SPC due to significant gait deviations. Pt will benefit from skilled PT in order to address listed deficits, decrease pain, and maximize functional potential; pt would likely benefit from 2x/week, however, pt has a limited number of visits per year.        Evaluation Complexity:  History:  HIGH Complexity :3+ comorbidities / personal factors will impact the outcome/ POC ; Examination:  MEDIUM Complexity : 3 Standardized tests and measures addressin body structure, function, activity limitation and / or participation in recreation ;Presentation:  MEDIUM Complexity : Evolving with changing characteristics  ; Clinical Decision Making:  MEDIUM Complexity : FOTO score of 26-74 FOTO score = an established functional score where 100 = no disability  Overall Complexity Rating: MEDIUM  Problem List: pain affecting function, decrease ROM, decrease strength, edema affection function, impaired gait/balance, decrease ADL/functional abilities, decrease activity tolerance, decrease flexibility/joint mobility, and decrease transfer abilities    Treatment Plan may include any combination of the followin Therapeutic Exercise, 74829 Neuromuscular Re-Education, 32144 Manual Therapy, 18837 Therapeutic Activity, 61262 Self Care/Home Management, 83479 Electrical Stim unattended, 41346 Electrical Stim attended, 73747 Gait Training, and Other: heat and cold modalities  Patient / Family readiness to learn indicated by: asking questions, trying to perform skills, interest, return verbalization , and return demonstration   Persons(s) to be included in education: patient (P)  Barriers to Learning/Limitations: none  Patient Goal (s): help me with my foot  Patient Self Reported Health Status: good  Rehabilitation Potential: good    Short Term Goals: To be accomplished in 2 weeks  1. Pt will report compliance HEP in order to supplement PT treatment   Eval = established  2. Pt will demonstrate right ankle AROM DF to 5degrees in order to normalize gait and improve ability to bend to pick objects up from the floor   Eval = 0 degrees    Long Term Goals: To be accomplished in 8 treatments  1. Pt will score at least 62 on FOTO in order to improve overall function, decrease pain, and facilitate return to PLOF. Eval = 44  2. Pt will demonstrate right hallux AROM extension to 35degrees in order to facilitate return to PLOF   Eval = 30degrees  3.    Pt will demonstrate right ankle IV strength minimum 5-/5 in order to increase ambulation distances and increase community engagement   Eval = 4/5  4. Pt will demonstrate midfoot circumference of 24.5cm in order to improve his ability to don a shoe for work   Eval = 25.5cm    Frequency / Duration: Patient would benefit from skilled PT 1-2 times per week for 8 visits. Patient/ Caregiver education and instruction: Diagnosis, prognosis, self care, activity modification, brace/ splint application, and exercises [x]  Plan of care has been reviewed with KADIE MILAN, PT       2/13/2023       10:59 AM  ===================================================================  I certify that the above Therapy Services are being furnished while the patient is under my care. I agree with the treatment plan and certify that this therapy is necessary.     [de-identified] Signature:_________________________   DATE:_________   TIME:________                           Melly Velasquez DPM    ** Signature, Date and Time must be completed for valid certification **  Please sign and return to [facility] - [place] [address]  [address], [address] [phone] fax

## 2023-02-17 ENCOUNTER — HOSPITAL ENCOUNTER (OUTPATIENT)
Facility: HOSPITAL | Age: 53
Setting detail: RECURRING SERIES
Discharge: HOME | End: 2023-02-20
Payer: COMMERCIAL

## 2023-02-17 PROCEDURE — 97140 MANUAL THERAPY 1/> REGIONS: CPT

## 2023-02-17 PROCEDURE — 97530 THERAPEUTIC ACTIVITIES: CPT

## 2023-02-17 PROCEDURE — 97110 THERAPEUTIC EXERCISES: CPT

## 2023-02-17 NOTE — PROGRESS NOTES
PHYSICAL / OCCUPATIONAL THERAPY - DAILY TREATMENT NOTE (updated )    Patient Name: Jalen Coleman. Date: 2023    : 1970  Insurance: Payor: Alex / Plan: [facility] / Product Type: *No Product type* /      Patient  verified Yes     Visit #   Current / Total 2 8   Time   In / Out 230 308   Pain   In / Out 2 1-2   Subjective Functional Status/Changes: Pt reports compliance with HEP but has not been able to do as much edema massage because it's difficult to do on himself   Changes to:  Meds, Allergies, Med Hx, Sx Hx? If yes, update Summary List no       TREATMENT AREA =  Pain in toe of right foot [M79.674]  Hallux limitus of right foot [M20.5X1]    OBJECTIVE         Therapeutic Procedures: Tx Min Billable or 1:1 Min (if diff from Tx Min) Procedure, Rationale, Specifics   10 10 85855 Therapeutic Exercise (timed):  increase ROM, strength, coordination, balance, and proprioception to improve patient's ability to progress to PLOF and address remaining functional goals. (see flow sheet as applicable)     Details if applicable:       530 Therapeutic Activity (timed):  use of dynamic activities replicating functional movements to increase ROM, strength, coordination, balance, and proprioception in order to improve patient's ability to progress to PLOF and address remaining functional goals. (see flow sheet as applicable)     Details if applicable:     10 10 82029 Manual Therapy (timed):  decrease pain, increase ROM, increase tissue extensibility, and decrease edema to improve patient's ability to progress to PLOF and address remaining functional goals. The manual therapy interventions were performed at a separate and distinct time from the therapeutic activities interventions .  (see flow sheet as applicable)     Details if applicable:  decongestion massage; first ray and hallux mobilizations    38 45 [facility] Totals Reminder: bill using total billable min of TIMED therapeutic procedures (example: do not include dry needle or estim unattended, both untimed codes, in totals to left)  8-22 min = 1 unit; 23-37 min = 2 units; 38-52 min = 3 units; 53-67 min = 4 units; 68-82 min = 5 units   Total Total     [x]  Patient Education billed concurrently with other procedures   [x] Review HEP    [] Progressed/Changed HEP, detail:    [] Other detail:       Objective Information/Functional Measures/Assessment    Pt was able to initiate exercises per flow sheet without increase in symptoms. Pt arrived with 0.5cm less edema today and was able to reduce by another 0.75cm after manual therapy. Pt requires cuing to reduce right hip ER substitutions during exercise and gait    Patient will continue to benefit from skilled PT / OT services to modify and progress therapeutic interventions, analyze and address functional mobility deficits, analyze and address ROM deficits, analyze and address strength deficits, analyze and address soft tissue restrictions, analyze and cue for proper movement patterns, analyze and modify for postural abnormalities, analyze and address imbalance/dizziness, and instruct in home and community integration to address functional deficits and attain remaining goals. Progress toward goals / Updated goals:  []  See Progress Note/Recertification    Short Term Goals: To be accomplished in 2 weeks  1. Pt will report compliance HEP in order to supplement PT treatment              Eval = established   Reports compliance  2. Pt will demonstrate right ankle AROM DF to 5degrees in order to normalize gait and improve ability to bend to pick objects up from the floor              Eval = 0 degrees     Long Term Goals: To be accomplished in 8 treatments  1. Pt will score at least 62 on FOTO in order to improve overall function, decrease pain, and facilitate return to PLOF. Eval = 44  2.    Pt will demonstrate right hallux AROM extension to 35degrees in order to facilitate return to PLOF Eval = 30degrees  3. Pt will demonstrate right ankle IV strength minimum 5-/5 in order to increase ambulation distances and increase community engagement              Eval = 4/5  4.    Pt will demonstrate midfoot circumference of 24.5cm in order to improve his ability to don a shoe for work              Eval = 25.5cm   25cm upon arrival; 24.25cm after manual     PLAN  Yes  Continue plan of care  []  Upgrade activities as tolerated  []  Discharge due to :  []  Other:    Ashu Figueroa, PT    2/17/2023    3:25 PM    Future Appointments   Date Time Provider Arlette Escamilla   2/20/2023 11:00 AM [facility]   2/23/2023 11:30 AM [facility] PT [place] [address] [facility]   2/28/2023 11:00 AM [facility]   3/6/2023 11:30 AM [facility]

## 2023-02-20 ENCOUNTER — HOSPITAL ENCOUNTER (OUTPATIENT)
Facility: HOSPITAL | Age: 53
Setting detail: RECURRING SERIES
Discharge: HOME | End: 2023-02-23
Payer: COMMERCIAL

## 2023-02-20 PROCEDURE — 97112 NEUROMUSCULAR REEDUCATION: CPT

## 2023-02-20 PROCEDURE — 97530 THERAPEUTIC ACTIVITIES: CPT

## 2023-02-20 NOTE — PROGRESS NOTES
PHYSICAL / OCCUPATIONAL THERAPY - DAILY TREATMENT NOTE (updated )    Patient Name: Arlette Feng. Date: 2023    : 1970  Insurance: Payor: Monae Burdick / Plan: [facility] / Product Type: *No Product type* /      Patient  verified Yes     Visit #   Current / Total 3 8   Time   In / Out 235 312   Pain   In / Out 0 1   Subjective Functional Status/Changes: Pt reports he is feeling less swollen today, so he tried a regular shoe   Changes to:  Meds, Allergies, Med Hx, Sx Hx? If yes, update Summary List no       TREATMENT AREA =  Pain in toe of right foot [M79.674]  Hallux limitus of right foot [M20.5X1]    OBJECTIVE         Therapeutic Procedures: Tx Min Billable or 1:1 Min (if diff from Tx Min) Procedure, Rationale, Specifics   10 7 93669 Therapeutic Exercise (timed):  increase ROM, strength, coordination, balance, and proprioception to improve patient's ability to progress to PLOF and address remaining functional goals. (see flow sheet as applicable)     Details if applicable:       10  72472 Neuromuscular Re-Education (timed):  improve balance, coordination, kinesthetic sense, posture, core stability and proprioception to improve patient's ability to develop conscious control of individual muscles and awareness of position of extremities in order to progress to PLOF and address remaining functional goals. (see flow sheet as applicable)     Details if applicable:     17  82185 Therapeutic Activity (timed):  use of dynamic activities replicating functional movements to increase ROM, strength, coordination, balance, and proprioception in order to improve patient's ability to progress to PLOF and address remaining functional goals.   (see flow sheet as applicable)     Details if applicable:     37 29 [facility] Totals Reminder: bill using total billable min of TIMED therapeutic procedures (example: do not include dry needle or estim unattended, both untimed codes, in totals to left)  8-22 min = 1 unit; 23-37 min = 2 units; 38-52 min = 3 units; 53-67 min = 4 units; 68-82 min = 5 units   Total Total     [x]  Patient Education billed concurrently with other procedures   [x] Review HEP    [] Progressed/Changed HEP, detail:    [] Other detail:       Objective Information/Functional Measures/Assessment    Pt DF AROM is improving as noted below; pt has met STG 2. Pt continues to be challenged with IV strength and activation to improve longitudinal arch of the foot. Cues to reduce hip ER substitutions with standing exercises. Did well with balance, plant to progress NV    Patient will continue to benefit from skilled PT / OT services to modify and progress therapeutic interventions, analyze and address functional mobility deficits, analyze and address ROM deficits, analyze and address strength deficits, analyze and address soft tissue restrictions, analyze and cue for proper movement patterns, analyze and modify for postural abnormalities, analyze and address imbalance/dizziness, and instruct in home and community integration to address functional deficits and attain remaining goals. Progress toward goals / Updated goals:  []  See Progress Note/Recertification    1. Pt will report compliance HEP in order to supplement PT treatment              Eval = established   Reports compliance, updated today   2. Pt will demonstrate right ankle AROM DF to 5degrees in order to normalize gait and improve ability to bend to pick objects up from the floor              Eval = 0 degrees   MET = 6degrees     Long Term Goals: To be accomplished in 8 treatments  1. Pt will score at least 62 on FOTO in order to improve overall function, decrease pain, and facilitate return to PLOF. Eval = 44   To be reassessed at end of POC or 30days   2. Pt will demonstrate right hallux AROM extension to 35degrees in order to facilitate return to PLOF              Eval = 30degrees   Measure NV  3.    Pt will demonstrate right ankle IV strength minimum 5-/5 in order to increase ambulation distances and increase community engagement              Eval = 4/5   Tolerating progression of exercises  4.    Pt will demonstrate midfoot circumference of 24.5cm in order to improve his ability to don a shoe for work              Eval = 25.5cm   Progressing = 24.75cm     PLAN  Yes  Continue plan of care  []  Upgrade activities as tolerated  []  Discharge due to :  []  Other:    Gurwinder Orozco, PT    2/20/2023    3:04 PM    Future Appointments   Date Time Provider Arlette Escamilla   2/23/2023 11:30 AM [address] [place], [place] [address]   2/28/2023 11:00 AM [facility]   3/6/2023 11:30 AM [facility]

## 2023-02-23 ENCOUNTER — HOSPITAL ENCOUNTER (OUTPATIENT)
Facility: HOSPITAL | Age: 53
Setting detail: RECURRING SERIES
Discharge: HOME | End: 2023-02-26
Payer: COMMERCIAL

## 2023-02-23 PROCEDURE — 97110 THERAPEUTIC EXERCISES: CPT

## 2023-02-23 PROCEDURE — 97112 NEUROMUSCULAR REEDUCATION: CPT

## 2023-02-23 PROCEDURE — 97530 THERAPEUTIC ACTIVITIES: CPT

## 2023-03-06 ENCOUNTER — APPOINTMENT (OUTPATIENT)
Facility: HOSPITAL | Age: 53
End: 2023-03-06
Payer: COMMERCIAL

## 2023-03-06 ENCOUNTER — TELEPHONE (OUTPATIENT)
Facility: HOSPITAL | Age: 53
End: 2023-03-06

## 2023-03-06 NOTE — TELEPHONE ENCOUNTER
following up on appt for 3/6. Pt called and rescheduled (for same week) on the morning of 3/6. It failed to go through the system.  But confirmed he's rescheduled for 3/8 Airborne/Contact

## 2023-03-07 ENCOUNTER — TELEMEDICINE (OUTPATIENT)
Age: 53
End: 2023-03-07
Payer: COMMERCIAL

## 2023-03-07 DIAGNOSIS — E11.65 TYPE 2 DIABETES MELLITUS WITH HYPERGLYCEMIA, WITH LONG-TERM CURRENT USE OF INSULIN (HCC): Primary | ICD-10-CM

## 2023-03-07 DIAGNOSIS — Z79.4 TYPE 2 DIABETES MELLITUS WITH HYPERGLYCEMIA, WITH LONG-TERM CURRENT USE OF INSULIN (HCC): Primary | ICD-10-CM

## 2023-03-07 DIAGNOSIS — I10 ESSENTIAL (PRIMARY) HYPERTENSION: ICD-10-CM

## 2023-03-07 DIAGNOSIS — E78.1 PURE HYPERGLYCERIDEMIA: ICD-10-CM

## 2023-03-07 DIAGNOSIS — E55.9 VITAMIN D DEFICIENCY, UNSPECIFIED: ICD-10-CM

## 2023-03-07 PROCEDURE — 99214 OFFICE O/P EST MOD 30 MIN: CPT | Performed by: NURSE PRACTITIONER

## 2023-03-07 RX ORDER — SILDENAFIL 100 MG/1
100 TABLET, FILM COATED ORAL PRN
Qty: 30 TABLET | Refills: 1 | Status: SHIPPED | OUTPATIENT
Start: 2023-03-07

## 2023-03-07 RX ORDER — SEMAGLUTIDE 0.68 MG/ML
INJECTION, SOLUTION SUBCUTANEOUS
Qty: 3 ML | Refills: 0 | Status: SHIPPED | OUTPATIENT
Start: 2023-03-07 | End: 2023-04-04

## 2023-03-07 RX ORDER — CYCLOBENZAPRINE HCL 10 MG
10 TABLET ORAL 3 TIMES DAILY PRN
COMMUNITY

## 2023-03-07 SDOH — ECONOMIC STABILITY: FOOD INSECURITY: WITHIN THE PAST 12 MONTHS, YOU WORRIED THAT YOUR FOOD WOULD RUN OUT BEFORE YOU GOT MONEY TO BUY MORE.: SOMETIMES TRUE

## 2023-03-07 SDOH — ECONOMIC STABILITY: FOOD INSECURITY: WITHIN THE PAST 12 MONTHS, THE FOOD YOU BOUGHT JUST DIDN'T LAST AND YOU DIDN'T HAVE MONEY TO GET MORE.: SOMETIMES TRUE

## 2023-03-07 SDOH — ECONOMIC STABILITY: INCOME INSECURITY: HOW HARD IS IT FOR YOU TO PAY FOR THE VERY BASICS LIKE FOOD, HOUSING, MEDICAL CARE, AND HEATING?: SOMEWHAT HARD

## 2023-03-07 SDOH — ECONOMIC STABILITY: HOUSING INSECURITY
IN THE LAST 12 MONTHS, WAS THERE A TIME WHEN YOU DID NOT HAVE A STEADY PLACE TO SLEEP OR SLEPT IN A SHELTER (INCLUDING NOW)?: PATIENT REFUSED

## 2023-03-07 ASSESSMENT — PATIENT HEALTH QUESTIONNAIRE - PHQ9
SUM OF ALL RESPONSES TO PHQ9 QUESTIONS 1 & 2: 2
SUM OF ALL RESPONSES TO PHQ QUESTIONS 1-9: 2
1. LITTLE INTEREST OR PLEASURE IN DOING THINGS: 1
SUM OF ALL RESPONSES TO PHQ QUESTIONS 1-9: 2
SUM OF ALL RESPONSES TO PHQ QUESTIONS 1-9: 2
2. FEELING DOWN, DEPRESSED OR HOPELESS: 1
SUM OF ALL RESPONSES TO PHQ QUESTIONS 1-9: 2

## 2023-03-07 NOTE — PROGRESS NOTES
Isabel Lopez (:  1970) is a Established patient, here for evaluation of the following:      Isabel Lopez, was evaluated through a synchronous (real-time) audio-video encounter. The patient (or guardian if applicable) is aware that this is a billable service, which includes applicable co-pays. This Virtual Visit was conducted with patient's (and/or legal guardian's) consent. The visit was conducted pursuant to the emergency declaration under the [address] [place] [address], [address] [place] [address] authority and the [employer] General Act. Patient identification was verified, and a caregiver was present when appropriate.    The patient was located at Home: [place] [phone]  Provider was located at Facility (Appt Dept): [address] [facility]  [place],  [address] [address]         --Jaqui Barton LPN
and complications of the diagnosis(es) in detail. Medication risks, benefits, costs, interactions, and alternatives were discussed as indicated. I advised him to contact the office if his condition worsens, changes or fails to improve as anticipated. He expressed understanding with the diagnosis(es) and plan. Consent: Marquis Agrawal, who was seen by synchronous (real-time) audio-video technology, and/or his healthcare decision maker, is aware that this patient-initiated, Telehealth encounter on 3/7/2023 is a billable service, with coverage as determined by his insurance carrier. He is aware that he may receive a bill and has provided verbal consent to proceed: yes    Marquis Agrawal, was evaluated through a synchronous (real-time) audio-video encounter. The patient (or guardian if applicable) is aware that this is a billable service, which includes applicable co-pays. This Virtual Visit was conducted with patient's (and/or legal guardian's) consent. The visit was conducted pursuant to the emergency declaration under the [address] [place] [address], [address] [place] [address] authority and the [employer] General Act. Patient identification was verified, and a caregiver was present when appropriate. The patient was located at Home: [place] [phone]  Provider was located at [facility] (Appt Dept): [address],  [address] [address]         TABITHA Mora NP  An electronic signature was used to authenticate this note.

## 2023-03-08 ENCOUNTER — HOSPITAL ENCOUNTER (OUTPATIENT)
Facility: HOSPITAL | Age: 53
Setting detail: RECURRING SERIES
Discharge: HOME | End: 2023-03-11
Payer: COMMERCIAL

## 2023-03-08 PROCEDURE — 97530 THERAPEUTIC ACTIVITIES: CPT

## 2023-03-08 PROCEDURE — 97112 NEUROMUSCULAR REEDUCATION: CPT

## 2023-03-08 PROCEDURE — 97110 THERAPEUTIC EXERCISES: CPT

## 2023-03-08 NOTE — PROGRESS NOTES
PHYSICAL / OCCUPATIONAL THERAPY - DAILY TREATMENT NOTE (updated )    Patient Name: Buster Oseguera. Date: 3/8/2023    : 1970  Insurance: Payor: [employer] / Plan: [facility] / Product Type: *No Product type* /      Patient  verified Yes     Visit #   Current / Total 5 8   Time   In / Out 1032 1111   Pain   In / Out 1 1   Subjective Functional Status/Changes: \"Everything is better. \"   Changes to:  Meds, Allergies, Med Hx, Sx Hx? If yes, update Summary List no       TREATMENT AREA =  Pain in toe of right foot [M79.674]  Hallux limitus of right foot [M20.5X1]    OBJECTIVE         Therapeutic Procedures: Tx Min Billable or 1:1 Min (if diff from Tx Min) Procedure, Rationale, Specifics   10 10 02861 Therapeutic Exercise (timed):  increase ROM, strength, coordination, balance, and proprioception to improve patient's ability to progress to PLOF and address remaining functional goals. (see flow sheet as applicable)     Details if applicable:       10 10 31705 Neuromuscular Re-Education (timed):  improve balance, coordination, kinesthetic sense, posture, core stability and proprioception to improve patient's ability to develop conscious control of individual muscles and awareness of position of extremities in order to progress to PLOF and address remaining functional goals. (see flow sheet as applicable)     Details if applicable:      93380 Therapeutic Activity (timed):  use of dynamic activities replicating functional movements to increase ROM, strength, coordination, balance, and proprioception in order to improve patient's ability to progress to PLOF and address remaining functional goals.   (see flow sheet as applicable)     Details if applicable:            Details if applicable:            Details if applicable:     44 39 [facility] Totals Reminder: bill using total billable min of TIMED therapeutic procedures (example: do not include dry needle or estim unattended, both untimed codes, in totals to left)  8-22 min = 1 unit; 23-37 min = 2 units; 38-52 min = 3 units; 53-67 min = 4 units; 68-82 min = 5 units   Total Total     [x]  Patient Education billed concurrently with other procedures   [x] Review HEP    [] Progressed/Changed HEP, detail:    [] Other detail:       Objective Information/Functional Measures/Assessment    FOTO 52    AROM right hallux extension 30 deg    MMT right ankle IV 5/5    Right midfoot circumference 26 cm    Mr. Tino Chambers reports 70-80% improvement since beginning PT. Pt is still lacking some right hallux extension, but overall doing well functionally. He is ambulating without an AD and reports ease with all basic ADLs. He still has some swelling in his midfoot. Noticed a new wound on the medial side of his right great toe today. Advised pt to keep an eye on that and call the MD to get it looked it. He didn't know how he got the new wound on his right great toe. Due to pt having limited PT visits for the calendar year he would like to conserve his remaining visits. We will discharge to an updated HEP at this time. Progress toward goals / Updated goals:  []  See Progress Note/Recertification    1. Pt will report compliance HEP in order to supplement PT treatment              Eval = established              MET  2. Pt will demonstrate right ankle AROM DF to 5degrees in order to normalize gait and improve ability to bend to pick objects up from the floor              Eval = 0 degrees              MET = 6degrees at last visit; past neutral today with varied stretches. Long Term Goals: To be accomplished in 8 treatments  1. Pt will score at least 62 on FOTO in order to improve overall function, decrease pain, and facilitate return to PLOF. Eval = 44              PROGRESSING; 52  2. Pt will demonstrate right hallux AROM extension to 35degrees in order to facilitate return to PLOF              Eval = 30 degrees              NOT MET; 30 deg  3.    Pt will demonstrate right ankle IV strength minimum 5-/5 in order to increase ambulation distances and increase community engagement              Eval = 4/5              MET; 5/5  4. Pt will demonstrate midfoot circumference of 24.5cm in order to improve his ability to don a shoe for work              Eval = 25.5cm             NOT MET; 26 cm    Functional Gains: walking without AD, walking/standing tolerance, pain, ADLs  Functional Deficits: swelling, full ROM  % improvement: 70-80%  Pain   Average: 2/10       Best: 0/10     Worst: 4-5/10  Patient Goal: \"feel normal again. \"    PLAN  No  Continue plan of care  []  Upgrade activities as tolerated  [x]  Discharge due to : 1629 Yoe  visits remaining for year  []  Other:    Manjeet Tobar, KADIE, CSCS    3/8/2023    10:40 AM    No future appointments.

## 2023-03-08 NOTE — PROGRESS NOTES
Physical Therapy Discharge Instructions      [facility]  [address] [facility], [address]  [phone] fax      Patient: Alecia Medina.   : 1970      Continue Home Exercise Program 1-2 times per day       Continue with    [x] Ice  as needed      [] Heat           Follow up with MD:     [x] Upon completion of therapy     [x] As needed      Recommendations:     [x]   Return to activity with home program    []   Return to activity with the following modifications:       []Post Rehab Program    []Join Independent aquatic program     [x]Return to/join local gym      Arlette Mir PTA, CARMEN  3/8/2023 11:07 AM Anxiety

## 2023-03-08 NOTE — PROGRESS NOTES
[facility] - [facility]  [address] [place], [address] [phone] fax    DISCHARGE SUMMARY  Patient Name: Nikki Laura. : 1970   Treatment/Medical Diagnosis: Pain in toe of right foot [M79.674]  Hallux limitus of right foot [M20.5X1]   Referral Source: Stuart Lujan DPM     Date of Initial Visit: 2023 Attended Visits: 5 Missed Visits: 1     SUMMARY OF TREATMENT  Mr. Marilee Saldaña reports 70-80% improvement since beginning PT. Pt is still lacking some right hallux extension, but overall doing well functionally. He is ambulating without an AD and reports ease with all basic ADLs. He still has some swelling in his midfoot. Noticed a new wound on the medial side of his right great toe today. Advised pt to keep an eye for infection and healing progress with the recommendation to call the MD to get it looked at. He didn't know how he got the new wound on his right great toe. Due to pt having limited PT visits for the calendar year he would like to conserve his remaining visits. We will discharge to an updated HEP at this time. CURRENT STATUS  Short Term Goals:   1. Pt will report compliance HEP in order to supplement PT treatment              Eval = established              MET  2. Pt will demonstrate right ankle AROM DF to 5degrees in order to normalize gait and improve ability to bend to pick objects up from the floor              Eval = 0 degrees              MET = 6degrees at last visit; past neutral today with varied stretches. Long Term Goals: To be accomplished in 8 treatments  1. Pt will score at least 62 on FOTO in order to improve overall function, decrease pain, and facilitate return to PLOF. Eval = 44              PROGRESSING; 52  2. Pt will demonstrate right hallux AROM extension to 35degrees in order to facilitate return to PLOF              Eval = 30 degrees              NOT MET; 30 deg  3.    Pt will demonstrate right ankle IV strength minimum 5-/5 in order to increase ambulation distances and increase community engagement              Eval = 4/5              MET; 5/5  4. Pt will demonstrate midfoot circumference of 24.5cm in order to improve his ability to don a shoe for work              Eval = 25.5cm             NOT MET; 26 cm     Functional Gains: walking without AD, walking/standing tolerance, pain, ADLs  Functional Deficits: swelling, full ROM  % improvement: 70-80%  Pain   Average: 2/10                  Best: 0/10                Worst: 4-5/10  Patient Goal: \"feel normal again. \"      RECOMMENDATIONS  We are discharging to an updated HEP to allow pt to conserve PT visits remaining for the year and to continue to work on ROM and mobility; recommendation to return to MD regarding new wound. If you have any questions/comments please contact us directly at [phone]. Thank you for allowing us to assist in the care of your patient.   PTA signature  Yazmin Camarena PTA, CSCS     Therapist Signature: Tresa Jovel DPT, OCS, CMTPT/DN Date: 3/8/23     Time: 11:26 AM

## 2023-03-09 ENCOUNTER — TELEPHONE (OUTPATIENT)
Age: 53
End: 2023-03-09

## 2023-04-18 ENCOUNTER — OFFICE VISIT (OUTPATIENT)
Age: 53
End: 2023-04-18
Payer: COMMERCIAL

## 2023-04-18 VITALS
BODY MASS INDEX: 41.69 KG/M2 | HEIGHT: 70 IN | SYSTOLIC BLOOD PRESSURE: 118 MMHG | RESPIRATION RATE: 20 BRPM | HEART RATE: 93 BPM | TEMPERATURE: 98.8 F | OXYGEN SATURATION: 97 % | DIASTOLIC BLOOD PRESSURE: 67 MMHG | WEIGHT: 291.2 LBS

## 2023-04-18 DIAGNOSIS — Z79.4 TYPE 2 DIABETES MELLITUS WITH HYPERGLYCEMIA, WITH LONG-TERM CURRENT USE OF INSULIN (HCC): Primary | ICD-10-CM

## 2023-04-18 DIAGNOSIS — E11.65 TYPE 2 DIABETES MELLITUS WITH HYPERGLYCEMIA, WITH LONG-TERM CURRENT USE OF INSULIN (HCC): Primary | ICD-10-CM

## 2023-04-18 PROCEDURE — 99214 OFFICE O/P EST MOD 30 MIN: CPT | Performed by: NURSE PRACTITIONER

## 2023-04-18 PROCEDURE — 3046F HEMOGLOBIN A1C LEVEL >9.0%: CPT | Performed by: NURSE PRACTITIONER

## 2023-04-18 RX ORDER — BLOOD-GLUCOSE SENSOR
EACH MISCELLANEOUS
Qty: 1 EACH | Refills: 3 | Status: SHIPPED | OUTPATIENT
Start: 2023-04-18

## 2023-04-18 RX ORDER — ROSUVASTATIN CALCIUM 20 MG/1
20 TABLET, COATED ORAL NIGHTLY
Qty: 90 TABLET | Refills: 3 | Status: SHIPPED | OUTPATIENT
Start: 2023-04-18

## 2023-04-18 RX ORDER — SEMAGLUTIDE 1.34 MG/ML
1 INJECTION, SOLUTION SUBCUTANEOUS WEEKLY
Qty: 3 ML | Refills: 1 | Status: SHIPPED | OUTPATIENT
Start: 2023-04-18

## 2023-04-18 RX ORDER — MELOXICAM 15 MG/1
TABLET ORAL
COMMUNITY
Start: 2023-04-08

## 2023-04-18 RX ORDER — BLOOD-GLUCOSE,RECEIVER,CONT
EACH MISCELLANEOUS
Qty: 1 EACH | Refills: 0 | Status: SHIPPED | OUTPATIENT
Start: 2023-04-18

## 2023-04-18 RX ORDER — ERGOCALCIFEROL 1.25 MG/1
50000 CAPSULE ORAL
Qty: 12 CAPSULE | Refills: 1 | Status: SHIPPED | OUTPATIENT
Start: 2023-04-18

## 2023-04-18 RX ORDER — LISINOPRIL AND HYDROCHLOROTHIAZIDE 25; 20 MG/1; MG/1
1 TABLET ORAL DAILY
Qty: 90 TABLET | Refills: 3 | Status: SHIPPED | OUTPATIENT
Start: 2023-04-18

## 2023-04-18 RX ORDER — SILDENAFIL 100 MG/1
100 TABLET, FILM COATED ORAL PRN
Qty: 30 TABLET | Refills: 1 | Status: SHIPPED | OUTPATIENT
Start: 2023-04-18

## 2023-04-18 RX ORDER — CYCLOBENZAPRINE HCL 10 MG
10 TABLET ORAL 3 TIMES DAILY PRN
Qty: 40 TABLET | Refills: 2 | Status: SHIPPED | OUTPATIENT
Start: 2023-04-18

## 2023-04-18 ASSESSMENT — PATIENT HEALTH QUESTIONNAIRE - PHQ9
SUM OF ALL RESPONSES TO PHQ QUESTIONS 1-9: 0
2. FEELING DOWN, DEPRESSED OR HOPELESS: 0
SUM OF ALL RESPONSES TO PHQ QUESTIONS 1-9: 0
SUM OF ALL RESPONSES TO PHQ QUESTIONS 1-9: 0
1. LITTLE INTEREST OR PLEASURE IN DOING THINGS: 0
SUM OF ALL RESPONSES TO PHQ QUESTIONS 1-9: 0
SUM OF ALL RESPONSES TO PHQ9 QUESTIONS 1 & 2: 0

## 2023-05-02 NOTE — Clinical Note
[address] [facility] EMERGENCY DEPT  [address] [address] [phone]    Work/School Note    Date: 9/1/2022    To Whom It May concern:    Nayeli Bryson was seen and treated today in the emergency room by the following provider(s):  Attending Provider: Nanci Villareal MD.      Nayeli Bryson is excused from work/school on 9/2/2022 through 9/4/2022. He is medically clear to return to work/school on 9/5/2022.          Sincerely,          Froy Moya MD Addended by: Nazanin Luz on: 5/2/2023 03:19 PM     Modules accepted: Orders, SmartSet Pt remains in MICU: Continuous BiPAP @ 50%. Family changed code status to DNR-CCA, No reintubation, No Trach/Peg. Palliative following for goals of care.  If patient stabilizes discharge plan will be [facility] or return to [facility]. CM to follow    Dylan SHOREN, RN  [facility] Case Management  [phone]

## 2023-05-16 ENCOUNTER — TELEMEDICINE (OUTPATIENT)
Age: 53
End: 2023-05-16
Payer: COMMERCIAL

## 2023-05-16 DIAGNOSIS — Z79.4 TYPE 2 DIABETES MELLITUS WITH HYPERGLYCEMIA, WITH LONG-TERM CURRENT USE OF INSULIN (HCC): Primary | ICD-10-CM

## 2023-05-16 DIAGNOSIS — E66.01 OBESITY, CLASS III, BMI 40-49.9 (MORBID OBESITY) (HCC): ICD-10-CM

## 2023-05-16 DIAGNOSIS — E11.65 TYPE 2 DIABETES MELLITUS WITH HYPERGLYCEMIA, WITH LONG-TERM CURRENT USE OF INSULIN (HCC): Primary | ICD-10-CM

## 2023-05-16 PROCEDURE — 99213 OFFICE O/P EST LOW 20 MIN: CPT | Performed by: NURSE PRACTITIONER

## 2023-05-16 PROCEDURE — 3046F HEMOGLOBIN A1C LEVEL >9.0%: CPT | Performed by: NURSE PRACTITIONER

## 2023-05-16 RX ORDER — SEMAGLUTIDE 2.68 MG/ML
2 INJECTION, SOLUTION SUBCUTANEOUS
Qty: 3 ML | Refills: 3 | Status: SHIPPED | OUTPATIENT
Start: 2023-05-16

## 2023-05-16 RX ORDER — SODIUM FLUORIDE 1.1 G/100G
GEL, DENTIFRICE ORAL
COMMUNITY
Start: 2023-05-09

## 2023-05-16 RX ORDER — BLOOD-GLUCOSE SENSOR
EACH MISCELLANEOUS
Qty: 2 EACH | Refills: 3 | Status: SHIPPED | OUTPATIENT
Start: 2023-05-16

## 2023-05-16 ASSESSMENT — PATIENT HEALTH QUESTIONNAIRE - PHQ9
SUM OF ALL RESPONSES TO PHQ QUESTIONS 1-9: 0
1. LITTLE INTEREST OR PLEASURE IN DOING THINGS: 0
SUM OF ALL RESPONSES TO PHQ QUESTIONS 1-9: 0
SUM OF ALL RESPONSES TO PHQ9 QUESTIONS 1 & 2: 0
SUM OF ALL RESPONSES TO PHQ QUESTIONS 1-9: 0
SUM OF ALL RESPONSES TO PHQ QUESTIONS 1-9: 0
2. FEELING DOWN, DEPRESSED OR HOPELESS: 0

## 2023-07-12 NOTE — TELEPHONE ENCOUNTER
Last Visit: 05- VV   Next Appointment: 07-  Previous Refill Encounter: 04- #30 tabs with 1 refills      Requested Prescriptions     Pending Prescriptions Disp Refills    sildenafil (VIAGRA) 100 MG tablet 30 tablet 1     Sig: Take 1 tablet by mouth as needed for Erectile Dysfunction

## 2023-07-14 RX ORDER — SILDENAFIL 100 MG/1
100 TABLET, FILM COATED ORAL PRN
Qty: 30 TABLET | Refills: 1 | Status: SHIPPED | OUTPATIENT
Start: 2023-07-14

## 2023-07-26 ENCOUNTER — HOSPITAL ENCOUNTER (OUTPATIENT)
Facility: HOSPITAL | Age: 53
Setting detail: SPECIMEN
Discharge: HOME | End: 2023-07-29
Payer: COMMERCIAL

## 2023-07-26 ENCOUNTER — OFFICE VISIT (OUTPATIENT)
Age: 53
End: 2023-07-26
Payer: COMMERCIAL

## 2023-07-26 VITALS
TEMPERATURE: 97.3 F | SYSTOLIC BLOOD PRESSURE: 119 MMHG | RESPIRATION RATE: 20 BRPM | HEIGHT: 70 IN | WEIGHT: 281.4 LBS | HEART RATE: 98 BPM | OXYGEN SATURATION: 100 % | DIASTOLIC BLOOD PRESSURE: 61 MMHG | BODY MASS INDEX: 40.29 KG/M2

## 2023-07-26 DIAGNOSIS — Z79.4 TYPE 2 DIABETES MELLITUS WITH HYPERGLYCEMIA, WITH LONG-TERM CURRENT USE OF INSULIN (HCC): ICD-10-CM

## 2023-07-26 DIAGNOSIS — Z12.5 SCREENING FOR PROSTATE CANCER: ICD-10-CM

## 2023-07-26 DIAGNOSIS — E55.9 VITAMIN D DEFICIENCY, UNSPECIFIED: ICD-10-CM

## 2023-07-26 DIAGNOSIS — Z79.4 TYPE 2 DIABETES MELLITUS WITH HYPERGLYCEMIA, WITH LONG-TERM CURRENT USE OF INSULIN (HCC): Primary | ICD-10-CM

## 2023-07-26 DIAGNOSIS — Z12.11 SCREENING FOR COLORECTAL CANCER: ICD-10-CM

## 2023-07-26 DIAGNOSIS — E78.1 PURE HYPERGLYCERIDEMIA: ICD-10-CM

## 2023-07-26 DIAGNOSIS — E11.65 TYPE 2 DIABETES MELLITUS WITH HYPERGLYCEMIA, WITH LONG-TERM CURRENT USE OF INSULIN (HCC): Primary | ICD-10-CM

## 2023-07-26 DIAGNOSIS — E11.65 TYPE 2 DIABETES MELLITUS WITH HYPERGLYCEMIA, WITH LONG-TERM CURRENT USE OF INSULIN (HCC): ICD-10-CM

## 2023-07-26 DIAGNOSIS — I10 ESSENTIAL (PRIMARY) HYPERTENSION: ICD-10-CM

## 2023-07-26 DIAGNOSIS — Z12.12 SCREENING FOR COLORECTAL CANCER: ICD-10-CM

## 2023-07-26 DIAGNOSIS — E66.01 OBESITY, CLASS III, BMI 40-49.9 (MORBID OBESITY) (HCC): ICD-10-CM

## 2023-07-26 LAB
25(OH)D3 SERPL-MCNC: 50.9 NG/ML (ref 30–100)
ALBUMIN SERPL-MCNC: 4.2 G/DL (ref 3.4–5)
ALBUMIN/GLOB SERPL: 1.1 (ref 0.8–1.7)
ALP SERPL-CCNC: 69 U/L (ref 45–117)
ALT SERPL-CCNC: 48 U/L (ref 16–61)
ANION GAP SERPL CALC-SCNC: 3 MMOL/L (ref 3–18)
AST SERPL-CCNC: 31 U/L (ref 10–38)
BASOPHILS # BLD: 0 K/UL (ref 0–0.1)
BASOPHILS NFR BLD: 0 % (ref 0–2)
BILIRUB SERPL-MCNC: 0.5 MG/DL (ref 0.2–1)
BUN SERPL-MCNC: 22 MG/DL (ref 7–18)
BUN/CREAT SERPL: 14 (ref 12–20)
CALCIUM SERPL-MCNC: 9.6 MG/DL (ref 8.5–10.1)
CHLORIDE SERPL-SCNC: 103 MMOL/L (ref 100–111)
CHOLEST SERPL-MCNC: 133 MG/DL
CO2 SERPL-SCNC: 31 MMOL/L (ref 21–32)
CREAT SERPL-MCNC: 1.56 MG/DL (ref 0.6–1.3)
DIFFERENTIAL METHOD BLD: ABNORMAL
EOSINOPHIL # BLD: 0.1 K/UL (ref 0–0.4)
EOSINOPHIL NFR BLD: 1 % (ref 0–5)
ERYTHROCYTE [DISTWIDTH] IN BLOOD BY AUTOMATED COUNT: 12.9 % (ref 11.6–14.5)
FOLATE SERPL-MCNC: >20 NG/ML (ref 3.1–17.5)
GLOBULIN SER CALC-MCNC: 3.7 G/DL (ref 2–4)
GLUCOSE SERPL-MCNC: 86 MG/DL (ref 74–99)
HBA1C MFR BLD: 7.3 %
HCT VFR BLD AUTO: 46.4 % (ref 36–48)
HDLC SERPL-MCNC: 41 MG/DL (ref 40–60)
HDLC SERPL: 3.2 (ref 0–5)
HGB BLD-MCNC: 14.3 G/DL (ref 13–16)
IMM GRANULOCYTES # BLD AUTO: 0 K/UL (ref 0–0.04)
IMM GRANULOCYTES NFR BLD AUTO: 0 % (ref 0–0.5)
LDLC SERPL CALC-MCNC: 70.4 MG/DL (ref 0–100)
LIPID PANEL: NORMAL
LYMPHOCYTES # BLD: 1.6 K/UL (ref 0.9–3.6)
LYMPHOCYTES NFR BLD: 34 % (ref 21–52)
MCH RBC QN AUTO: 27.1 PG (ref 24–34)
MCHC RBC AUTO-ENTMCNC: 30.8 G/DL (ref 31–37)
MCV RBC AUTO: 87.9 FL (ref 78–100)
MONOCYTES # BLD: 0.4 K/UL (ref 0.05–1.2)
MONOCYTES NFR BLD: 10 % (ref 3–10)
NEUTS SEG # BLD: 2.6 K/UL (ref 1.8–8)
NEUTS SEG NFR BLD: 55 % (ref 40–73)
NRBC # BLD: 0 K/UL (ref 0–0.01)
NRBC BLD-RTO: 0 PER 100 WBC
PLATELET # BLD AUTO: 234 K/UL (ref 135–420)
PMV BLD AUTO: 10.3 FL (ref 9.2–11.8)
POTASSIUM SERPL-SCNC: 5.2 MMOL/L (ref 3.5–5.5)
PROT SERPL-MCNC: 7.9 G/DL (ref 6.4–8.2)
PSA SERPL-MCNC: 0.8 NG/ML (ref 0–4)
RBC # BLD AUTO: 5.28 M/UL (ref 4.35–5.65)
SODIUM SERPL-SCNC: 137 MMOL/L (ref 136–145)
TRIGL SERPL-MCNC: 108 MG/DL
VIT B12 SERPL-MCNC: 935 PG/ML (ref 211–911)
VLDLC SERPL CALC-MCNC: 21.6 MG/DL
WBC # BLD AUTO: 4.7 K/UL (ref 4.6–13.2)

## 2023-07-26 PROCEDURE — 99214 OFFICE O/P EST MOD 30 MIN: CPT | Performed by: NURSE PRACTITIONER

## 2023-07-26 PROCEDURE — 82306 VITAMIN D 25 HYDROXY: CPT

## 2023-07-26 PROCEDURE — 85025 COMPLETE CBC W/AUTO DIFF WBC: CPT

## 2023-07-26 PROCEDURE — G0103 PSA SCREENING: HCPCS

## 2023-07-26 PROCEDURE — 80053 COMPREHEN METABOLIC PANEL: CPT

## 2023-07-26 PROCEDURE — 3078F DIAST BP <80 MM HG: CPT | Performed by: NURSE PRACTITIONER

## 2023-07-26 PROCEDURE — 3074F SYST BP LT 130 MM HG: CPT | Performed by: NURSE PRACTITIONER

## 2023-07-26 PROCEDURE — 82746 ASSAY OF FOLIC ACID SERUM: CPT

## 2023-07-26 PROCEDURE — 83036 HEMOGLOBIN GLYCOSYLATED A1C: CPT | Performed by: NURSE PRACTITIONER

## 2023-07-26 PROCEDURE — 82607 VITAMIN B-12: CPT

## 2023-07-26 PROCEDURE — 3046F HEMOGLOBIN A1C LEVEL >9.0%: CPT | Performed by: NURSE PRACTITIONER

## 2023-07-26 PROCEDURE — 80061 LIPID PANEL: CPT

## 2023-07-26 PROCEDURE — 36415 COLL VENOUS BLD VENIPUNCTURE: CPT

## 2023-07-26 RX ORDER — INSULIN GLARGINE 100 [IU]/ML
INJECTION, SOLUTION SUBCUTANEOUS
COMMUNITY
Start: 2023-06-06

## 2023-07-26 RX ORDER — CYCLOBENZAPRINE HCL 10 MG
10 TABLET ORAL 3 TIMES DAILY PRN
Qty: 40 TABLET | Refills: 2 | Status: SHIPPED | OUTPATIENT
Start: 2023-07-26

## 2023-07-26 ASSESSMENT — PATIENT HEALTH QUESTIONNAIRE - PHQ9
SUM OF ALL RESPONSES TO PHQ9 QUESTIONS 1 & 2: 0
2. FEELING DOWN, DEPRESSED OR HOPELESS: 0
SUM OF ALL RESPONSES TO PHQ QUESTIONS 1-9: 0
1. LITTLE INTEREST OR PLEASURE IN DOING THINGS: 0
SUM OF ALL RESPONSES TO PHQ QUESTIONS 1-9: 0

## 2023-07-26 NOTE — PROGRESS NOTES
1. \"Have you been to the ER, urgent care clinic since your last visit? Hospitalized since your last visit? \" No    2. \"Have you seen or consulted any other health care providers outside of the [address] [place] [address] since your last visit? \" No     3. For patients aged 43-73: Has the patient had a colonoscopy / FIT/ Cologuard?  No
of ozempic 1 mg dosage. Return in about 2 months (around 7/16/2023) for DM, in office, with poc A1C. Subjective:   Diabetic Review of Systems - medication compliance: compliant all of the time, diet compliance: compliant all of the time, further ROS: no polyuria or polydipsia, no chest pain, dyspnea or TIA's, no numbness, tingling or pain in extremities states he just began increased ozempic dosage of 1 mg on Sunday. Patient has not been monitoring glucose, doesn't have tomy and per patient dexcom was not covered. Lifestyle/diet: has been trying to adhere to low carbohydrate diet. Patient reports he ready to proceed with referral to bariatric surgeon to discuss surgical weight loss options. Patient Active Problem List   Diagnosis    Obesity, morbid (HCC)     Current Outpatient Medications   Medication Sig Dispense Refill    sildenafil (VIAGRA) 100 MG tablet Take 1 tablet by mouth as needed for Erectile Dysfunction 30 tablet 1    DENTA 5000 PLUS 1.1 % CREA PLEASE SEE ATTACHED FOR DETAILED DIRECTIONS      Continuous Blood Gluc Sensor (FREESTYLE TOMY 3 SENSOR) [facility] Check glucose 4 times daily 2 each 3    Semaglutide, 2 MG/DOSE, (OZEMPIC, 2 MG/DOSE,) 8 MG/3ML SOPN Inject 2 mg into the skin every 7 days Begin after completing 1 mg dosage.  3 mL 3    meloxicam (MOBIC) 15 MG tablet TAKE 1 TABLET BY MOUTH DAILY AFTER MEALS FOR 10 DAYS THEN AS NEEDED      zinc 50 MG CAPS Take by mouth      cyclobenzaprine (FLEXERIL) 10 MG tablet Take 1 tablet by mouth 3 times daily as needed for Muscle spasms (Patient not taking: Reported on 5/16/2023) 40 tablet 2    empagliflozin (JARDIANCE) 25 MG tablet Take 1 tablet by mouth daily 90 tablet 3    ergocalciferol (ERGOCALCIFEROL) 1.25 MG (60021 UT) capsule Take 1 capsule by mouth every 7 days 12 capsule 1    lisinopril-hydroCHLOROthiazide (PRINZIDE;ZESTORETIC) 20-25 MG per tablet Take 1 tablet by mouth daily 90 tablet 3    rosuvastatin (CRESTOR) 20 MG tablet Take 1 tablet by

## 2023-08-22 RX ORDER — SEMAGLUTIDE 2.68 MG/ML
2 INJECTION, SOLUTION SUBCUTANEOUS
Qty: 3 ML | Refills: 3 | OUTPATIENT
Start: 2023-08-22

## 2023-11-02 RX ORDER — SEMAGLUTIDE 0.68 MG/ML
INJECTION, SOLUTION SUBCUTANEOUS
OUTPATIENT
Start: 2023-11-02 | End: 2023-11-30

## 2023-11-07 ENCOUNTER — OFFICE VISIT (OUTPATIENT)
Age: 53
End: 2023-11-07
Payer: COMMERCIAL

## 2023-11-07 VITALS
RESPIRATION RATE: 18 BRPM | BODY MASS INDEX: 38.77 KG/M2 | HEART RATE: 94 BPM | TEMPERATURE: 97.4 F | HEIGHT: 70 IN | SYSTOLIC BLOOD PRESSURE: 102 MMHG | OXYGEN SATURATION: 95 % | DIASTOLIC BLOOD PRESSURE: 70 MMHG | WEIGHT: 270.8 LBS

## 2023-11-07 DIAGNOSIS — M54.2 NECK PAIN ON LEFT SIDE: ICD-10-CM

## 2023-11-07 DIAGNOSIS — M25.512 ACUTE PAIN OF LEFT SHOULDER: Primary | ICD-10-CM

## 2023-11-07 PROCEDURE — 99214 OFFICE O/P EST MOD 30 MIN: CPT | Performed by: NURSE PRACTITIONER

## 2023-11-07 RX ORDER — CYCLOBENZAPRINE HCL 10 MG
10 TABLET ORAL 3 TIMES DAILY PRN
Qty: 40 TABLET | Refills: 0 | Status: SHIPPED | OUTPATIENT
Start: 2023-11-07

## 2023-11-07 RX ORDER — MELOXICAM 15 MG/1
TABLET ORAL
Qty: 30 TABLET | Refills: 0 | Status: SHIPPED | OUTPATIENT
Start: 2023-11-07

## 2023-11-07 RX ORDER — LIDOCAINE 4 G/G
PATCH TOPICAL
Qty: 10 EACH | Refills: 0 | Status: SHIPPED | OUTPATIENT
Start: 2023-11-07

## 2023-11-07 NOTE — PROGRESS NOTES
General Office Visit Note    Assessment/Plan:   orders and follow up as documented in EMR    Maria De Jesus Patricio was seen today for back pain, neck pain and shoulder pain. Diagnoses and all orders for this visit:    Acute pain of left shoulder  -     XR SHOULDER LEFT (MIN 2 VIEWS); Future  -     meloxicam (MOBIC) 15 MG tablet; TAKE 1 TABLET BY MOUTH DAILY AFTER MEALS FOR 10 DAYS THEN AS NEEDED  -     lidocaine 4 % external patch; Apply as needed  -     cyclobenzaprine (FLEXERIL) 10 MG tablet; Take 1 tablet by mouth 3 times daily as needed for Muscle spasms    Neck pain on left side  -     XR CERVICAL SPINE W OBLIQUES FLEXION AND EXTENSION; Future  -     meloxicam (MOBIC) 15 MG tablet; TAKE 1 TABLET BY MOUTH DAILY AFTER MEALS FOR 10 DAYS THEN AS NEEDED  -     lidocaine 4 % external patch; Apply as needed  -     cyclobenzaprine (FLEXERIL) 10 MG tablet; Take 1 tablet by mouth 3 times daily as needed for Muscle spasms       Follow-up and Dispositions    Return if symptoms worsen or fail to improve WITH PCP IN 4-8 WEEKS IF NEEDED. Subjective:     Maria De Jesus Patricio is a 47 y.o. y.o. male who complains of   Chief Complaint   Patient presents with    Back Pain    Neck Pain    Shoulder Pain      Neck Pain  Patient complains of neck pain. Onset of symptoms was 2 weeks ago, that has been intermittent since that time. Current symptoms are pain in left side of his neck to his shoulder (dull ache in character; 8/10 in severity) and paresthesias in thumb, index, and second finger intermittently . Patient reports numbness, tingling, paresthesias in upper extremities. Patient denies weakness, diminished  strength, lack of coordination. Radiation of pain: left shoulder. Event that precipitate these symptoms: none known. Patient has had no prior neck problems. Previous treatments include: ice, rest, medication: analgesic: Tylenol 500 mg x 2 none.      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

## 2023-11-08 NOTE — TELEPHONE ENCOUNTER
Per patient's dispense report patient picked up a 90 day supply. Spoke with patient in regards to refill patient stated pharmacy informed him that they were waiting on our office to authorize refill.

## 2023-11-08 NOTE — TELEPHONE ENCOUNTER
Patient requesting medication refill for     [facility] 100 UNIT/ML injection pen    [id number] [place], [address] [phone] Luz Lenz [phone]   [address] [place] [address], [phone]   Phone:  [phone]  Fax:  [phone]    [facility] also states [facility] not have Ozem[facility] in [facility] adv patient to check with other pharmacies to see if in stock and call office to adv what pharmacy has it in [address] [place] [address].

## 2023-11-10 ENCOUNTER — HOSPITAL ENCOUNTER (OUTPATIENT)
Facility: HOSPITAL | Age: 53
Discharge: HOME | End: 2023-11-10
Payer: COMMERCIAL

## 2023-11-10 DIAGNOSIS — M54.2 NECK PAIN ON LEFT SIDE: ICD-10-CM

## 2023-11-10 DIAGNOSIS — M25.512 ACUTE PAIN OF LEFT SHOULDER: ICD-10-CM

## 2023-11-10 PROCEDURE — 72052 X-RAY EXAM NECK SPINE 6/>VWS: CPT

## 2023-11-10 PROCEDURE — 73030 X-RAY EXAM OF SHOULDER: CPT

## 2023-11-13 RX ORDER — INSULIN GLARGINE 100 [IU]/ML
INJECTION, SOLUTION SUBCUTANEOUS
Qty: 5 ADJUSTABLE DOSE PRE-FILLED PEN SYRINGE | Refills: 5 | Status: SHIPPED | OUTPATIENT
Start: 2023-11-13

## 2023-12-01 NOTE — TELEPHONE ENCOUNTER
Patient requesting to have prescription for Ozempic sent to [facility] on [address], states current pharmacy out of stock

## 2023-12-06 RX ORDER — SEMAGLUTIDE 2.68 MG/ML
2 INJECTION, SOLUTION SUBCUTANEOUS
Qty: 9 ML | Refills: 3 | Status: SHIPPED | OUTPATIENT
Start: 2023-12-06

## 2024-01-12 ENCOUNTER — OFFICE VISIT (OUTPATIENT)
Age: 54
End: 2024-01-12
Payer: COMMERCIAL

## 2024-01-12 ENCOUNTER — HOSPITAL ENCOUNTER (OUTPATIENT)
Facility: HOSPITAL | Age: 54
Setting detail: SPECIMEN
End: 2024-01-12
Payer: COMMERCIAL

## 2024-01-12 VITALS
HEIGHT: 70 IN | HEART RATE: 64 BPM | WEIGHT: 281.8 LBS | DIASTOLIC BLOOD PRESSURE: 68 MMHG | BODY MASS INDEX: 40.34 KG/M2 | OXYGEN SATURATION: 98 % | TEMPERATURE: 98.8 F | SYSTOLIC BLOOD PRESSURE: 111 MMHG | RESPIRATION RATE: 16 BRPM

## 2024-01-12 DIAGNOSIS — E55.9 VITAMIN D DEFICIENCY, UNSPECIFIED: ICD-10-CM

## 2024-01-12 DIAGNOSIS — E78.1 PURE HYPERGLYCERIDEMIA: ICD-10-CM

## 2024-01-12 DIAGNOSIS — E66.01 OBESITY, CLASS III, BMI 40-49.9 (MORBID OBESITY) (HCC): ICD-10-CM

## 2024-01-12 DIAGNOSIS — E11.65 TYPE 2 DIABETES MELLITUS WITH HYPERGLYCEMIA, WITH LONG-TERM CURRENT USE OF INSULIN (HCC): ICD-10-CM

## 2024-01-12 DIAGNOSIS — Z79.4 TYPE 2 DIABETES MELLITUS WITH HYPERGLYCEMIA, WITH LONG-TERM CURRENT USE OF INSULIN (HCC): Primary | ICD-10-CM

## 2024-01-12 DIAGNOSIS — I10 ESSENTIAL (PRIMARY) HYPERTENSION: ICD-10-CM

## 2024-01-12 DIAGNOSIS — E11.65 TYPE 2 DIABETES MELLITUS WITH HYPERGLYCEMIA, WITH LONG-TERM CURRENT USE OF INSULIN (HCC): Primary | ICD-10-CM

## 2024-01-12 DIAGNOSIS — Z79.4 TYPE 2 DIABETES MELLITUS WITH HYPERGLYCEMIA, WITH LONG-TERM CURRENT USE OF INSULIN (HCC): ICD-10-CM

## 2024-01-12 LAB
25(OH)D3 SERPL-MCNC: 42 NG/ML (ref 30–100)
ALBUMIN SERPL-MCNC: 3.6 G/DL (ref 3.4–5)
ALBUMIN/GLOB SERPL: 1 (ref 0.8–1.7)
ALP SERPL-CCNC: 68 U/L (ref 45–117)
ALT SERPL-CCNC: 42 U/L (ref 16–61)
ANION GAP SERPL CALC-SCNC: 2 MMOL/L (ref 3–18)
AST SERPL-CCNC: 21 U/L (ref 10–38)
BILIRUB SERPL-MCNC: 0.6 MG/DL (ref 0.2–1)
BUN SERPL-MCNC: 21 MG/DL (ref 7–18)
BUN/CREAT SERPL: 16 (ref 12–20)
CALCIUM SERPL-MCNC: 9.2 MG/DL (ref 8.5–10.1)
CHLORIDE SERPL-SCNC: 100 MMOL/L (ref 100–111)
CHOLEST SERPL-MCNC: 265 MG/DL
CO2 SERPL-SCNC: 31 MMOL/L (ref 21–32)
CREAT SERPL-MCNC: 1.32 MG/DL (ref 0.6–1.3)
CREAT UR-MCNC: 38 MG/DL (ref 30–125)
EST. AVERAGE GLUCOSE BLD GHB EST-MCNC: 295 MG/DL
GLOBULIN SER CALC-MCNC: 3.5 G/DL (ref 2–4)
GLUCOSE SERPL-MCNC: 368 MG/DL (ref 74–99)
HBA1C MFR BLD: 11.9 % (ref 4.2–5.6)
HDLC SERPL-MCNC: 46 MG/DL (ref 40–60)
HDLC SERPL: 5.8 (ref 0–5)
LDLC SERPL CALC-MCNC: 176.4 MG/DL (ref 0–100)
LIPID PANEL: ABNORMAL
MICROALBUMIN UR-MCNC: 3.82 MG/DL (ref 0–3)
MICROALBUMIN/CREAT UR-RTO: 101 MG/G (ref 0–30)
POTASSIUM SERPL-SCNC: 5 MMOL/L (ref 3.5–5.5)
PROT SERPL-MCNC: 7.1 G/DL (ref 6.4–8.2)
SODIUM SERPL-SCNC: 133 MMOL/L (ref 136–145)
TRIGL SERPL-MCNC: 213 MG/DL
VLDLC SERPL CALC-MCNC: 42.6 MG/DL

## 2024-01-12 PROCEDURE — 82570 ASSAY OF URINE CREATININE: CPT

## 2024-01-12 PROCEDURE — 80053 COMPREHEN METABOLIC PANEL: CPT

## 2024-01-12 PROCEDURE — 3078F DIAST BP <80 MM HG: CPT | Performed by: NURSE PRACTITIONER

## 2024-01-12 PROCEDURE — 82306 VITAMIN D 25 HYDROXY: CPT

## 2024-01-12 PROCEDURE — 99214 OFFICE O/P EST MOD 30 MIN: CPT | Performed by: NURSE PRACTITIONER

## 2024-01-12 PROCEDURE — 80061 LIPID PANEL: CPT

## 2024-01-12 PROCEDURE — 36415 COLL VENOUS BLD VENIPUNCTURE: CPT

## 2024-01-12 PROCEDURE — 83036 HEMOGLOBIN GLYCOSYLATED A1C: CPT

## 2024-01-12 PROCEDURE — 3046F HEMOGLOBIN A1C LEVEL >9.0%: CPT | Performed by: NURSE PRACTITIONER

## 2024-01-12 PROCEDURE — 82043 UR ALBUMIN QUANTITATIVE: CPT

## 2024-01-12 PROCEDURE — 3074F SYST BP LT 130 MM HG: CPT | Performed by: NURSE PRACTITIONER

## 2024-01-12 RX ORDER — ERGOCALCIFEROL 1.25 MG/1
50000 CAPSULE ORAL
Qty: 12 CAPSULE | Refills: 1 | Status: SHIPPED | OUTPATIENT
Start: 2024-01-12

## 2024-01-12 RX ORDER — BENZONATATE 100 MG/1
CAPSULE ORAL
COMMUNITY
Start: 2024-01-02

## 2024-01-12 RX ORDER — ROSUVASTATIN CALCIUM 20 MG/1
20 TABLET, COATED ORAL NIGHTLY
Qty: 100 TABLET | Refills: 3 | Status: SHIPPED | OUTPATIENT
Start: 2024-01-12

## 2024-01-12 ASSESSMENT — PATIENT HEALTH QUESTIONNAIRE - PHQ9
SUM OF ALL RESPONSES TO PHQ QUESTIONS 1-9: 0
1. LITTLE INTEREST OR PLEASURE IN DOING THINGS: 0
SUM OF ALL RESPONSES TO PHQ QUESTIONS 1-9: 0
SUM OF ALL RESPONSES TO PHQ QUESTIONS 1-9: 0
2. FEELING DOWN, DEPRESSED OR HOPELESS: 0
SUM OF ALL RESPONSES TO PHQ QUESTIONS 1-9: 0
SUM OF ALL RESPONSES TO PHQ9 QUESTIONS 1 & 2: 0

## 2024-01-12 NOTE — PROGRESS NOTES
1. \"Have you been to the ER, urgent care clinic since your last visit?  Hospitalized since your last visit?\"  [facility] ER    2. \"Have you seen or consulted any other health care providers outside of the [facility] since your last visit?\" No     3. For patients aged 45-75: Has the patient had a colonoscopy / FIT/ Cologuard? No

## 2024-01-12 NOTE — PROGRESS NOTES
Assessment/Plan:  Kwan was seen today for diabetes, cough and nasal congestion.    Diagnoses and all orders for this visit:    Type 2 diabetes mellitus with hyperglycemia, with long-term current use of insulin ([facility])  -      DIABETES FOOT EXAM  -     Microalbumin / Creatinine Urine Ratio; Future  -     Lipid Panel; Future  -     Comprehensive Metabolic Panel; Future  -     Hemoglobin A1C; Future  -     Vitamin D 25 Hydroxy; Future    Obesity, Class III, BMI 40-49.9 (morbid obesity) ([facility])  -     Microalbumin / Creatinine Urine Ratio; Future  -     Lipid Panel; Future  -     Comprehensive Metabolic Panel; Future  -     Hemoglobin A1C; Future  -     Vitamin D 25 Hydroxy; Future    Essential (primary) hypertension  -     Microalbumin / Creatinine Urine Ratio; Future  -     Lipid Panel; Future  -     Comprehensive Metabolic Panel; Future  -     Hemoglobin A1C; Future  -     Vitamin D 25 Hydroxy; Future    Pure hyperglyceridemia  -     Microalbumin / Creatinine Urine Ratio; Future  -     Lipid Panel; Future  -     Comprehensive Metabolic Panel; Future  -     Hemoglobin A1C; Future  -     Vitamin D 25 Hydroxy; Future    Vitamin D deficiency, unspecified  -     Microalbumin / Creatinine Urine Ratio; Future  -     Lipid Panel; Future  -     Comprehensive Metabolic Panel; Future  -     Hemoglobin A1C; Future  -     Vitamin D 25 Hydroxy; Future    Other orders  -     Semaglutide,0.25 or 0.5MG/DOS, 2 MG/3ML SOPN; Inject 0.25 mg into the skin once a week for 28 days, THEN 0.5 mg once a week for 28 days.  -     empagliflozin (JARDIANCE) 25 MG tablet; Take 1 tablet by mouth daily  -     ergocalciferol (ERGOCALCIFEROL) 1.25 MG (96725 UT) capsule; Take 1 capsule by mouth every 7 days  -     rosuvastatin (CRESTOR) 20 MG tablet; Take 1 tablet by mouth nightly      Reviewed diet, exercise and weight control.  Cardiovascular risk and specific lipid/LDL goals reviewed.  Reviewed medications and side effects in detail..  Addressed ADA

## 2024-02-12 ENCOUNTER — TELEPHONE (OUTPATIENT)
Age: 54
End: 2024-02-12

## 2024-03-25 NOTE — TELEPHONE ENCOUNTER
Pharmacy requesting an alternative medication or submitting a prior authorization for medication:    BASAGLAR KWIKPEN 100 UNIT/ML injection pen [[id number]]

## 2024-03-26 RX ORDER — INSULIN GLARGINE 100 [IU]/ML
INJECTION, SOLUTION SUBCUTANEOUS
Status: CANCELLED | OUTPATIENT
Start: 2024-03-26

## 2024-03-26 NOTE — TELEPHONE ENCOUNTER
Patient's insurance has denied prior authorization for Basaglar Kwikpen. Per patient's insurance's Your request has been denied. Your doctor can send us any new or missing information for us to review. The preferred drug for your plan is: LANTUS. (Requirement: 3 in a class with 3 or more alternatives, 2 in a class with 2 alternatives, or 1 in a class with only 1 alternative.)

## 2024-04-03 RX ORDER — INSULIN GLARGINE 100 [IU]/ML
75 INJECTION, SOLUTION SUBCUTANEOUS NIGHTLY
Qty: 5 ADJUSTABLE DOSE PRE-FILLED PEN SYRINGE | Refills: 3 | Status: SHIPPED | OUTPATIENT
Start: 2024-04-03

## 2024-04-12 PROBLEM — E11.65 TYPE 2 DIABETES MELLITUS WITH HYPERGLYCEMIA, WITH LONG-TERM CURRENT USE OF INSULIN (HCC): Status: ACTIVE | Noted: 2024-04-12

## 2024-04-12 PROBLEM — E78.1 PURE HYPERGLYCERIDEMIA: Status: ACTIVE | Noted: 2024-04-12

## 2024-04-12 PROBLEM — Z79.4 TYPE 2 DIABETES MELLITUS WITH HYPERGLYCEMIA, WITH LONG-TERM CURRENT USE OF INSULIN (HCC): Status: ACTIVE | Noted: 2024-04-12

## 2024-04-12 PROBLEM — E55.9 HYPOVITAMINOSIS D: Status: ACTIVE | Noted: 2024-04-12

## 2024-04-12 PROBLEM — I10 ESSENTIAL (PRIMARY) HYPERTENSION: Status: ACTIVE | Noted: 2024-04-12

## 2024-05-03 RX ORDER — SEMAGLUTIDE 0.68 MG/ML
INJECTION, SOLUTION SUBCUTANEOUS
Refills: 1 | OUTPATIENT
Start: 2024-05-03

## 2024-06-17 DIAGNOSIS — E11.65 TYPE 2 DIABETES MELLITUS WITH HYPERGLYCEMIA, WITH LONG-TERM CURRENT USE OF INSULIN (HCC): ICD-10-CM

## 2024-06-17 DIAGNOSIS — Z79.4 TYPE 2 DIABETES MELLITUS WITH HYPERGLYCEMIA, WITH LONG-TERM CURRENT USE OF INSULIN (HCC): ICD-10-CM

## 2024-06-18 NOTE — TELEPHONE ENCOUNTER
Last Appointment- 4/12/24    Next Appointment- 7/26/24    Previous Refill Encounter(s): Date: 5/16/23 #2 Refills 3    Requested Prescriptions     Pending Prescriptions Disp Refills    Continuous Glucose Sensor (FREESTYLE OTMY 3 SENSOR) MISC 2 each 3     Sig: Check glucose 4 times daily

## 2024-06-27 RX ORDER — BLOOD-GLUCOSE SENSOR
EACH MISCELLANEOUS
Qty: 2 EACH | Refills: 3 | Status: SHIPPED | OUTPATIENT
Start: 2024-06-27

## 2024-08-22 NOTE — TELEPHONE ENCOUNTER
Last Appointment- 4/12/24    Next Appointment- 9/26/24    Previous Refill Encounter(s) Lisinopril-HCTZ: Date: 4/18/23 #90 Refills 3  Previous Refill Encounter(s) Viagra: Date: 7/14/23 #30 Refills 1  Previous Refill Encounter(s) Ozempic: Date: 4/12/24 3m.refills    Requested Prescriptions     Pending Prescriptions Disp Refills    lisinopril-hydroCHLOROthiazide (PRINZIDE;ZESTORETIC) 20-25 MG per tablet 90 tablet 3     Sig: Take 1 tablet by mouth daily    sildenafil (VIAGRA) 100 MG tablet 30 tablet 1     Sig: Take 1 tablet by mouth as needed for Erectile Dysfunction    Semaglutide, 1 MG/DOSE, (OZEMPIC, 1 MG/DOSE,) 4 MG/3ML SOPN sc injection 3 mL 2     Sig: Inject 1 mg into the skin once a week

## 2024-08-28 RX ORDER — SEMAGLUTIDE 0.68 MG/ML
INJECTION, SOLUTION SUBCUTANEOUS
Refills: 1 | OUTPATIENT
Start: 2024-08-28

## 2024-08-28 RX ORDER — SEMAGLUTIDE 2.68 MG/ML
2 INJECTION, SOLUTION SUBCUTANEOUS
Qty: 9 ML | Refills: 1 | Status: SHIPPED | OUTPATIENT
Start: 2024-08-28

## 2024-08-28 RX ORDER — SILDENAFIL 100 MG/1
100 TABLET, FILM COATED ORAL PRN
Qty: 30 TABLET | Refills: 1 | Status: SHIPPED | OUTPATIENT
Start: 2024-08-28 | End: 2024-08-28

## 2024-08-28 RX ORDER — VARDENAFIL HYDROCHLORIDE 20 MG/1
20 TABLET ORAL PRN
Qty: 30 TABLET | Refills: 1 | Status: SHIPPED | OUTPATIENT
Start: 2024-08-28

## 2024-08-28 RX ORDER — ERGOCALCIFEROL 1.25 MG/1
50000 CAPSULE, LIQUID FILLED ORAL WEEKLY
Qty: 12 CAPSULE | Refills: 0 | Status: SHIPPED | OUTPATIENT
Start: 2024-08-28

## 2024-08-28 RX ORDER — SEMAGLUTIDE 1.34 MG/ML
1 INJECTION, SOLUTION SUBCUTANEOUS WEEKLY
Qty: 3 ML | Refills: 2 | OUTPATIENT
Start: 2024-08-28

## 2024-08-28 RX ORDER — SEMAGLUTIDE 1.34 MG/ML
INJECTION, SOLUTION SUBCUTANEOUS
Refills: 2 | OUTPATIENT
Start: 2024-08-28

## 2024-08-28 RX ORDER — LISINOPRIL AND HYDROCHLOROTHIAZIDE 20; 25 MG/1; MG/1
1 TABLET ORAL DAILY
Qty: 90 TABLET | Refills: 3 | Status: SHIPPED | OUTPATIENT
Start: 2024-08-28

## 2024-08-29 RX ORDER — SEMAGLUTIDE 0.68 MG/ML
INJECTION, SOLUTION SUBCUTANEOUS
Refills: 1 | OUTPATIENT
Start: 2024-08-29

## 2024-09-26 ENCOUNTER — OFFICE VISIT (OUTPATIENT)
Facility: CLINIC | Age: 54
End: 2024-09-26

## 2024-09-26 ENCOUNTER — HOSPITAL ENCOUNTER (OUTPATIENT)
Facility: HOSPITAL | Age: 54
Setting detail: SPECIMEN
Discharge: HOME | End: 2024-09-29
Payer: COMMERCIAL

## 2024-09-26 VITALS
RESPIRATION RATE: 18 BRPM | BODY MASS INDEX: 40.03 KG/M2 | SYSTOLIC BLOOD PRESSURE: 129 MMHG | HEIGHT: 70 IN | HEART RATE: 85 BPM | OXYGEN SATURATION: 99 % | TEMPERATURE: 97.9 F | DIASTOLIC BLOOD PRESSURE: 76 MMHG | WEIGHT: 279.6 LBS

## 2024-09-26 DIAGNOSIS — Z79.4 TYPE 2 DIABETES MELLITUS WITH HYPERGLYCEMIA, WITH LONG-TERM CURRENT USE OF INSULIN (HCC): Primary | ICD-10-CM

## 2024-09-26 DIAGNOSIS — I10 ESSENTIAL (PRIMARY) HYPERTENSION: ICD-10-CM

## 2024-09-26 DIAGNOSIS — E55.9 HYPOVITAMINOSIS D: ICD-10-CM

## 2024-09-26 DIAGNOSIS — E11.65 TYPE 2 DIABETES MELLITUS WITH HYPERGLYCEMIA, WITH LONG-TERM CURRENT USE OF INSULIN (HCC): ICD-10-CM

## 2024-09-26 DIAGNOSIS — Z23 NEED FOR INFLUENZA VACCINATION: ICD-10-CM

## 2024-09-26 DIAGNOSIS — E78.00 PURE HYPERCHOLESTEROLEMIA: ICD-10-CM

## 2024-09-26 DIAGNOSIS — E11.65 TYPE 2 DIABETES MELLITUS WITH HYPERGLYCEMIA, WITH LONG-TERM CURRENT USE OF INSULIN (HCC): Primary | ICD-10-CM

## 2024-09-26 DIAGNOSIS — Z79.4 TYPE 2 DIABETES MELLITUS WITH HYPERGLYCEMIA, WITH LONG-TERM CURRENT USE OF INSULIN (HCC): ICD-10-CM

## 2024-09-26 DIAGNOSIS — E66.01 OBESITY, CLASS III, BMI 40-49.9 (MORBID OBESITY): ICD-10-CM

## 2024-09-26 LAB
25(OH)D3 SERPL-MCNC: 65.3 NG/ML (ref 30–100)
ALBUMIN SERPL-MCNC: 4.3 G/DL (ref 3.4–5)
ALBUMIN/GLOB SERPL: 1.2 (ref 0.8–1.7)
ALP SERPL-CCNC: 66 U/L (ref 45–117)
ALT SERPL-CCNC: 51 U/L (ref 16–61)
ANION GAP SERPL CALC-SCNC: 4 MMOL/L (ref 3–18)
AST SERPL-CCNC: 34 U/L (ref 10–38)
BILIRUB SERPL-MCNC: 0.6 MG/DL (ref 0.2–1)
BUN SERPL-MCNC: 26 MG/DL (ref 7–18)
BUN/CREAT SERPL: 17 (ref 12–20)
CALCIUM SERPL-MCNC: 9.2 MG/DL (ref 8.5–10.1)
CHLORIDE SERPL-SCNC: 102 MMOL/L (ref 100–111)
CHOLEST SERPL-MCNC: 152 MG/DL
CO2 SERPL-SCNC: 27 MMOL/L (ref 21–32)
CREAT SERPL-MCNC: 1.56 MG/DL (ref 0.6–1.3)
CREAT UR-MCNC: 128 MG/DL (ref 30–125)
EST. AVERAGE GLUCOSE BLD GHB EST-MCNC: 186 MG/DL
GLOBULIN SER CALC-MCNC: 3.6 G/DL (ref 2–4)
GLUCOSE SERPL-MCNC: 124 MG/DL (ref 74–99)
HBA1C MFR BLD: 8.1 % (ref 4.2–5.6)
HDLC SERPL-MCNC: 38 MG/DL (ref 40–60)
HDLC SERPL: 4 (ref 0–5)
LDLC SERPL CALC-MCNC: 84.8 MG/DL (ref 0–100)
LIPID PANEL: ABNORMAL
MICROALBUMIN UR-MCNC: 11.5 MG/DL (ref 0–3)
MICROALBUMIN/CREAT UR-RTO: 90 MG/G (ref 0–30)
POTASSIUM SERPL-SCNC: 5.1 MMOL/L (ref 3.5–5.5)
PROT SERPL-MCNC: 7.9 G/DL (ref 6.4–8.2)
SODIUM SERPL-SCNC: 133 MMOL/L (ref 136–145)
TRIGL SERPL-MCNC: 146 MG/DL
VLDLC SERPL CALC-MCNC: 29.2 MG/DL

## 2024-09-26 PROCEDURE — 83036 HEMOGLOBIN GLYCOSYLATED A1C: CPT

## 2024-09-26 PROCEDURE — 82570 ASSAY OF URINE CREATININE: CPT

## 2024-09-26 PROCEDURE — 82043 UR ALBUMIN QUANTITATIVE: CPT

## 2024-09-26 PROCEDURE — 36415 COLL VENOUS BLD VENIPUNCTURE: CPT

## 2024-09-26 PROCEDURE — 80053 COMPREHEN METABOLIC PANEL: CPT

## 2024-09-26 PROCEDURE — 82306 VITAMIN D 25 HYDROXY: CPT

## 2024-09-26 PROCEDURE — 80061 LIPID PANEL: CPT

## 2024-09-26 RX ORDER — INSULIN GLARGINE 100 [IU]/ML
75 INJECTION, SOLUTION SUBCUTANEOUS NIGHTLY
Qty: 5 ADJUSTABLE DOSE PRE-FILLED PEN SYRINGE | Refills: 3 | Status: CANCELLED | OUTPATIENT
Start: 2024-09-26

## 2024-09-26 NOTE — PROGRESS NOTES
\"Have you been to the ER, urgent care clinic since your last visit?  Hospitalized since your last visit?\"    NO    “Have you seen or consulted any other health care providers outside our system since your last visit?”    NO        Click Here for Release of Records Request

## 2024-09-26 NOTE — PROGRESS NOTES
Assessment/Plan:  Assessment & Plan  1. Cough and Cold.  He has been experiencing a cough and runny nose for about a week, with symptoms improving. There is no fever or chills. Zyrtec was recommended to alleviate his runny nose. He was advised to stay hydrated to help thin out mucus secretions.    2. Diabetes Mellitus.  His blood sugars have been fluctuating significantly, with readings ranging from under 70 to 300. He has been self-adjusting his Lantus dosage, which was discouraged, currently taking Lantus 70 units since the beginning of the year instead of prescribed 75 units. A switch from Ozempic to Mounjaro was suggested to achieve better blood sugar control. The importance of maintaining hydration and dietary modifications, such as limiting high-sugar fruits and incorporating more berries, was emphasized. A referral to Dr. Gil for a consultation on bariatric surgery was made, and the potential benefits of the surgery were discussed. A consultation with an obesity counselor was strongly recommended in addition to consult with Dr. Gil. Lab work was ordered to monitor his condition.    Kwan was seen today for diabetes.    Diagnoses and all orders for this visit:    Type 2 diabetes mellitus with hyperglycemia, with long-term current use of insulin (HCC)  -     Microalbumin / Creatinine Urine Ratio; Future  -     Lipid Panel; Future  -     Comprehensive Metabolic Panel; Future  -     Hemoglobin A1C; Future  -     CARLOS -  Kevin Gil MD, Bariatric Surgery, [place] ([facility])  -     Tirzepatide (MOUNJARO) 10 MG/0.5ML SOPN SC injection; Inject 0.5 mLs into the skin once a week    Hypovitaminosis D  -     Vitamin D 25 Hydroxy; Future    Pure hypercholesterolemia  -     Kevin Pugh MD, Bariatric Surgery, [place] ([facility])    Essential (primary) hypertension  -     Kevin Pugh MD, Bariatric Surgery, [place] ([facility])    Obesity, Class III, BMI 40-49.9 (morbid

## 2024-11-22 RX ORDER — ERGOCALCIFEROL 1.25 MG/1
50000 CAPSULE, LIQUID FILLED ORAL WEEKLY
Qty: 12 CAPSULE | Refills: 0 | Status: SHIPPED | OUTPATIENT
Start: 2024-11-22

## 2024-12-02 ENCOUNTER — TELEMEDICINE (OUTPATIENT)
Facility: CLINIC | Age: 54
End: 2024-12-02
Payer: COMMERCIAL

## 2024-12-02 DIAGNOSIS — E78.00 PURE HYPERCHOLESTEROLEMIA: ICD-10-CM

## 2024-12-02 DIAGNOSIS — Z79.4 TYPE 2 DIABETES MELLITUS WITH HYPERGLYCEMIA, WITH LONG-TERM CURRENT USE OF INSULIN (HCC): Primary | ICD-10-CM

## 2024-12-02 DIAGNOSIS — E11.65 TYPE 2 DIABETES MELLITUS WITH HYPERGLYCEMIA, WITH LONG-TERM CURRENT USE OF INSULIN (HCC): Primary | ICD-10-CM

## 2024-12-02 DIAGNOSIS — I10 ESSENTIAL (PRIMARY) HYPERTENSION: ICD-10-CM

## 2024-12-02 PROCEDURE — 3052F HG A1C>EQUAL 8.0%<EQUAL 9.0%: CPT | Performed by: NURSE PRACTITIONER

## 2024-12-02 PROCEDURE — 99214 OFFICE O/P EST MOD 30 MIN: CPT | Performed by: NURSE PRACTITIONER

## 2024-12-02 NOTE — PROGRESS NOTES
Kwan Luomercedes Kohli, was evaluated through a synchronous (real-time) audio-video encounter. The patient (or guardian if applicable) is aware that this is a billable service, which includes applicable co-pays. This Virtual Visit was conducted with patient's (and/or legal guardian's) consent. Patient identification was verified, and a caregiver was present when appropriate.   The patient was located at Home: [address] [place] [phone]  Provider was located at Home (Appt Dept State): [place]  Confirm you are appropriately licensed, registered, or certified to deliver care in the state where the patient is located as indicated above. If you are not or unsure, please re-schedule the visit: Yes, I confirm.     Kwan Fierro Jr. (:  1970) is a Established patient, presenting virtually for evaluation of the following:      Below is the assessment and plan developed based on review of pertinent history, physical exam, labs, studies, and medications.         --Lola Prince

## 2024-12-02 NOTE — PROGRESS NOTES
Tried calling patient at 10:22 to begin virtual appointment with KATELYN Pringle but patient did not answer. Left patient a voice message and will call back again shortly

## 2024-12-02 NOTE — PROGRESS NOTES
Kwan Fierro Jr. is a 54 y.o. (1970) male is a Established patient, presents alone, who was seen by synchronous (real-time) audio-video technology on 12/2/2024 for evaluation of the following:   Chief Complaint   Patient presents with    Diabetes   History obtained from patient.    Assessment & Plan:     Assessment & Plan  1. Diabetes Mellitus.  His blood glucose levels are not within the desired range. He has been instructed to adhere strictly to the prescribed insulin dosage of 75 units. A prescription for pen needles will be provided as he is running low. An attempt will be made to secure insurance coverage for insulin vials and syringes. If the insurance does not cover the insulin vials, he should not  the syringes. A three-month supply of Mounjaro will be ordered. He should ensure that both the insulin and the syringes are covered by his insurance before picking them up. If there are no refills available for Ozempic, he should inform the office so that a new prescription can be sent.     2. Memory Issues.  He reports experiencing memory loss and difficulty focusing. A dedicated appointment will be scheduled in four weeks to address these concerns.    3. Hip Pain.  He reports right hip pain, especially after walking at work and while sleeping on it. He is advised to consider stretching exercises to alleviate the discomfort. Further evaluation may be needed if the pain persists.    4. Mental Health.  He is experiencing poor mental health and plans to contact Dr. Desir at the start of the new year to set up an appointment. He is encouraged to follow through with the recommendations from the dietitian and doctors.    Follow-up  Return in 3 months for follow-up with labs to be conducted one week prior to the appointment.  Type 2 diabetes mellitus with hyperglycemia, with long-term current use of insulin (HCC)  Pure hypercholesterolemia  Essential (primary) hypertension  There are other unrelated

## 2024-12-04 RX ORDER — INSULIN GLARGINE 300 U/ML
INJECTION, SOLUTION SUBCUTANEOUS NIGHTLY
Status: CANCELLED | OUTPATIENT
Start: 2024-12-04

## 2024-12-10 RX ORDER — ERGOCALCIFEROL 1.25 MG/1
50000 CAPSULE, LIQUID FILLED ORAL WEEKLY
Qty: 12 CAPSULE | Refills: 0 | OUTPATIENT
Start: 2024-12-10

## 2024-12-10 RX ORDER — EMPAGLIFLOZIN 25 MG/1
25 TABLET, FILM COATED ORAL DAILY
Qty: 90 TABLET | Refills: 4 | Status: SHIPPED | OUTPATIENT
Start: 2024-12-10

## 2024-12-18 RX ORDER — LISINOPRIL AND HYDROCHLOROTHIAZIDE 20; 25 MG/1; MG/1
1 TABLET ORAL DAILY
Qty: 90 TABLET | Refills: 3 | Status: CANCELLED | OUTPATIENT
Start: 2024-12-18

## 2024-12-18 NOTE — TELEPHONE ENCOUNTER
Last Appointment- 12/2/24    Next Appointment- 1/2/25    Previous Refill Encounter(s): Date: 8/28/24 #90 Refills 3    Requested Prescriptions     Pending Prescriptions Disp Refills    lisinopril-hydroCHLOROthiazide (PRINZIDE;ZESTORETIC) 20-25 MG per tablet 90 tablet 3     Sig: Take 1 tablet by mouth daily

## 2024-12-23 RX ORDER — SEMAGLUTIDE 2.68 MG/ML
2 INJECTION, SOLUTION SUBCUTANEOUS
Qty: 3 ML | Refills: 3 | Status: SHIPPED | OUTPATIENT
Start: 2024-12-23

## 2024-12-27 RX ORDER — PEN NEEDLE, DIABETIC 30 GX5/16"
1 NEEDLE, DISPOSABLE MISCELLANEOUS DAILY
Qty: 100 EACH | Refills: 3 | Status: SHIPPED | OUTPATIENT
Start: 2024-12-27

## 2025-01-02 ENCOUNTER — OFFICE VISIT (OUTPATIENT)
Facility: CLINIC | Age: 55
End: 2025-01-02
Payer: COMMERCIAL

## 2025-01-02 VITALS
DIASTOLIC BLOOD PRESSURE: 60 MMHG | HEIGHT: 70 IN | HEART RATE: 85 BPM | TEMPERATURE: 98.2 F | SYSTOLIC BLOOD PRESSURE: 115 MMHG | RESPIRATION RATE: 16 BRPM | OXYGEN SATURATION: 97 % | WEIGHT: 281.8 LBS | BODY MASS INDEX: 40.34 KG/M2

## 2025-01-02 DIAGNOSIS — Z87.820 PERSONAL HISTORY OF TRAUMATIC BRAIN INJURY: ICD-10-CM

## 2025-01-02 DIAGNOSIS — R68.89 FORGETFULNESS: Primary | ICD-10-CM

## 2025-01-02 DIAGNOSIS — R51.9 FRONTAL HEADACHE: ICD-10-CM

## 2025-01-02 DIAGNOSIS — Z86.79 HISTORY OF INTRACRANIAL HEMORRHAGE: ICD-10-CM

## 2025-01-02 PROCEDURE — 99214 OFFICE O/P EST MOD 30 MIN: CPT | Performed by: NURSE PRACTITIONER

## 2025-01-02 PROCEDURE — 3074F SYST BP LT 130 MM HG: CPT | Performed by: NURSE PRACTITIONER

## 2025-01-02 PROCEDURE — 3078F DIAST BP <80 MM HG: CPT | Performed by: NURSE PRACTITIONER

## 2025-01-02 ASSESSMENT — PATIENT HEALTH QUESTIONNAIRE - PHQ9
SUM OF ALL RESPONSES TO PHQ QUESTIONS 1-9: 1
SUM OF ALL RESPONSES TO PHQ9 QUESTIONS 1 & 2: 1
1. LITTLE INTEREST OR PLEASURE IN DOING THINGS: SEVERAL DAYS
SUM OF ALL RESPONSES TO PHQ QUESTIONS 1-9: 1
2. FEELING DOWN, DEPRESSED OR HOPELESS: NOT AT ALL

## 2025-01-02 NOTE — PROGRESS NOTES
Kwan Fierro Jr. (:  1970) is a 54 y.o. male, Established patient, presents alone, here for evaluation of the following chief complaint(s):  Memory Loss, Hip Pain (Patient reports right hip pain for about 1 month.), and Knee Pain (Patient reports right knee pain for about 1 month.)  History obtained from patient.       Assessment & Plan  1. Memory changes.  The patient has been experiencing memory issues since a work-related accident on 2021, during which he sustained a head injury from a fall caused by tripping over a cord. He was transferred to [facility]. Despite reporting a blackout to his employer, no formal diagnosis of concussion was made. He will be referred to a neurologist. A referral to a neuropsychologist will be initiated for further evaluation of the memory changes. Additionally, a referral to a neurologist will be made to assess any potential neurological causes.    2. Headaches.  He experiences persistent frontal head pain, described as throbbing, accompanied by sensitivity to light and noise. He manages the pain by closing his eyes and resting, which sometimes alleviates the discomfort. He continues to work despite these symptoms.    3. Diabetes mellitus.  He is scheduled for a routine diabetes appointment next month.    Results    Kwan was seen today for memory loss, hip pain and knee pain.    Diagnoses and all orders for this visit:    Forgetfulness  -     External Referral To Neuropsychology    History of intracranial hemorrhage  -     [facility] - David Mccormack MD, Neurology, [place] ([facility])    Personal history of traumatic brain injury  -     [facility] - David Mccormack MD, Neurology, [place] ([facility])    Frontal headache  -     [facility] - David Mccormack MD, Neurology, [place] ([facility])      orders and follow up as documented in [facility], reviewed compliance with lifestyle measures, reviewed diet, exercise and weight

## 2025-01-13 NOTE — TELEPHONE ENCOUNTER
Last Appointment- 1/2/225    Next Appointment- 3/5/25    Previous Refill Encounter(s): Date: 4/3/24 #5 Refills 3     Requested Prescriptions     Pending Prescriptions Disp Refills    insulin glargine (LANTUS SOLOSTAR) 100 UNIT/ML injection pen 5 Adjustable Dose Pre-filled Pen Syringe 3     Sig: Inject 75 Units into the skin nightly

## 2025-01-21 RX ORDER — INSULIN GLARGINE 100 [IU]/ML
75 INJECTION, SOLUTION SUBCUTANEOUS NIGHTLY
Qty: 5 ADJUSTABLE DOSE PRE-FILLED PEN SYRINGE | Refills: 3 | Status: SHIPPED | OUTPATIENT
Start: 2025-01-21

## 2025-01-21 RX ORDER — ERGOCALCIFEROL 1.25 MG/1
50000 CAPSULE, LIQUID FILLED ORAL WEEKLY
Qty: 12 CAPSULE | Refills: 0 | Status: SHIPPED | OUTPATIENT
Start: 2025-01-21

## 2025-02-26 DIAGNOSIS — I10 ESSENTIAL (PRIMARY) HYPERTENSION: ICD-10-CM

## 2025-02-26 DIAGNOSIS — Z79.4 TYPE 2 DIABETES MELLITUS WITH HYPERGLYCEMIA, WITH LONG-TERM CURRENT USE OF INSULIN (HCC): Primary | ICD-10-CM

## 2025-02-26 DIAGNOSIS — E78.00 PURE HYPERCHOLESTEROLEMIA: ICD-10-CM

## 2025-02-26 DIAGNOSIS — E11.65 TYPE 2 DIABETES MELLITUS WITH HYPERGLYCEMIA, WITH LONG-TERM CURRENT USE OF INSULIN (HCC): Primary | ICD-10-CM

## 2025-02-26 DIAGNOSIS — E55.9 HYPOVITAMINOSIS D: ICD-10-CM

## 2025-02-27 ENCOUNTER — HOSPITAL ENCOUNTER (OUTPATIENT)
Facility: HOSPITAL | Age: 55
Setting detail: SPECIMEN
Discharge: HOME | End: 2025-02-27
Payer: COMMERCIAL

## 2025-02-27 DIAGNOSIS — E55.9 HYPOVITAMINOSIS D: ICD-10-CM

## 2025-02-27 DIAGNOSIS — Z79.4 TYPE 2 DIABETES MELLITUS WITH HYPERGLYCEMIA, WITH LONG-TERM CURRENT USE OF INSULIN (HCC): ICD-10-CM

## 2025-02-27 DIAGNOSIS — E78.00 PURE HYPERCHOLESTEROLEMIA: ICD-10-CM

## 2025-02-27 DIAGNOSIS — E11.65 TYPE 2 DIABETES MELLITUS WITH HYPERGLYCEMIA, WITH LONG-TERM CURRENT USE OF INSULIN (HCC): ICD-10-CM

## 2025-02-27 DIAGNOSIS — I10 ESSENTIAL (PRIMARY) HYPERTENSION: ICD-10-CM

## 2025-02-27 LAB
25(OH)D3 SERPL-MCNC: 75.9 NG/ML (ref 30–100)
ALBUMIN SERPL-MCNC: 4.3 G/DL (ref 3.4–5)
ALBUMIN/GLOB SERPL: 1.3 (ref 0.8–1.7)
ALP SERPL-CCNC: 58 U/L (ref 45–117)
ALT SERPL-CCNC: 38 U/L (ref 16–61)
ANION GAP SERPL CALC-SCNC: 4 MMOL/L (ref 3–18)
AST SERPL-CCNC: 28 U/L (ref 10–38)
BILIRUB SERPL-MCNC: 0.8 MG/DL (ref 0.2–1)
BUN SERPL-MCNC: 28 MG/DL (ref 7–18)
BUN/CREAT SERPL: 16 (ref 12–20)
CALCIUM SERPL-MCNC: 9.2 MG/DL (ref 8.5–10.1)
CHLORIDE SERPL-SCNC: 102 MMOL/L (ref 100–111)
CHOLEST SERPL-MCNC: 169 MG/DL
CO2 SERPL-SCNC: 29 MMOL/L (ref 21–32)
CREAT SERPL-MCNC: 1.71 MG/DL (ref 0.6–1.3)
CREAT UR-MCNC: 129 MG/DL (ref 30–125)
EST. AVERAGE GLUCOSE BLD GHB EST-MCNC: 174 MG/DL
GLOBULIN SER CALC-MCNC: 3.4 G/DL (ref 2–4)
GLUCOSE SERPL-MCNC: 116 MG/DL (ref 74–99)
HBA1C MFR BLD: 7.7 % (ref 4.2–5.6)
HDLC SERPL-MCNC: 43 MG/DL (ref 40–60)
HDLC SERPL: 3.9 (ref 0–5)
LDLC SERPL CALC-MCNC: 100.6 MG/DL (ref 0–100)
LIPID PANEL: ABNORMAL
MICROALBUMIN UR-MCNC: 8.58 MG/DL (ref 0–3)
MICROALBUMIN/CREAT UR-RTO: 67 MG/G (ref 0–30)
POTASSIUM SERPL-SCNC: 4.3 MMOL/L (ref 3.5–5.5)
PROT SERPL-MCNC: 7.7 G/DL (ref 6.4–8.2)
SODIUM SERPL-SCNC: 135 MMOL/L (ref 136–145)
TRIGL SERPL-MCNC: 127 MG/DL
VLDLC SERPL CALC-MCNC: 25.4 MG/DL

## 2025-02-27 PROCEDURE — 82306 VITAMIN D 25 HYDROXY: CPT

## 2025-02-27 PROCEDURE — 80053 COMPREHEN METABOLIC PANEL: CPT

## 2025-02-27 PROCEDURE — 36415 COLL VENOUS BLD VENIPUNCTURE: CPT

## 2025-02-27 PROCEDURE — 82570 ASSAY OF URINE CREATININE: CPT

## 2025-02-27 PROCEDURE — 80061 LIPID PANEL: CPT

## 2025-02-27 PROCEDURE — 83036 HEMOGLOBIN GLYCOSYLATED A1C: CPT

## 2025-02-27 PROCEDURE — 82043 UR ALBUMIN QUANTITATIVE: CPT

## 2025-03-05 ENCOUNTER — OFFICE VISIT (OUTPATIENT)
Facility: CLINIC | Age: 55
End: 2025-03-05
Payer: COMMERCIAL

## 2025-03-05 VITALS
TEMPERATURE: 98.8 F | HEART RATE: 99 BPM | RESPIRATION RATE: 20 BRPM | OXYGEN SATURATION: 99 % | HEIGHT: 70 IN | SYSTOLIC BLOOD PRESSURE: 115 MMHG | DIASTOLIC BLOOD PRESSURE: 65 MMHG | BODY MASS INDEX: 40.2 KG/M2 | WEIGHT: 280.8 LBS

## 2025-03-05 DIAGNOSIS — R80.9 MICROALBUMINURIA: ICD-10-CM

## 2025-03-05 DIAGNOSIS — N18.31 STAGE 3A CHRONIC KIDNEY DISEASE (HCC): ICD-10-CM

## 2025-03-05 DIAGNOSIS — I10 ESSENTIAL (PRIMARY) HYPERTENSION: ICD-10-CM

## 2025-03-05 DIAGNOSIS — E78.00 PURE HYPERCHOLESTEROLEMIA: ICD-10-CM

## 2025-03-05 DIAGNOSIS — E55.9 HYPOVITAMINOSIS D: ICD-10-CM

## 2025-03-05 DIAGNOSIS — N18.31 TYPE 2 DIABETES MELLITUS WITH STAGE 3A CHRONIC KIDNEY DISEASE, WITH LONG-TERM CURRENT USE OF INSULIN (HCC): Primary | ICD-10-CM

## 2025-03-05 DIAGNOSIS — Z79.4 TYPE 2 DIABETES MELLITUS WITH STAGE 3A CHRONIC KIDNEY DISEASE, WITH LONG-TERM CURRENT USE OF INSULIN (HCC): Primary | ICD-10-CM

## 2025-03-05 DIAGNOSIS — E11.22 TYPE 2 DIABETES MELLITUS WITH STAGE 3A CHRONIC KIDNEY DISEASE, WITH LONG-TERM CURRENT USE OF INSULIN (HCC): Primary | ICD-10-CM

## 2025-03-05 DIAGNOSIS — M25.551 RIGHT HIP PAIN: ICD-10-CM

## 2025-03-05 PROCEDURE — 3078F DIAST BP <80 MM HG: CPT | Performed by: NURSE PRACTITIONER

## 2025-03-05 PROCEDURE — 3051F HG A1C>EQUAL 7.0%<8.0%: CPT | Performed by: NURSE PRACTITIONER

## 2025-03-05 PROCEDURE — 99214 OFFICE O/P EST MOD 30 MIN: CPT | Performed by: NURSE PRACTITIONER

## 2025-03-05 PROCEDURE — 3074F SYST BP LT 130 MM HG: CPT | Performed by: NURSE PRACTITIONER

## 2025-03-05 RX ORDER — CETIRIZINE HYDROCHLORIDE 10 MG/1
10 TABLET ORAL DAILY
Qty: 90 TABLET | Refills: 2 | Status: SHIPPED | OUTPATIENT
Start: 2025-03-05

## 2025-03-05 RX ORDER — CHLORHEXIDINE GLUCONATE ORAL RINSE 1.2 MG/ML
SOLUTION DENTAL
COMMUNITY
Start: 2025-01-31 | End: 2025-03-05

## 2025-03-05 RX ORDER — AMMONIUM LACTATE 12 G/100G
LOTION TOPICAL
Qty: 400 G | Refills: 1 | Status: SHIPPED | OUTPATIENT
Start: 2025-03-05

## 2025-03-05 SDOH — ECONOMIC STABILITY: FOOD INSECURITY: WITHIN THE PAST 12 MONTHS, YOU WORRIED THAT YOUR FOOD WOULD RUN OUT BEFORE YOU GOT MONEY TO BUY MORE.: NEVER TRUE

## 2025-03-05 SDOH — ECONOMIC STABILITY: FOOD INSECURITY: WITHIN THE PAST 12 MONTHS, THE FOOD YOU BOUGHT JUST DIDN'T LAST AND YOU DIDN'T HAVE MONEY TO GET MORE.: NEVER TRUE

## 2025-03-05 NOTE — PROGRESS NOTES
Assessment/Plan:  Assessment & Plan  1. Pruritus.  A prescription for Lac-Hydrin lotion has been provided, with instructions to apply it as needed after shaking the bottle.    2. Right hip pain.  An x-ray of the right hip has been ordered. He has been advised to perform stretches and use a massage gun or TENS unit for relief.    3. Allergic rhinitis.  A prescription for Zyrtec 10 mg has been provided. If insurance does not cover it, he can purchase the generic version over the counter.    4. Diabetes mellitus.  His A1c is 7.7, indicating good control. He has been advised to continue his current medication regimen, including Ozempic and Jardiance. He has been instructed to monitor his blood sugar levels regularly and maintain a balanced diet with low sugar intake.    5. Hypertension.  His blood pressure is well-controlled. He has been advised to continue his current medication regimen, including lisinopril. He should also maintain adequate hydration.    6. Hyperlipidemia.  His cholesterol levels are within the normal range. He has been advised to continue his current medication regimen and maintain a balanced diet.    7. Chronic kidney disease.  His kidney function is stable, but he has a history of microalbuminuria. He has been advised to continue his current medication regimen, including Jardiance, which helps protect his kidneys. Regular monitoring of kidney function will continue.    8. Forgetfulness.  He has been advised to follow up with neurology for his forgetfulness and headaches. A referral to neurology has been provided.    Type 2 diabetes mellitus with stage 3a chronic kidney disease, with long-term current use of insulin (HCC)  Essential (primary) hypertension  Hypovitaminosis D  Pure hypercholesterolemia  Right hip pain  -     XR HIP RIGHT (2-3 VIEWS); Future  Stage 3a chronic kidney disease (HCC)  Microalbuminuria    Results  Laboratory Studies  Blood sugar is 116. A1c is 7.7. Triglycerides are 127.

## 2025-03-05 NOTE — PATIENT INSTRUCTIONS
[facility] Laboratory Locations    Sites open Monday to Friday. ? indicates the location is open Saturdays.   Call your preferred location for test preparation, business hours and other information you need.    ? [facility]   [address] [place]  [address]    [phone]  Monday - Friday,  7:00 a.m. - 5:00 p.m.   Saturday,   No appointment needed  [address] [id number] [phone]  Monday-Friday, 6:30 a.m. - 6:00 p.m.  No appointment needed     ? [facility]  [address] [place]  [address]  Monday - Friday, 7:00 a.m. - 6:00 p.m.  Saturday, 9:00 a.m. - noon    [facility]  [address], [place] [address]  [phone]  Monday-Friday, 7:00 a.m. - 6:00  p.m.  No appointment needed    ? [facility]    [address], [address]  [place] [address] [phone]  Monday - Friday, 7:00 a.m. - 3:30 p.m.    Saturday, 7:00 a.m. to noon  No appointment needed

## 2025-03-12 ENCOUNTER — HOSPITAL ENCOUNTER (OUTPATIENT)
Facility: HOSPITAL | Age: 55
Discharge: HOME | End: 2025-03-15
Payer: COMMERCIAL

## 2025-03-12 DIAGNOSIS — M25.551 RIGHT HIP PAIN: ICD-10-CM

## 2025-03-12 PROCEDURE — 73502 X-RAY EXAM HIP UNI 2-3 VIEWS: CPT

## 2025-03-19 RX ORDER — ERGOCALCIFEROL 1.25 MG/1
50000 CAPSULE, LIQUID FILLED ORAL WEEKLY
Qty: 12 CAPSULE | Refills: 0 | OUTPATIENT
Start: 2025-03-19

## 2025-03-23 ENCOUNTER — RESULTS FOLLOW-UP (OUTPATIENT)
Facility: HOSPITAL | Age: 55
End: 2025-03-23

## 2025-03-24 PROBLEM — R80.9 MICROALBUMINURIA: Status: ACTIVE | Noted: 2025-03-24

## 2025-03-24 PROBLEM — N18.31 STAGE 3A CHRONIC KIDNEY DISEASE (HCC): Status: ACTIVE | Noted: 2025-03-24

## 2025-04-23 RX ORDER — LISINOPRIL AND HYDROCHLOROTHIAZIDE 20; 25 MG/1; MG/1
1 TABLET ORAL DAILY
Qty: 90 TABLET | Refills: 3 | Status: CANCELLED | OUTPATIENT
Start: 2025-04-23

## 2025-04-25 NOTE — TELEPHONE ENCOUNTER
Last Appointment- 3/5/25    Next Appointment- 6/27/25    Previous Refill Encounter(s): Date: 1/21/25 #12 Refills 0    Requested Prescriptions     Pending Prescriptions Disp Refills    vitamin D (ERGOCALCIFEROL) 1.25 MG (25936 UT) CAPS capsule 12 capsule 0     Sig: Take 1 capsule by mouth Once a week at 5 PM

## 2025-04-25 NOTE — TELEPHONE ENCOUNTER
Last Appointment- 3/5/25    Next Appointment- 6/27/25    Previous Refill Encounter(s): Date: 1/12/24 #100 Refills 3    Requested Prescriptions     Pending Prescriptions Disp Refills    rosuvastatin (CRESTOR) 20 MG tablet 100 tablet 3     Sig: Take 1 tablet by mouth nightly

## 2025-04-25 NOTE — TELEPHONE ENCOUNTER
Last Appointment- 3/5/25    Next Appointment- 6/27/25    Previous Refill Encounter(s): Date: 12/10/24 #90 Refills 4    Requested Prescriptions     Pending Prescriptions Disp Refills    empagliflozin (JARDIANCE) 25 MG tablet 90 tablet 4     Sig: Take 1 tablet by mouth daily

## 2025-05-01 RX ORDER — ERGOCALCIFEROL 1.25 MG/1
50000 CAPSULE, LIQUID FILLED ORAL WEEKLY
Qty: 12 CAPSULE | Refills: 0 | OUTPATIENT
Start: 2025-05-01

## 2025-05-01 RX ORDER — ROSUVASTATIN CALCIUM 20 MG/1
20 TABLET, COATED ORAL NIGHTLY
Qty: 100 TABLET | Refills: 3 | Status: SHIPPED | OUTPATIENT
Start: 2025-05-01

## 2025-05-13 RX ORDER — ERGOCALCIFEROL 1.25 MG/1
50000 CAPSULE, LIQUID FILLED ORAL WEEKLY
Qty: 6 CAPSULE | Refills: 0 | Status: SHIPPED | OUTPATIENT
Start: 2025-05-13

## 2025-06-25 RX ORDER — ERGOCALCIFEROL 1.25 MG/1
50000 CAPSULE, LIQUID FILLED ORAL WEEKLY
Qty: 12 CAPSULE | Refills: 0 | Status: SHIPPED | OUTPATIENT
Start: 2025-06-25

## 2025-06-25 NOTE — TELEPHONE ENCOUNTER
Last Appointment- 3/5/25    Next Appointment- 7/11/25    Previous Refill Encounter(s): Date: 1/21/25 #5 Refills 3    Requested Prescriptions     Pending Prescriptions Disp Refills    insulin glargine (LANTUS SOLOSTAR) 100 UNIT/ML injection pen 5 Adjustable Dose Pre-filled Pen Syringe 3     Sig: Inject 75 Units into the skin nightly

## 2025-06-26 RX ORDER — INSULIN GLARGINE 100 [IU]/ML
75 INJECTION, SOLUTION SUBCUTANEOUS NIGHTLY
Qty: 30 ML | Refills: 2 | Status: SHIPPED | OUTPATIENT
Start: 2025-06-26

## 2025-06-27 ENCOUNTER — HOSPITAL ENCOUNTER (OUTPATIENT)
Facility: HOSPITAL | Age: 55
Setting detail: SPECIMEN
Discharge: HOME | End: 2025-06-30
Payer: COMMERCIAL

## 2025-06-27 DIAGNOSIS — E55.9 HYPOVITAMINOSIS D: ICD-10-CM

## 2025-06-27 DIAGNOSIS — Z79.4 TYPE 2 DIABETES MELLITUS WITH HYPERGLYCEMIA, WITH LONG-TERM CURRENT USE OF INSULIN (HCC): ICD-10-CM

## 2025-06-27 DIAGNOSIS — E11.65 TYPE 2 DIABETES MELLITUS WITH HYPERGLYCEMIA, WITH LONG-TERM CURRENT USE OF INSULIN (HCC): ICD-10-CM

## 2025-06-27 DIAGNOSIS — Z79.4 TYPE 2 DIABETES MELLITUS WITH HYPERGLYCEMIA, WITH LONG-TERM CURRENT USE OF INSULIN (HCC): Primary | ICD-10-CM

## 2025-06-27 DIAGNOSIS — E11.65 TYPE 2 DIABETES MELLITUS WITH HYPERGLYCEMIA, WITH LONG-TERM CURRENT USE OF INSULIN (HCC): Primary | ICD-10-CM

## 2025-06-27 LAB
25(OH)D3 SERPL-MCNC: 50.7 NG/ML (ref 30–100)
ALBUMIN SERPL-MCNC: 4.2 G/DL (ref 3.4–5)
ALBUMIN/GLOB SERPL: 1.3 (ref 0.8–1.7)
ALP SERPL-CCNC: 59 U/L (ref 45–117)
ALT SERPL-CCNC: 33 U/L (ref 10–50)
ANION GAP SERPL CALC-SCNC: 12 MMOL/L (ref 3–18)
AST SERPL-CCNC: 31 U/L (ref 10–38)
BILIRUB SERPL-MCNC: 0.5 MG/DL (ref 0.2–1)
BUN SERPL-MCNC: 32 MG/DL (ref 6–23)
BUN/CREAT SERPL: 18 (ref 12–20)
CALCIUM SERPL-MCNC: 10.1 MG/DL (ref 8.5–10.1)
CHLORIDE SERPL-SCNC: 99 MMOL/L (ref 98–107)
CHOLEST SERPL-MCNC: 178 MG/DL
CO2 SERPL-SCNC: 27 MMOL/L (ref 21–32)
CREAT SERPL-MCNC: 1.74 MG/DL (ref 0.6–1.3)
CREAT UR-MCNC: 100 MG/DL (ref 30–125)
EST. AVERAGE GLUCOSE BLD GHB EST-MCNC: 157 MG/DL
GLOBULIN SER CALC-MCNC: 3.2 G/DL (ref 2–4)
GLUCOSE SERPL-MCNC: 165 MG/DL (ref 74–108)
HBA1C MFR BLD: 7.1 % (ref 4.2–5.6)
HDLC SERPL-MCNC: 35 MG/DL (ref 40–60)
HDLC SERPL: 5.1 (ref 0–5)
LDLC SERPL CALC-MCNC: 118 MG/DL (ref 0–100)
MICROALBUMIN UR-MCNC: 5.04 MG/DL (ref 0–3)
MICROALBUMIN/CREAT UR-RTO: 50 MG/G (ref 0–30)
POTASSIUM SERPL-SCNC: 4.7 MMOL/L (ref 3.5–5.5)
PROT SERPL-MCNC: 7.4 G/DL (ref 6.4–8.2)
SODIUM SERPL-SCNC: 138 MMOL/L (ref 136–145)
TRIGL SERPL-MCNC: 123 MG/DL (ref 0–150)
VLDLC SERPL CALC-MCNC: 25 MG/DL

## 2025-06-27 PROCEDURE — 80053 COMPREHEN METABOLIC PANEL: CPT

## 2025-06-27 PROCEDURE — 82306 VITAMIN D 25 HYDROXY: CPT

## 2025-06-27 PROCEDURE — 82570 ASSAY OF URINE CREATININE: CPT

## 2025-06-27 PROCEDURE — 36415 COLL VENOUS BLD VENIPUNCTURE: CPT

## 2025-06-27 PROCEDURE — 80061 LIPID PANEL: CPT

## 2025-06-27 PROCEDURE — 82043 UR ALBUMIN QUANTITATIVE: CPT

## 2025-06-27 PROCEDURE — 83036 HEMOGLOBIN GLYCOSYLATED A1C: CPT

## 2025-07-08 RX ORDER — SEMAGLUTIDE 2.68 MG/ML
2 INJECTION, SOLUTION SUBCUTANEOUS
Qty: 9 ML | Refills: 1 | Status: SHIPPED | OUTPATIENT
Start: 2025-07-08

## (undated) DEVICE — KIT CLN UP BON SECOURS MARYV

## (undated) DEVICE — BANDAGE COBAN 4 IN COMPR W4INXL5YD FOAM COHESIVE QUIK STK SELF ADH SFT

## (undated) DEVICE — PACK PROCEDURE SURG MAJ W/ BASIN LF

## (undated) DEVICE — GAUZE,SPONGE,4"X4",16PLY,STRL,LF,10/TRAY: Brand: MEDLINE

## (undated) DEVICE — GLOVE ORTHO 8   MSG9480

## (undated) DEVICE — BANDAGE,ELASTIC,ESMARK,STERILE,4"X9',LF: Brand: MEDLINE

## (undated) DEVICE — DRAPE,EXTREMITY,89X128,STERILE: Brand: MEDLINE

## (undated) DEVICE — SUTURE VCRL SZ 2-0 L27IN ABSRB UD L26MM SH 1/2 CIR J417H

## (undated) DEVICE — INTENDED FOR TISSUE SEPARATION, AND OTHER PROCEDURES THAT REQUIRE A SHARP SURGICAL BLADE TO PUNCTURE OR CUT.: Brand: BARD-PARKER ® CARBON RIB-BACK BLADES

## (undated) DEVICE — STOCKINETTE,IMPERVIOUS,12X48,STERILE: Brand: MEDLINE

## (undated) DEVICE — BLADE RMFG OSC COARSE 25X9MM -- LAWSON OEM ITEM 225903

## (undated) DEVICE — SUTURE MCRYL SZ 4-0 L27IN ABSRB UD L24MM PS-1 3/8 CIR PRIM Y935H

## (undated) DEVICE — CURITY NON-ADHERENT STRIPS: Brand: CURITY

## (undated) DEVICE — GARMENT,MEDLINE,DVT,INT,CALF,MED, GEN2: Brand: MEDLINE

## (undated) DEVICE — APPLICATOR MEDICATED 10.5 CC SOLUTION CLR STRL CHLORAPREP

## (undated) DEVICE — BLANKET WRM AD W50XL85.8IN PACU FULL BODY FORC AIR

## (undated) DEVICE — DRAPE,REIN 53X77,STERILE: Brand: MEDLINE

## (undated) DEVICE — SOLUTION IV 1000ML 0.9% SOD CHL

## (undated) DEVICE — INTENDED FOR TISSUE SEPARATION, AND OTHER PROCEDURES THAT REQUIRE A SHARP SURGICAL BLADE TO PUNCTURE OR CUT.: Brand: BARD-PARKER ® STAINLESS STEEL BLADES

## (undated) DEVICE — BANDAGE,GAUZE,BULKEE LITE,3"X4.1YD,STRL: Brand: MEDLINE